# Patient Record
Sex: MALE | Race: WHITE | NOT HISPANIC OR LATINO | Employment: OTHER | URBAN - METROPOLITAN AREA
[De-identification: names, ages, dates, MRNs, and addresses within clinical notes are randomized per-mention and may not be internally consistent; named-entity substitution may affect disease eponyms.]

---

## 2024-04-26 ENCOUNTER — HOSPITAL ENCOUNTER (EMERGENCY)
Facility: HOSPITAL | Age: 39
Discharge: HOME | End: 2024-04-26
Attending: EMERGENCY MEDICINE
Payer: MEDICAID

## 2024-04-26 VITALS
DIASTOLIC BLOOD PRESSURE: 74 MMHG | HEIGHT: 70 IN | TEMPERATURE: 98 F | OXYGEN SATURATION: 97 % | RESPIRATION RATE: 16 BRPM | BODY MASS INDEX: 23.62 KG/M2 | WEIGHT: 165 LBS | SYSTOLIC BLOOD PRESSURE: 117 MMHG | HEART RATE: 86 BPM
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
## 2024-04-26 PROCEDURE — 96365 THER/PROPH/DIAG IV INF INIT: CPT

## 2024-04-26 PROCEDURE — 99284 EMERGENCY DEPT VISIT MOD MDM: CPT | Mod: 25

## 2024-04-26 PROCEDURE — 63600175 PHARM REV CODE 636 W HCPCS: Mod: JZ,TB | Performed by: EMERGENCY MEDICINE

## 2024-04-26 RX ORDER — CLINDAMYCIN HYDROCHLORIDE 300 MG/1
300 CAPSULE ORAL EVERY 6 HOURS
Qty: 28 CAPSULE | Refills: 0 | Status: SHIPPED | OUTPATIENT
Start: 2024-04-26 | End: 2024-05-03

## 2024-04-26 RX ORDER — CLINDAMYCIN PHOSPHATE 600 MG/50ML
600 INJECTION, SOLUTION INTRAVENOUS
Status: COMPLETED | OUTPATIENT
Start: 2024-04-26 | End: 2024-04-26

## 2024-04-26 RX ORDER — MUPIROCIN 20 MG/G
OINTMENT TOPICAL 3 TIMES DAILY
Qty: 22 G | Refills: 0 | Status: SHIPPED | OUTPATIENT
Start: 2024-04-26

## 2024-04-26 RX ADMIN — CLINDAMYCIN PHOSPHATE 600 MG: 600 INJECTION, SOLUTION INTRAVENOUS at 09:04

## 2024-04-26 NOTE — ED PROVIDER NOTES
Encounter Date: 4/26/2024       History     Chief Complaint   Patient presents with    Insect Bite     Pt presents with area of redness and swelling above right eyebrow; onset 3 days ago, unsure of what bit him; pt is a type 1 DM,  currently; also reports hx of MRSA; requesting IV antibiotic therapy as he has had this in the past; denies any fevers      This 38-year-old man presents with an insect bite and early abscess on his right forehead near the temple.  Has a history of MRSA and states that clindamycin is what he tolerates and what works best for him.  He has also a type 1 diabetic.  He is here on a job and does not have Bactroban ointment with him.  He requests that I give him a 1st dose of clindamycin IV.       Review of patient's allergies indicates:   Allergen Reactions    Meropenem Anaphylaxis     Cardiac arrest     Pcn [penicillins] Hives    Topamax [topiramate] Hives     No past medical history on file.  No past surgical history on file.  No family history on file.     Review of Systems   Constitutional:  Negative for fever.   HENT:  Negative for sore throat.    Respiratory:  Negative for shortness of breath.    Cardiovascular:  Negative for chest pain.   Gastrointestinal:  Negative for nausea.   Genitourinary:  Negative for dysuria.   Musculoskeletal:  Negative for back pain.   Skin:  Negative for rash.   Neurological:  Negative for weakness.   Hematological:  Does not bruise/bleed easily.       Physical Exam     Initial Vitals [04/26/24 0950]   BP Pulse Resp Temp SpO2   117/74 86 16 98.1 °F (36.7 °C) 97 %      MAP       --         Physical Exam    Nursing note and vitals reviewed.  Constitutional: He appears well-developed and well-nourished.   HENT:   Head: Normocephalic and atraumatic.   Mouth/Throat: Mucous membranes are normal.   Eyes: EOM are normal. Pupils are equal, round, and reactive to light.   Neck: Neck supple.   Normal range of motion.  Cardiovascular:  Normal rate, regular rhythm,  normal heart sounds and intact distal pulses.           Pulmonary/Chest: Breath sounds normal.   Abdominal: Abdomen is soft. Bowel sounds are normal.   Musculoskeletal:         General: Normal range of motion.      Cervical back: Normal range of motion and neck supple.     Neurological: He is alert and oriented to person, place, and time. He has normal strength.   Skin: Skin is warm and dry. Capillary refill takes less than 2 seconds. Abscess (right temple) noted.   Psychiatric: He has a normal mood and affect. His behavior is normal. Judgment and thought content normal.         ED Course   Procedures  Labs Reviewed - No data to display       Imaging Results    None          Medications   clindamycin in D5W 600 mg/50 mL IVPB 600 mg (0 mg Intravenous Stopped 4/26/24 1026)     Medical Decision Making  Blood sugar per his dexcom is 164.     Risk  Prescription drug management.                                      Clinical Impression:  Final diagnoses:  [W57.XXXA] Bug bite with infection, initial encounter (Primary)          ED Disposition Condition    Discharge Stable          ED Prescriptions       Medication Sig Dispense Start Date End Date Auth. Provider    clindamycin (CLEOCIN) 300 MG capsule Take 1 capsule (300 mg total) by mouth every 6 (six) hours. for 7 days 28 capsule 4/26/2024 5/3/2024 Gerry Boles MD    mupirocin (BACTROBAN) 2 % ointment Apply topically 3 (three) times daily. 22 g 4/26/2024 -- Gerry Boles MD          Follow-up Information       Follow up With Specialties Details Why Contact Info    Follow up with your MD as needed.                 Gerry Boles MD  04/26/24 1022

## 2024-04-28 ENCOUNTER — HOSPITAL ENCOUNTER (EMERGENCY)
Facility: HOSPITAL | Age: 39
Discharge: HOME | End: 2024-04-28
Attending: FAMILY MEDICINE
Payer: MEDICAID

## 2024-04-28 VITALS
RESPIRATION RATE: 18 BRPM | DIASTOLIC BLOOD PRESSURE: 85 MMHG | SYSTOLIC BLOOD PRESSURE: 137 MMHG | BODY MASS INDEX: 23.62 KG/M2 | HEIGHT: 70 IN | TEMPERATURE: 98 F | WEIGHT: 165 LBS | HEART RATE: 69 BPM | OXYGEN SATURATION: 98 %

## 2024-04-28 DIAGNOSIS — L03.213 PRESEPTAL CELLULITIS OF RIGHT UPPER EYELID: Primary | ICD-10-CM

## 2024-04-28 PROCEDURE — 96374 THER/PROPH/DIAG INJ IV PUSH: CPT

## 2024-04-28 PROCEDURE — 63600175 PHARM REV CODE 636 W HCPCS: Mod: JZ,TB | Performed by: FAMILY MEDICINE

## 2024-04-28 PROCEDURE — 99284 EMERGENCY DEPT VISIT MOD MDM: CPT | Mod: 25

## 2024-04-28 RX ORDER — IBUPROFEN 800 MG/1
800 TABLET ORAL
Status: DISCONTINUED | OUTPATIENT
Start: 2024-04-28 | End: 2024-04-28

## 2024-04-28 RX ORDER — ACETAMINOPHEN 500 MG
1000 TABLET ORAL
Status: DISCONTINUED | OUTPATIENT
Start: 2024-04-28 | End: 2024-04-28

## 2024-04-28 RX ORDER — CLINDAMYCIN PHOSPHATE 900 MG/50ML
900 INJECTION, SOLUTION INTRAVENOUS
Status: COMPLETED | OUTPATIENT
Start: 2024-04-28 | End: 2024-04-28

## 2024-04-28 RX ORDER — KETOROLAC TROMETHAMINE 30 MG/ML
30 INJECTION, SOLUTION INTRAMUSCULAR; INTRAVENOUS
Status: DISCONTINUED | OUTPATIENT
Start: 2024-04-28 | End: 2024-04-28 | Stop reason: HOSPADM

## 2024-04-28 RX ADMIN — CLINDAMYCIN IN 5 PERCENT DEXTROSE 900 MG: 18 INJECTION, SOLUTION INTRAVENOUS at 10:04

## 2024-04-28 NOTE — ED NOTES
Pt presents with worsening swelling to right eye; was seen here 2 days ago with an abscess above right eye, given IV Clindamycin and d/c home with Clinda 300mg TID; pt has been taking antibiotics as prescribed but swelling now to periorbital area; denies any visual changes; pt reports fever last night; has hx of multiple abscess requiring IV antibiotics; also DM1; pt has dex-com and blood sugars have stayed above 200 over the last 24hrs, pt has been dosing more; pt reports he has taken tylenol and ibuprofen this morning

## 2024-04-28 NOTE — ED NOTES
MD notified that pt took tylenol and ibuprofen pta, requesting something more for pain ; MD notified

## 2024-04-28 NOTE — ED PROVIDER NOTES
Encounter Date: 4/28/2024       History     Chief Complaint   Patient presents with    Abscess     Pt seen in ER 4/26 for a possible insect bite to R side of forehead -worse today with swelling to R eye      38-year-old presents has a no preseptal cellulitis in the right side was seen here 2 days ago started on clindamycin has only had 2 days with the medicine returns today feels like it has not getting any better he is still open open his eye he has no fevers no chills still some preseptal cellulitis appears where the insect bite he has no palpable abscess I explained to the patient I feel he just needs to give him oral meds more time warm compresses give it a couple of more days at this point with no fevers no chills mild case of some preseptal cellulitis I feel continuing to p.o. antibiotics would be appropriate for a couple of more days patient states he would like to be admitted explained to him at this point I do not feel he meets criteria says he will just go back to his home in [place] see someone over there I will go and give him 1 dose of IV clindamycin per his request prior to discharge recommend Tylenol Motrin for the pain and hot compresses        Review of patient's allergies indicates:   Allergen Reactions    Meropenem Anaphylaxis     Cardiac arrest     Pcn [penicillins] Hives    Topamax [topiramate] Hives     No past medical history on file.  No past surgical history on file.  No family history on file.     Review of Systems   Constitutional:  Negative for fever.   HENT:  Positive for facial swelling. Negative for sore throat.    Respiratory:  Negative for shortness of breath.    Cardiovascular:  Negative for chest pain.   Gastrointestinal:  Negative for nausea.   Genitourinary:  Negative for dysuria.   Musculoskeletal:  Negative for back pain.   Skin:  Negative for rash.   Neurological:  Negative for weakness.   Hematological:  Does not bruise/bleed easily.   All other systems reviewed and are  negative.      Physical Exam     Initial Vitals [04/28/24 0958]   BP Pulse Resp Temp SpO2   122/73 84 18 97.8 °F (36.6 °C) 98 %      MAP       --         Physical Exam    Nursing note and vitals reviewed.  Constitutional: He appears well-developed and well-nourished. He is active.   HENT:   Head: Normocephalic and atraumatic.   Eyes: Conjunctivae, EOM and lids are normal. Pupils are equal, round, and reactive to light.   Preseptal cellulitis right upper eyelid   Neck: Trachea normal and phonation normal. Neck supple. No thyroid mass present.   Normal range of motion.  Cardiovascular:  Normal rate, regular rhythm, normal heart sounds and normal pulses.           Pulmonary/Chest: Breath sounds normal.   Abdominal: Abdomen is soft.   Musculoskeletal:         General: Normal range of motion.      Cervical back: Normal range of motion and neck supple.     Neurological: He is alert and oriented to person, place, and time. He has normal strength and normal reflexes.   Skin: Skin is warm and intact. There is erythema.   Psychiatric: He has a normal mood and affect. His speech is normal and behavior is normal. Judgment and thought content normal. Cognition and memory are normal.         ED Course   Procedures  Labs Reviewed - No data to display       Imaging Results    None          Medications   clindamycin in D5W 900 mg/50 mL IVPB 900 mg (900 mg Intravenous New Bag 4/28/24 1021)   ketorolac injection 30 mg (30 mg Intravenous Not Given 4/28/24 1015)     Medical Decision Making  38-year-old presents has a no preseptal cellulitis in the right side was seen here 2 days ago started on clindamycin has only had 2 days with the medicine returns today feels like it has not getting any better he is still open open his eye he has no fevers no chills still some preseptal cellulitis appears where the insect bite he has no palpable abscess I explained to the patient I feel he just needs to give him oral meds more time warm compresses  give it a couple of more days at this point with no fevers no chills mild case of some preseptal cellulitis I feel continuing to p.o. antibiotics would be appropriate for a couple of more days patient states he would like to be admitted explained to him at this point I do not feel he meets criteria says he will just go back to his home in [place] see someone over there I will go and give him 1 dose of IV clindamycin per his request prior to discharge recommend Tylenol Motrin for the pain and hot compresses          Risk  Prescription drug management.                                      Clinical Impression:  Final diagnoses:  [L03.213] Preseptal cellulitis of right upper eyelid (Primary)          ED Disposition Condition    Discharge Stable          ED Prescriptions    None       Follow-up Information       Follow up With Specialties Details Why Contact Info    [facility] - Emergency Dept Emergency Medicine  As needed [address] [facility] [address]  [phone]             Kilo Tran MD  04/28/24 5440

## 2024-04-30 ENCOUNTER — HOSPITAL ENCOUNTER (EMERGENCY)
Facility: HOSPITAL | Age: 39
Discharge: LEFT BEFORE DISCHARGE | End: 2024-04-30
Attending: FAMILY MEDICINE
Payer: MEDICAID

## 2024-04-30 VITALS
SYSTOLIC BLOOD PRESSURE: 131 MMHG | BODY MASS INDEX: 25.11 KG/M2 | RESPIRATION RATE: 20 BRPM | DIASTOLIC BLOOD PRESSURE: 76 MMHG | OXYGEN SATURATION: 98 % | TEMPERATURE: 98 F | HEART RATE: 91 BPM | WEIGHT: 175 LBS

## 2024-04-30 DIAGNOSIS — L03.211 FACIAL CELLULITIS: Primary | ICD-10-CM

## 2024-04-30 DIAGNOSIS — L02.01 FACIAL ABSCESS: ICD-10-CM

## 2024-04-30 PROCEDURE — 99281 EMR DPT VST MAYX REQ PHY/QHP: CPT

## 2024-04-30 RX ORDER — LIDOCAINE 40 MG/G
CREAM TOPICAL
Status: DISCONTINUED | OUTPATIENT
Start: 2024-04-30 | End: 2024-04-30 | Stop reason: HOSPADM

## 2024-04-30 RX ORDER — CLINDAMYCIN PHOSPHATE 900 MG/50ML
900 INJECTION, SOLUTION INTRAVENOUS
Status: DISCONTINUED | OUTPATIENT
Start: 2024-04-30 | End: 2024-04-30 | Stop reason: HOSPADM

## 2024-04-30 NOTE — ED PROVIDER NOTES
Encounter Date: 4/30/2024       History     Chief Complaint   Patient presents with    Facial Swelling     Reports was bit by insect on rt side of forehead a few days ago - seen here/treated - on antibiotics- not improving- swelling is worse- alisson orbital edema to rt eye- no drainage per pt     38-year-old presents with some swelling around the right eye area was seen a couple of days ago with her preseptal cellulitis on oral antibiotics has gotten worse instead of better not responding to the oral antibiotics I explained to the patient most likely will have to be admitted for fell of outpatient oral antibiotics we will get some lab work blood cultures put some numbing medicines trying to do drainage get some cultures when everything is back we will consult internal medicine for admission for IV antibiotics patient appears to be very angry and upset        Review of patient's allergies indicates:   Allergen Reactions    Meropenem Anaphylaxis     Cardiac arrest     Pcn [penicillins] Hives    Topamax [topiramate] Hives     No past medical history on file.  No past surgical history on file.  No family history on file.     Review of Systems   HENT:  Positive for facial swelling.    All other systems reviewed and are negative.      Physical Exam     Initial Vitals [04/30/24 1201]   BP Pulse Resp Temp SpO2   131/76 91 20 98.3 °F (36.8 °C) 98 %      MAP       --         Physical Exam    Nursing note and vitals reviewed.  Constitutional: He appears well-developed and well-nourished. He is active.   HENT:   Head: Normocephalic and atraumatic.   Eyes: Conjunctivae, EOM and lids are normal. Pupils are equal, round, and reactive to light.   Neck: Trachea normal and phonation normal. Neck supple. No thyroid mass present.   Normal range of motion.  Cardiovascular:  Normal rate, regular rhythm, normal heart sounds, intact distal pulses and normal pulses.           Pulmonary/Chest: Breath sounds normal.   Abdominal: Abdomen is  soft. Bowel sounds are normal.   Musculoskeletal:         General: Normal range of motion.      Cervical back: Normal range of motion and neck supple.     Neurological: He is alert and oriented to person, place, and time. He has normal strength and normal reflexes.   Skin: Skin is warm and intact. There is erythema.   Swelling of the right upper and lower lid appears to be a small abscess above the right eye versus cellulitis   Psychiatric: He has a normal mood and affect. His speech is normal and behavior is normal. Judgment and thought content normal. Cognition and memory are normal.         ED Course   Procedures  Labs Reviewed   BLOOD CULTURE [facility]   BLOOD CULTURE [facility]   BASIC METABOLIC PANEL   CBC W/ AUTO DIFFERENTIAL    Narrative:     The following orders were created for panel order CBC Auto Differential.  Procedure                               Abnormality         Status                     ---------                               -----------         ------                     CBC with Differential[[id number]]                                                        Please view results for these tests on the individual orders.   HIGH SENSITIVITY CRP   CBC WITH DIFFERENTIAL          Imaging Results    None          Medications   clindamycin in D5W 900 mg/50 mL IVPB 900 mg (has no administration in time range)   vancomycin (VANCOCIN) 1,000 mg in dextrose 5 % (D5W) 250 mL IVPB (Vial-Mate) (has no administration in time range)   LIDOcaine 4 % cream (has no administration in time range)     Medical Decision Making  38-year-old presents with some swelling around the right eye area was seen a couple of days ago with her preseptal cellulitis on oral antibiotics has gotten worse instead of better not responding to the oral antibiotics I explained to the patient most likely will have to be admitted for fell of outpatient oral antibiotics we will get some lab work blood cultures put some numbing medicines trying to do  drainage get some cultures when everything is back we will consult internal medicine for admission for IV antibiotics patient appears to be very angry and upset      Patient asked for some for pain told him we would start with some Tylenol and if that did not work give him some meds patient became upset because he wanted to give him Tylenol use some curse words and then eloped said he will find another hospital    Amount and/or Complexity of Data Reviewed  Labs: ordered.    Risk  OTC drugs.  Prescription drug management.  Risk Details: Differential diagnosis preseptal cellulitis orbital cellulitis facial cellulitis facial abscess                                      Clinical Impression:  Final diagnoses:  [L03.211] Facial cellulitis (Primary)  [L02.01] Facial abscess          ED Disposition Condition    Eloped Stable                Kilo Tran MD  04/30/24 3871

## 2024-04-30 NOTE — ED NOTES
Entered pt room, phlebotomist reported pt told her he was going to another hospital and walked out of the room.

## 2024-05-06 DIAGNOSIS — L02.01 FACIAL ABSCESS: Primary | ICD-10-CM

## 2024-07-01 ENCOUNTER — CLINICAL SUPPORT (OUTPATIENT)
Dept: FAMILY MEDICINE | Facility: CLINIC | Age: 39
End: 2024-07-01
Attending: NURSE PRACTITIONER
Payer: MEDICAID

## 2024-07-01 ENCOUNTER — OFFICE VISIT (OUTPATIENT)
Dept: FAMILY MEDICINE | Facility: CLINIC | Age: 39
End: 2024-07-01
Payer: MEDICAID

## 2024-07-01 ENCOUNTER — TELEPHONE (OUTPATIENT)
Dept: FAMILY MEDICINE | Facility: CLINIC | Age: 39
End: 2024-07-01

## 2024-07-01 VITALS
TEMPERATURE: 98 F | HEART RATE: 63 BPM | RESPIRATION RATE: 18 BRPM | DIASTOLIC BLOOD PRESSURE: 71 MMHG | OXYGEN SATURATION: 99 % | HEIGHT: 70 IN | WEIGHT: 163 LBS | SYSTOLIC BLOOD PRESSURE: 105 MMHG | BODY MASS INDEX: 23.34 KG/M2

## 2024-07-01 DIAGNOSIS — L98.9 LESION OF SKIN OF SCALP: ICD-10-CM

## 2024-07-01 DIAGNOSIS — Z00.00 ENCOUNTER FOR WELLNESS EXAMINATION: ICD-10-CM

## 2024-07-01 DIAGNOSIS — F90.9 ATTENTION DEFICIT HYPERACTIVITY DISORDER (ADHD), UNSPECIFIED ADHD TYPE: ICD-10-CM

## 2024-07-01 DIAGNOSIS — E10.65 TYPE 1 DIABETES MELLITUS WITH HYPERGLYCEMIA: Primary | ICD-10-CM

## 2024-07-01 DIAGNOSIS — I51.9 HEART DISEASE: ICD-10-CM

## 2024-07-01 DIAGNOSIS — E10.65 TYPE 1 DIABETES MELLITUS WITH HYPERGLYCEMIA: ICD-10-CM

## 2024-07-01 DIAGNOSIS — F32.A DEPRESSION, UNSPECIFIED DEPRESSION TYPE: ICD-10-CM

## 2024-07-01 DIAGNOSIS — F41.9 ANXIETY: ICD-10-CM

## 2024-07-01 PROBLEM — E87.5 HYPERKALEMIA: Status: ACTIVE | Noted: 2022-12-02

## 2024-07-01 PROBLEM — M10.9 GOUT: Status: ACTIVE | Noted: 2022-06-02

## 2024-07-01 PROBLEM — K31.84 DIABETIC GASTROPARESIS: Status: ACTIVE | Noted: 2023-01-16

## 2024-07-01 PROBLEM — G62.9 NEUROPATHY: Status: ACTIVE | Noted: 2022-06-13

## 2024-07-01 PROBLEM — E10.42 TYPE 1 DIABETES MELLITUS WITH DIABETIC POLYNEUROPATHY: Status: ACTIVE | Noted: 2022-05-23

## 2024-07-01 PROBLEM — E11.43 DIABETIC GASTROPARESIS: Status: ACTIVE | Noted: 2023-01-16

## 2024-07-01 LAB
25(OH)D3+25(OH)D2 SERPL-MCNC: 26 NG/ML (ref 30–80)
ALBUMIN SERPL-MCNC: 3.1 G/DL (ref 3.5–5)
ALBUMIN/GLOB SERPL: 0.7 RATIO (ref 1.1–2)
ALP SERPL-CCNC: 73 UNIT/L (ref 40–150)
ALT SERPL-CCNC: 13 UNIT/L (ref 0–55)
ANION GAP SERPL CALC-SCNC: 5 MEQ/L
AST SERPL-CCNC: 21 UNIT/L (ref 5–34)
BACTERIA #/AREA URNS AUTO: ABNORMAL /HPF
BASOPHILS # BLD AUTO: 0.09 X10(3)/MCL
BASOPHILS NFR BLD AUTO: 1 %
BILIRUB SERPL-MCNC: 0.3 MG/DL
BILIRUB UR QL STRIP.AUTO: NEGATIVE
BUN SERPL-MCNC: 31.1 MG/DL (ref 8.9–20.6)
CALCIUM SERPL-MCNC: 9.3 MG/DL (ref 8.4–10.2)
CASTS URNS MICRO: ABNORMAL /LPF
CHLORIDE SERPL-SCNC: 104 MMOL/L (ref 98–107)
CHOLEST SERPL-MCNC: 183 MG/DL
CHOLEST/HDLC SERPL: 4 {RATIO} (ref 0–5)
CLARITY UR: CLEAR
CO2 SERPL-SCNC: 31 MMOL/L (ref 22–29)
COLOR UR AUTO: YELLOW
CREAT SERPL-MCNC: 1.13 MG/DL (ref 0.73–1.18)
CREAT UR-MCNC: 230 MG/DL (ref 63–166)
CREAT/UREA NIT SERPL: 28
EOSINOPHIL # BLD AUTO: 0.35 X10(3)/MCL (ref 0–0.9)
EOSINOPHIL NFR BLD AUTO: 3.7 %
ERYTHROCYTE [DISTWIDTH] IN BLOOD BY AUTOMATED COUNT: 12.9 % (ref 11.5–17)
EST. AVERAGE GLUCOSE BLD GHB EST-MCNC: 217.3 MG/DL
GFR SERPLBLD CREATININE-BSD FMLA CKD-EPI: >60 ML/MIN/1.73/M2
GLOBULIN SER-MCNC: 4.2 GM/DL (ref 2.4–3.5)
GLUCOSE SERPL-MCNC: 154 MG/DL (ref 74–100)
GLUCOSE UR QL STRIP: ABNORMAL
HAV IGM SERPL QL IA: NONREACTIVE
HBA1C MFR BLD: 9.2 %
HBV CORE IGM SERPL QL IA: NONREACTIVE
HBV SURFACE AG SERPL QL IA: NONREACTIVE
HCT VFR BLD AUTO: 34.3 % (ref 42–52)
HCV AB SERPL QL IA: NONREACTIVE
HDLC SERPL-MCNC: 49 MG/DL (ref 35–60)
HGB BLD-MCNC: 11.5 G/DL (ref 14–18)
HGB UR QL STRIP: ABNORMAL
HIV 1+2 AB+HIV1 P24 AG SERPL QL IA: NONREACTIVE
HYALINE CASTS #/AREA URNS LPF: ABNORMAL /LPF
IMM GRANULOCYTES # BLD AUTO: 0.03 X10(3)/MCL (ref 0–0.04)
IMM GRANULOCYTES NFR BLD AUTO: 0.3 %
KETONES UR QL STRIP: NEGATIVE
LDLC SERPL CALC-MCNC: 111 MG/DL (ref 50–140)
LEUKOCYTE ESTERASE UR QL STRIP: NEGATIVE
LYMPHOCYTES # BLD AUTO: 2.17 X10(3)/MCL (ref 0.6–4.6)
LYMPHOCYTES NFR BLD AUTO: 23 %
MCH RBC QN AUTO: 28.8 PG (ref 27–31)
MCHC RBC AUTO-ENTMCNC: 33.5 G/DL (ref 33–36)
MCV RBC AUTO: 85.8 FL (ref 80–94)
MICROALBUMIN UR-MCNC: 987.4 UG/ML
MICROALBUMIN/CREAT RATIO PNL UR: 429.3 MG/GM CR (ref 0–30)
MONOCYTES # BLD AUTO: 0.59 X10(3)/MCL (ref 0.1–1.3)
MONOCYTES NFR BLD AUTO: 6.3 %
MUCOUS THREADS URNS QL MICRO: ABNORMAL /LPF
NEUTROPHILS # BLD AUTO: 6.21 X10(3)/MCL (ref 2.1–9.2)
NEUTROPHILS NFR BLD AUTO: 65.7 %
NITRITE UR QL STRIP: NEGATIVE
NRBC BLD AUTO-RTO: 0 %
PH UR STRIP: 6 [PH]
PLATELET # BLD AUTO: 329 X10(3)/MCL (ref 130–400)
PMV BLD AUTO: 11 FL (ref 7.4–10.4)
POTASSIUM SERPL-SCNC: 4.9 MMOL/L (ref 3.5–5.1)
PROT SERPL-MCNC: 7.3 GM/DL (ref 6.4–8.3)
PROT UR QL STRIP: ABNORMAL
RBC # BLD AUTO: 4 X10(6)/MCL (ref 4.7–6.1)
RBC #/AREA URNS AUTO: ABNORMAL /HPF
SODIUM SERPL-SCNC: 140 MMOL/L (ref 136–145)
SP GR UR STRIP.AUTO: 1.02 (ref 1–1.03)
SPERM URNS QL MICRO: ABNORMAL /HPF
SQUAMOUS #/AREA URNS LPF: ABNORMAL /HPF
T PALLIDUM AB SER QL: NONREACTIVE
T4 FREE SERPL-MCNC: 1.13 NG/DL (ref 0.7–1.48)
TRIGL SERPL-MCNC: 115 MG/DL (ref 34–140)
TSH SERPL-ACNC: 1.67 UIU/ML (ref 0.35–4.94)
UROBILINOGEN UR STRIP-ACNC: NORMAL
VIT B12 SERPL-MCNC: 499 PG/ML (ref 213–816)
VLDLC SERPL CALC-MCNC: 23 MG/DL
WBC # BLD AUTO: 9.44 X10(3)/MCL (ref 4.5–11.5)
WBC #/AREA URNS AUTO: ABNORMAL /HPF

## 2024-07-01 PROCEDURE — 82306 VITAMIN D 25 HYDROXY: CPT | Performed by: NURSE PRACTITIONER

## 2024-07-01 PROCEDURE — 3078F DIAST BP <80 MM HG: CPT | Mod: CPTII,,, | Performed by: NURSE PRACTITIONER

## 2024-07-01 PROCEDURE — 92228 IMG RTA DETC/MNTR DS PHY/QHP: CPT | Mod: 26,S$PBB,, | Performed by: OPTOMETRIST

## 2024-07-01 PROCEDURE — 1159F MED LIST DOCD IN RCRD: CPT | Mod: CPTII,,, | Performed by: NURSE PRACTITIONER

## 2024-07-01 PROCEDURE — 80061 LIPID PANEL: CPT | Performed by: NURSE PRACTITIONER

## 2024-07-01 PROCEDURE — 84443 ASSAY THYROID STIM HORMONE: CPT | Performed by: NURSE PRACTITIONER

## 2024-07-01 PROCEDURE — 3052F HG A1C>EQUAL 8.0%<EQUAL 9.0%: CPT | Mod: CPTII,,, | Performed by: NURSE PRACTITIONER

## 2024-07-01 PROCEDURE — 82043 UR ALBUMIN QUANTITATIVE: CPT | Performed by: NURSE PRACTITIONER

## 2024-07-01 PROCEDURE — 1160F RVW MEDS BY RX/DR IN RCRD: CPT | Mod: CPTII,,, | Performed by: NURSE PRACTITIONER

## 2024-07-01 PROCEDURE — 99215 OFFICE O/P EST HI 40 MIN: CPT | Mod: PBBFAC,PN | Performed by: NURSE PRACTITIONER

## 2024-07-01 PROCEDURE — 83036 HEMOGLOBIN GLYCOSYLATED A1C: CPT | Performed by: NURSE PRACTITIONER

## 2024-07-01 PROCEDURE — 82570 ASSAY OF URINE CREATININE: CPT | Performed by: NURSE PRACTITIONER

## 2024-07-01 PROCEDURE — 86780 TREPONEMA PALLIDUM: CPT | Performed by: NURSE PRACTITIONER

## 2024-07-01 PROCEDURE — 82607 VITAMIN B-12: CPT | Performed by: NURSE PRACTITIONER

## 2024-07-01 PROCEDURE — 3008F BODY MASS INDEX DOCD: CPT | Mod: CPTII,,, | Performed by: NURSE PRACTITIONER

## 2024-07-01 PROCEDURE — 87389 HIV-1 AG W/HIV-1&-2 AB AG IA: CPT | Performed by: NURSE PRACTITIONER

## 2024-07-01 PROCEDURE — 99214 OFFICE O/P EST MOD 30 MIN: CPT | Mod: 25,S$PBB,, | Performed by: NURSE PRACTITIONER

## 2024-07-01 PROCEDURE — 84439 ASSAY OF FREE THYROXINE: CPT | Performed by: NURSE PRACTITIONER

## 2024-07-01 PROCEDURE — 36415 COLL VENOUS BLD VENIPUNCTURE: CPT | Performed by: NURSE PRACTITIONER

## 2024-07-01 PROCEDURE — 80053 COMPREHEN METABOLIC PANEL: CPT | Performed by: NURSE PRACTITIONER

## 2024-07-01 PROCEDURE — 92228 IMG RTA DETC/MNTR DS PHY/QHP: CPT | Mod: TC,PBBFAC,PN | Performed by: STUDENT IN AN ORGANIZED HEALTH CARE EDUCATION/TRAINING PROGRAM

## 2024-07-01 PROCEDURE — 80074 ACUTE HEPATITIS PANEL: CPT | Performed by: NURSE PRACTITIONER

## 2024-07-01 PROCEDURE — 2024F 7 FLD RTA PHOTO EVC RTNOPTHY: CPT | Mod: CPTII,,, | Performed by: OPTOMETRIST

## 2024-07-01 PROCEDURE — 92228 IMG RTA DETC/MNTR DS PHY/QHP: CPT | Mod: PBBFAC,PN

## 2024-07-01 PROCEDURE — 85025 COMPLETE CBC W/AUTO DIFF WBC: CPT | Performed by: NURSE PRACTITIONER

## 2024-07-01 PROCEDURE — 3074F SYST BP LT 130 MM HG: CPT | Mod: CPTII,,, | Performed by: NURSE PRACTITIONER

## 2024-07-01 PROCEDURE — 99385 PREV VISIT NEW AGE 18-39: CPT | Mod: S$PBB,,, | Performed by: NURSE PRACTITIONER

## 2024-07-01 PROCEDURE — 81001 URINALYSIS AUTO W/SCOPE: CPT | Performed by: NURSE PRACTITIONER

## 2024-07-01 RX ORDER — METOCLOPRAMIDE 5 MG/1
5 TABLET ORAL 4 TIMES DAILY
Qty: 120 TABLET | Refills: 3 | Status: SHIPPED | OUTPATIENT
Start: 2024-07-01

## 2024-07-01 RX ORDER — DEXTROAMPHETAMINE SACCHARATE, AMPHETAMINE ASPARTATE MONOHYDRATE, DEXTROAMPHETAMINE SULFATE AND AMPHETAMINE SULFATE 5; 5; 5; 5 MG/1; MG/1; MG/1; MG/1
20 CAPSULE, EXTENDED RELEASE ORAL 2 TIMES DAILY
COMMUNITY
End: 2024-07-01 | Stop reason: SDUPTHER

## 2024-07-01 RX ORDER — PAROXETINE HYDROCHLORIDE 20 MG/1
20 TABLET, FILM COATED ORAL EVERY MORNING
Qty: 30 TABLET | Refills: 3 | Status: SHIPPED | OUTPATIENT
Start: 2024-07-01

## 2024-07-01 RX ORDER — PAROXETINE HYDROCHLORIDE 20 MG/1
20 TABLET, FILM COATED ORAL EVERY MORNING
COMMUNITY
End: 2024-07-01 | Stop reason: SDUPTHER

## 2024-07-01 RX ORDER — METOCLOPRAMIDE 5 MG/1
5 TABLET ORAL 4 TIMES DAILY
COMMUNITY
End: 2024-07-01 | Stop reason: SDUPTHER

## 2024-07-01 RX ORDER — BLOOD-GLUCOSE SENSOR
1 EACH MISCELLANEOUS DAILY
Qty: 5 EACH | Refills: 11 | Status: SHIPPED | OUTPATIENT
Start: 2024-07-01 | End: 2025-07-01

## 2024-07-01 RX ORDER — DEXTROAMPHETAMINE SACCHARATE, AMPHETAMINE ASPARTATE MONOHYDRATE, DEXTROAMPHETAMINE SULFATE AND AMPHETAMINE SULFATE 5; 5; 5; 5 MG/1; MG/1; MG/1; MG/1
20 CAPSULE, EXTENDED RELEASE ORAL 2 TIMES DAILY
Qty: 60 CAPSULE | Refills: 0 | Status: SHIPPED | OUTPATIENT
Start: 2024-07-01

## 2024-07-01 NOTE — TELEPHONE ENCOUNTER
----- Message from Mackenzie Lu sent at 7/1/2024  9:25 AM CDT -----  Regarding: Pharmacy call  EXTERNAL MESSAGE REGARDING PATIENT    Call urgency: urgent    Incoming call from: pharmacy- Jose     Return phone number: [phone]     Patient name: Anthony    Message: Pharmacy needs clarification on pt rx of   nsulin lispro protamin-lispro (HUMALOG 50/50) 100 unit/mL (50-50) Susp    Mackenzie Lu  7/1/2024, 9:25 AM

## 2024-07-01 NOTE — PROGRESS NOTES
Patient Name: Anthony Hadley     : 1985    MRN: [id number]     Subjective:     Patient ID: Anthony Hadley is a 38 y.o. male.    Chief Complaint:   Chief Complaint   Patient presents with    Establish Care     Pt here to establish care with PCP. Pt recently moved from [facility]. Pt reports h/o type 1 DM, anxiety, depression, ADHD, and heart dz. Pt request  referral        HPI: 24: Establish care with PCP. Hx Type 1 DM on insulin pump, heart disease, ADHD, Anxiety/Depression.     Pt is moving here from [place].  He states his MD PCP there was managing all of his conditions.     Type 1 DM-  He has been Type 1 diabetic since . He is on an insulin pump.  He was told that a referral would be placed to Endocrinology here at [facility] for management of his disease process. He refused to see Endocrine citing that they all treat him like crap.  He is amenable to seeing wound care for diabetic foot care and also wants dermatology referral for scalp lesions that he has tried several OTC preparations for and nothing is working.  Pictures in Epic.      Heart disease-  He has hx heart disease but states was never told what type of heart disease.  Records from  indicate a ALLAN was done and showed the following:    ALLAN 22  Technically difficult study   Normal thickness left ventricle with an ejection fraction estimated normal   around 55%   Mild mitral regurgitation   Mild tricuspid regurgitation   Pulmonary pressures mildly elevated RVSP estimated 34 mmHg   No pericardial effusion       ADHD/Anxiety/Depression-  Pt requesting referral to .  He is dyslexic as well.          ROS:      Review of Systems   Skin:         Hyperpigmented skin lesions bilateral lower legs.    Erythematous nodule to scalp, pictures taken.     Toenails discolored, yellowing bilaterally with callus noted to both feet.   All other systems reviewed and are negative.           History:     Past Medical History:   Diagnosis Date    Anxiety      "Depression     Diabetes mellitus type I         Past Surgical History:   Procedure Laterality Date    APPENDECTOMY      CHOLECYSTECTOMY      FOOT SURGERY      bone spure and fracture       Family History   Problem Relation Name Age of Onset    Heart attack Mother      Heart disease Father      Diabetes Father      Crohn's disease Father          Social History     Tobacco Use    Smoking status: Never    Smokeless tobacco: Never   Substance and Sexual Activity    Alcohol use: Yes     Comment: socially    Drug use: Yes     Types: Marijuana    Sexual activity: Yes       Current Outpatient Medications   Medication Instructions    blood-glucose sensor (DEXCOM G7 SENSOR) Haley 1 Device, Misc.(Non-Drug; Combo Route), Daily    dextroamphetamine-amphetamine (ADDERALL XR) 20 MG 24 hr capsule 20 mg, Oral, 2 times daily, Once in the morning and once in the afternoon    insulin lispro protamin-lispro (HUMALOG 50/50) 100 unit/mL (50-50) Susp 100 Units, Subcutaneous, Daily    metoclopramide HCl (REGLAN) 5 mg, Oral, 4 times daily    mupirocin (BACTROBAN) 2 % ointment Topical (Top), 3 times daily    paroxetine (PAXIL) 20 mg, Oral, Every morning        Review of patient's allergies indicates:   Allergen Reactions    Meropenem Anaphylaxis     Cardiac arrest     Pcn [penicillins] Hives    Topamax [topiramate] Hives       Objective:     Visit Vitals  /71 (BP Location: Left arm, Patient Position: Sitting, BP Method: Large (Automatic))   Pulse 63   Temp 97.9 °F (36.6 °C) (Oral)   Resp 18   Ht 5' 10" (1.778 m)   Wt 73.9 kg (163 lb)   SpO2 99%   BMI 23.39 kg/m²       Physical Examination:     Physical Exam  Vitals and nursing note reviewed.   HENT:      Head: Normocephalic.      Right Ear: Tympanic membrane normal.      Left Ear: Tympanic membrane normal.      Nose: Nose normal.      Mouth/Throat:      Mouth: Mucous membranes are moist.      Pharynx: Oropharynx is clear.   Eyes:      Extraocular Movements: Extraocular movements intact. " "     Conjunctiva/sclera: Conjunctivae normal.      Pupils: Pupils are equal, round, and reactive to light.   Cardiovascular:      Rate and Rhythm: Normal rate and regular rhythm.      Pulses: Normal pulses.      Heart sounds: Normal heart sounds.   Pulmonary:      Effort: Pulmonary effort is normal.      Breath sounds: Normal breath sounds.   Abdominal:      General: Abdomen is flat. Bowel sounds are normal.      Palpations: Abdomen is soft.   Musculoskeletal:         General: Normal range of motion.      Cervical back: Normal range of motion and neck supple.   Neurological:      General: No focal deficit present.      Mental Status: He is alert and oriented to person, place, and time. Mental status is at baseline.   Psychiatric:         Mood and Affect: Mood normal.         Behavior: Behavior normal.         Thought Content: Thought content normal.         Judgment: Judgment normal.         Lab Results:     Chemistry:  Lab Results   Component Value Date     06/09/2024    K 4.3 06/09/2024    BUN 28 (H) 06/09/2024    CREATININE 0.97 06/09/2024    CALCIUM 8.7 (L) 06/09/2024    LABPROT 12.6 01/17/2023    ALBUMIN 3.0 (L) 02/05/2024        Lab Results   Component Value Date    HGBA1C 8.5 (H) 04/01/2024        Hematology:  No results found for: "WBC", "HGB", "HCT", "PLT"    Lipid Panel:  Lab Results   Component Value Date    CHOL 157 01/17/2023    HDL 46 01/17/2023    TRIG 120 01/17/2023        Urine:  Lab Results   Component Value Date    PROTEINURINE 61.5 02/04/2024        Assessment:          ICD-10-CM ICD-9-CM   1. Type 1 diabetes mellitus with hyperglycemia  E10.65 250.01   2. Anxiety  F41.9 300.00   3. Depression, unspecified depression type  F32.A 311   4. Attention deficit hyperactivity disorder (ADHD), unspecified ADHD type  F90.9 314.01   5. Heart disease  I51.9 429.9   6. Encounter for wellness examination  Z00.00 V70.0   7. Lesion of skin of scalp  L98.9 709.9        Plan:     1. Type 1 diabetes mellitus " with hyperglycemia  -     Ambulatory referral/consult to Endocrinology; Future; Expected date: 07/08/2024  -     Microalbumin/creatinine urine ratio  -     Diabetic Eye Screening Photo; Future  -     Ambulatory referral/consult to Wound Clinic; Future; Expected date: 07/08/2024  -     insulin lispro protamin-lispro (HUMALOG 50/50) 100 unit/mL (50-50) Susp; Inject 100 Units into the skin once daily.  Dispense: 10 mL; Refill: 3  -     blood-glucose sensor (DEXCOM G7 SENSOR) Haley; 1 Device by Misc.(Non-Drug; Combo Route) route once daily.  Dispense: 5 each; Refill: 11    2. Anxiety  -     Ambulatory referral/consult to Behavioral Health; Future; Expected date: 07/08/2024  -     paroxetine (PAXIL) 20 MG tablet; Take 1 tablet (20 mg total) by mouth every morning.  Dispense: 30 tablet; Refill: 3    3. Depression, unspecified depression type  -     Ambulatory referral/consult to Behavioral Health; Future; Expected date: 07/08/2024  -     paroxetine (PAXIL) 20 MG tablet; Take 1 tablet (20 mg total) by mouth every morning.  Dispense: 30 tablet; Refill: 3    4. Attention deficit hyperactivity disorder (ADHD), unspecified ADHD type  -     Ambulatory referral/consult to Behavioral Health; Future; Expected date: 07/08/2024  -     dextroamphetamine-amphetamine (ADDERALL XR) 20 MG 24 hr capsule; Take 1 capsule (20 mg total) by mouth 2 (two) times a day. Once in the morning and once in the afternoon  Dispense: 60 capsule; Refill: 0    5. Heart disease  -     Ambulatory referral/consult to Cardiology; Future; Expected date: 07/08/2024    6. Encounter for wellness examination  -     CBC Auto Differential  -     Comprehensive Metabolic Panel  -     Urinalysis  -     Hemoglobin A1C  -     TSH  -     T4, Free  -     Vitamin D  -     Vitamin B12  -     Hepatitis Panel, Acute  -     HIV 1/2 Ag/Ab (4th Gen)  -     SYPHILIS ANTIBODY (WITH REFLEX RPR)  -     Lipid Panel    7. Lesion of skin of scalp  -     Ambulatory referral/consult to  Dermatology; Future; Expected date: 07/08/2024    Other orders  -     metoclopramide HCl (REGLAN) 5 MG tablet; Take 1 tablet (5 mg total) by mouth 4 (four) times daily.  Dispense: 120 tablet; Refill: 3         Follow up in about 2 weeks (around 7/15/2024).    Future Appointments   Date Time Provider Department Center   7/16/2024  9:45 AM Nasrin Arambula FNP [facility]        [place]

## 2024-07-02 ENCOUNTER — TELEPHONE (OUTPATIENT)
Dept: FAMILY MEDICINE | Facility: CLINIC | Age: 39
End: 2024-07-02
Payer: MEDICAID

## 2024-07-02 NOTE — TELEPHONE ENCOUNTER
----- Message from Mackenzie Lu sent at 7/2/2024 11:28 AM CDT -----  Regarding: Pharmacy call  PERSON/FACILITY WITH CONCERN:   [id number] - [place] - [address] [facility] [address]    REGARDING PATIENT: Anthony Hadley     MESSAGE: t pharmacy requesting a return call regarding rx of dexom g7 sensor     RETURN PHONE NUMBER :[phone]

## 2024-07-08 ENCOUNTER — TELEPHONE (OUTPATIENT)
Dept: FAMILY MEDICINE | Facility: CLINIC | Age: 39
End: 2024-07-08
Payer: MEDICAID

## 2024-07-08 DIAGNOSIS — F90.9 ATTENTION DEFICIT HYPERACTIVITY DISORDER (ADHD), UNSPECIFIED ADHD TYPE: Primary | ICD-10-CM

## 2024-07-08 RX ORDER — DEXTROAMPHETAMINE SACCHARATE, AMPHETAMINE ASPARTATE, DEXTROAMPHETAMINE SULFATE AND AMPHETAMINE SULFATE 5; 5; 5; 5 MG/1; MG/1; MG/1; MG/1
1 TABLET ORAL 2 TIMES DAILY
Qty: 60 TABLET | Refills: 0 | Status: SHIPPED | OUTPATIENT
Start: 2024-07-08

## 2024-07-08 NOTE — TELEPHONE ENCOUNTER
----- Message from Sammi Sim sent at 7/5/2024 12:13 PM CDT -----  Regarding: med inquiry  Pt medicine is for extended release and his ins doesn't cover that so he wants the regular kind not the ext release     dextroamphetamine-amphetamine (ADDERALL XR) 20 MG 24 hr capsule

## 2024-07-08 NOTE — PROGRESS NOTES
I have sent medications and/or lab orders in for this patient.  Please notify the patient.     No orders of the defined types were placed in this encounter.      Medications Ordered This Encounter   Medications    dextroamphetamine-amphetamine (ADDERALL) 20 mg tablet     Sig: Take 1 tablet by mouth 2 (two) times a day.     Dispense:  60 tablet     Refill:  0

## 2024-07-09 NOTE — PROGRESS NOTES
Anthony Hadley is a 38 y.o. male here for a diabetic eye screening with non-dilated fundus photos per PUNEET Lofton.    Patient cooperative?: Yes  Small pupils?: No  Last eye exam:       For exam results, see Encounter Report.

## 2024-07-10 DIAGNOSIS — E10.65 TYPE 1 DIABETES MELLITUS WITH HYPERGLYCEMIA: Primary | ICD-10-CM

## 2024-07-10 NOTE — PROGRESS NOTES
I have sent medications and/or lab orders in for this patient.  Please notify the patient.     Orders Placed This Encounter   Procedures    Ambulatory referral/consult to Ophthalmology     Standing Status:   Future     Standing Expiration Date:   8/10/2025     Referral Priority:   Urgent     Referral Type:   Consultation     Referral Reason:   Specialty Services Required     Requested Specialty:   Ophthalmology     Number of Visits Requested:   1

## 2024-07-12 ENCOUNTER — TELEPHONE (OUTPATIENT)
Dept: FAMILY MEDICINE | Facility: CLINIC | Age: 39
End: 2024-07-12
Payer: MEDICAID

## 2024-07-12 DIAGNOSIS — E10.65 TYPE 1 DIABETES MELLITUS WITH HYPERGLYCEMIA: ICD-10-CM

## 2024-07-12 NOTE — TELEPHONE ENCOUNTER
----- Message from Mackenzie Lu sent at 7/12/2024 10:44 AM CDT -----  Regarding: Insulin  MESSAGE FROM PATIENT:    Call urgency: urgent    Patient name: Anthony Kate phone number for patient: [phone]    Thorough description of pt Concern: Pt called stating the insulin that was called in for him is discontinued, he would like a return call from the nurse    Is this a new concern within 10 days of last visit? no  (If yes, pt needs to schedule a visit with provider)    Mackenzie Lu  7/12/2024, 10:45 AM

## 2024-07-16 ENCOUNTER — OFFICE VISIT (OUTPATIENT)
Dept: FAMILY MEDICINE | Facility: CLINIC | Age: 39
End: 2024-07-16
Payer: MEDICAID

## 2024-07-16 VITALS
TEMPERATURE: 98 F | DIASTOLIC BLOOD PRESSURE: 74 MMHG | OXYGEN SATURATION: 99 % | RESPIRATION RATE: 18 BRPM | WEIGHT: 163 LBS | HEART RATE: 90 BPM | BODY MASS INDEX: 23.34 KG/M2 | SYSTOLIC BLOOD PRESSURE: 109 MMHG | HEIGHT: 70 IN

## 2024-07-16 DIAGNOSIS — R09.81 SINUS CONGESTION: ICD-10-CM

## 2024-07-16 DIAGNOSIS — E55.9 VITAMIN D DEFICIENCY: ICD-10-CM

## 2024-07-16 DIAGNOSIS — F90.9 ATTENTION DEFICIT HYPERACTIVITY DISORDER (ADHD), UNSPECIFIED ADHD TYPE: ICD-10-CM

## 2024-07-16 DIAGNOSIS — E10.65 TYPE 1 DIABETES MELLITUS WITH HYPERGLYCEMIA: Primary | ICD-10-CM

## 2024-07-16 DIAGNOSIS — E10.42 TYPE 1 DIABETES MELLITUS WITH DIABETIC POLYNEUROPATHY: ICD-10-CM

## 2024-07-16 DIAGNOSIS — F32.A DEPRESSION, UNSPECIFIED DEPRESSION TYPE: ICD-10-CM

## 2024-07-16 DIAGNOSIS — F41.9 ANXIETY: ICD-10-CM

## 2024-07-16 DIAGNOSIS — H93.11 TINNITUS, RIGHT EAR: ICD-10-CM

## 2024-07-16 DIAGNOSIS — R80.9 MICROALBUMINURIA: ICD-10-CM

## 2024-07-16 PROBLEM — E87.5 HYPERKALEMIA: Status: RESOLVED | Noted: 2022-12-02 | Resolved: 2024-07-16

## 2024-07-16 PROCEDURE — 3074F SYST BP LT 130 MM HG: CPT | Mod: CPTII,,, | Performed by: NURSE PRACTITIONER

## 2024-07-16 PROCEDURE — 3066F NEPHROPATHY DOC TX: CPT | Mod: CPTII,,, | Performed by: NURSE PRACTITIONER

## 2024-07-16 PROCEDURE — 3078F DIAST BP <80 MM HG: CPT | Mod: CPTII,,, | Performed by: NURSE PRACTITIONER

## 2024-07-16 PROCEDURE — 1160F RVW MEDS BY RX/DR IN RCRD: CPT | Mod: CPTII,,, | Performed by: NURSE PRACTITIONER

## 2024-07-16 PROCEDURE — 1159F MED LIST DOCD IN RCRD: CPT | Mod: CPTII,,, | Performed by: NURSE PRACTITIONER

## 2024-07-16 PROCEDURE — 99214 OFFICE O/P EST MOD 30 MIN: CPT | Mod: PBBFAC,PN | Performed by: NURSE PRACTITIONER

## 2024-07-16 PROCEDURE — 99214 OFFICE O/P EST MOD 30 MIN: CPT | Mod: S$PBB,,, | Performed by: NURSE PRACTITIONER

## 2024-07-16 PROCEDURE — 3046F HEMOGLOBIN A1C LEVEL >9.0%: CPT | Mod: CPTII,,, | Performed by: NURSE PRACTITIONER

## 2024-07-16 PROCEDURE — 3062F POS MACROALBUMINURIA REV: CPT | Mod: CPTII,,, | Performed by: NURSE PRACTITIONER

## 2024-07-16 PROCEDURE — 3008F BODY MASS INDEX DOCD: CPT | Mod: CPTII,,, | Performed by: NURSE PRACTITIONER

## 2024-07-16 RX ORDER — ROSUVASTATIN CALCIUM 5 MG/1
5 TABLET, COATED ORAL DAILY
Qty: 90 TABLET | Refills: 3 | Status: SHIPPED | OUTPATIENT
Start: 2024-07-16 | End: 2024-07-16 | Stop reason: SDUPTHER

## 2024-07-16 RX ORDER — DEXTROAMPHETAMINE SACCHARATE, AMPHETAMINE ASPARTATE, DEXTROAMPHETAMINE SULFATE AND AMPHETAMINE SULFATE 5; 5; 5; 5 MG/1; MG/1; MG/1; MG/1
1 TABLET ORAL 2 TIMES DAILY
Qty: 60 TABLET | Refills: 0 | Status: SHIPPED | OUTPATIENT
Start: 2024-08-08

## 2024-07-16 RX ORDER — BLOOD-GLUCOSE SENSOR
1 EACH MISCELLANEOUS DAILY
Qty: 5 EACH | Refills: 11 | Status: SHIPPED | OUTPATIENT
Start: 2024-07-16 | End: 2025-07-16

## 2024-07-16 RX ORDER — METOCLOPRAMIDE 5 MG/1
5 TABLET ORAL 4 TIMES DAILY
Qty: 120 TABLET | Refills: 3 | Status: SHIPPED | OUTPATIENT
Start: 2024-07-16

## 2024-07-16 RX ORDER — DEXTROAMPHETAMINE SACCHARATE, AMPHETAMINE ASPARTATE, DEXTROAMPHETAMINE SULFATE AND AMPHETAMINE SULFATE 5; 5; 5; 5 MG/1; MG/1; MG/1; MG/1
1 TABLET ORAL DAILY
Qty: 60 TABLET | Refills: 0 | Status: SHIPPED | OUTPATIENT
Start: 2024-10-08

## 2024-07-16 RX ORDER — ROSUVASTATIN CALCIUM 5 MG/1
5 TABLET, COATED ORAL DAILY
Qty: 90 TABLET | Refills: 3 | Status: SHIPPED | OUTPATIENT
Start: 2024-07-16 | End: 2025-07-16

## 2024-07-16 RX ORDER — PAROXETINE HYDROCHLORIDE 20 MG/1
20 TABLET, FILM COATED ORAL EVERY MORNING
Qty: 30 TABLET | Refills: 3 | Status: SHIPPED | OUTPATIENT
Start: 2024-07-16

## 2024-07-16 RX ORDER — DEXTROAMPHETAMINE SACCHARATE, AMPHETAMINE ASPARTATE, DEXTROAMPHETAMINE SULFATE AND AMPHETAMINE SULFATE 5; 5; 5; 5 MG/1; MG/1; MG/1; MG/1
1 TABLET ORAL DAILY
Qty: 60 TABLET | Refills: 0 | Status: SHIPPED | OUTPATIENT
Start: 2024-09-08

## 2024-07-16 NOTE — ASSESSMENT & PLAN NOTE
Will continue to monitor moving forward. Pt states he was not taking care of himself as he should and is now in a better place.    Consider ACE inhibitor very low dose when we speak again in 3 months. BP today is 109/74

## 2024-07-16 NOTE — PROGRESS NOTES
Patient Name: Anthony Hadley     : 1985    MRN: [id number]     Subjective:     Patient ID: Anthony Hadley is a 38 y.o. male.    Chief Complaint:   Chief Complaint   Patient presents with    Follow-up     Pt is here for follow up.         HPI: 24: Review of labs 2 weeks.     Today, pt requesting referral to ENT for an issue with a nodule behind his right ear previously told was cancer and would like evaluated along with his sinuses that he has been told would need surgical intervention.     Microalbuminuria-  Review of labs revealed Microalbumin over 900.  He is not on any medication to protect his kidneys at this time. His BP is 109/74 today.  Long standing hx Type 1 DM and recently has not been managing well.  His A1c was 9.2 on labs.      Type 1 DM-  He has been Type 1 diabetic since . He is on an insulin pump.  He was told that a referral would be placed to Endocrinology here at [facility] for management of his disease process. He refused to see Endocrine citing that they all treat him like crap.  He is amenable to seeing wound care for diabetic foot care and also wants dermatology referral for scalp lesions that he has tried several OTC preparations for and nothing is working.  Pictures in Epic.      Heart disease-  He has hx heart disease but states was never told what type of heart disease.  Records from  indicate a ALLAN was done and showed the following:    ALLAN 22  Technically difficult study   Normal thickness left ventricle with an ejection fraction estimated normal   around 55%   Mild mitral regurgitation   Mild tricuspid regurgitation   Pulmonary pressures mildly elevated RVSP estimated 34 mmHg   No pericardial effusion     ADHD/Anxiety/Depression-  Pt requesting referral to .  He is dyslexic as well.  He is on Adderall 20mg po BID.             ROS:      Review of Systems   HENT:  Positive for congestion and tinnitus. Negative for sore throat.    Respiratory: Negative.     Cardiovascular:  Negative.    Musculoskeletal:  Positive for joint pain and myalgias.   Neurological:  Positive for dizziness.   Psychiatric/Behavioral:  Positive for depression. The patient is nervous/anxious.    All other systems reviewed and are negative.         History:     Past Medical History:   Diagnosis Date    Anxiety     Depression     Diabetes mellitus type I     Hyperkalemia 12/02/2022        Past Surgical History:   Procedure Laterality Date    APPENDECTOMY      CHOLECYSTECTOMY      FOOT SURGERY      bone spure and fracture       Family History   Problem Relation Name Age of Onset    Heart attack Mother      Heart disease Father      Diabetes Father      Crohn's disease Father          Social History     Tobacco Use    Smoking status: Never    Smokeless tobacco: Never   Substance and Sexual Activity    Alcohol use: Yes     Comment: socially    Drug use: Yes     Types: Marijuana    Sexual activity: Yes       Current Outpatient Medications   Medication Instructions    blood-glucose sensor (DEXCOM G7 SENSOR) Haley 1 Device, Misc.(Non-Drug; Combo Route), Daily    [START ON 8/8/2024] dextroamphetamine-amphetamine (ADDERALL) 20 mg tablet 1 tablet, Oral, 2 times daily    [START ON 9/8/2024] dextroamphetamine-amphetamine (ADDERALL) 20 mg tablet 1 tablet, Oral, Daily    [START ON 10/8/2024] dextroamphetamine-amphetamine (ADDERALL) 20 mg tablet 1 tablet, Oral, Daily    insulin lispro protamin-lispro (HUMALOG 50/50) 100 unit/mL (50-50) Susp 100 Units, Subcutaneous, Daily    metoclopramide HCl (REGLAN) 5 mg, Oral, 4 times daily    mupirocin (BACTROBAN) 2 % ointment Topical (Top), 3 times daily    paroxetine (PAXIL) 20 mg, Oral, Every morning    rosuvastatin (CRESTOR) 5 mg, Oral, Daily        Review of patient's allergies indicates:   Allergen Reactions    Meropenem Anaphylaxis     Cardiac arrest     Pcn [penicillins] Hives    Topamax [topiramate] Hives       Objective:     Visit Vitals  /74 (BP Location: Left arm, Patient  "Position: Sitting, BP Method: Large (Automatic))   Pulse 90   Temp 98.1 °F (36.7 °C) (Oral)   Resp 18   Ht 5' 10" (1.778 m)   Wt 73.9 kg (163 lb)   SpO2 99%   BMI 23.39 kg/m²       Physical Examination:     Physical Exam  Vitals and nursing note reviewed.   Constitutional:       Appearance: Normal appearance. He is well-developed and well-groomed.   HENT:      Head: Normocephalic and atraumatic.      Nose: Nose normal.      Mouth/Throat:      Mouth: Mucous membranes are moist.   Eyes:      Extraocular Movements: Extraocular movements intact.      Pupils: Pupils are equal, round, and reactive to light.   Cardiovascular:      Rate and Rhythm: Normal rate and regular rhythm.      Pulses: Normal pulses.           Carotid pulses are 2+ on the right side and 2+ on the left side.       Radial pulses are 2+ on the right side and 2+ on the left side.        Femoral pulses are 2+ on the right side and 2+ on the left side.       Popliteal pulses are 2+ on the right side and 2+ on the left side.        Dorsalis pedis pulses are 2+ on the right side and 2+ on the left side.        Posterior tibial pulses are 2+ on the right side and 2+ on the left side.      Heart sounds: Normal heart sounds.   Pulmonary:      Effort: Pulmonary effort is normal.      Breath sounds: Normal breath sounds. No decreased breath sounds, wheezing, rhonchi or rales.   Abdominal:      General: Abdomen is flat. Bowel sounds are normal.      Palpations: Abdomen is soft.   Musculoskeletal:         General: Normal range of motion.      Cervical back: Full passive range of motion without pain, normal range of motion and neck supple.      Right lower leg: No edema.      Left lower leg: No edema.   Lymphadenopathy:      Cervical: No cervical adenopathy.   Skin:     General: Skin is warm and dry.      Capillary Refill: Capillary refill takes less than 2 seconds.   Neurological:      General: No focal deficit present.      Mental Status: He is alert and oriented " to person, place, and time.      GCS: GCS eye subscore is 4. GCS verbal subscore is 5. GCS motor subscore is 6.      Cranial Nerves: Cranial nerves 2-12 are intact.      Sensory: Sensation is intact.      Motor: Motor function is intact.      Coordination: Coordination is intact.      Gait: Gait is intact.   Psychiatric:         Attention and Perception: Attention and perception normal.         Mood and Affect: Mood and affect normal.         Speech: Speech normal.         Behavior: Behavior normal. Behavior is cooperative.         Thought Content: Thought content normal.         Cognition and Memory: Cognition and memory normal.         Judgment: Judgment normal.         Lab Results:     Chemistry:  Lab Results   Component Value Date     07/01/2024    K 4.9 07/01/2024    BUN 31.1 (H) 07/01/2024    CREATININE 1.13 07/01/2024    EGFRNORACEVR >60 07/01/2024    GLUCOSE 154 (H) 07/01/2024    CALCIUM 9.3 07/01/2024    ALKPHOS 73 07/01/2024    LABPROT 7.3 07/01/2024    ALBUMIN 3.1 (L) 07/01/2024    AST 21 07/01/2024    ALT 13 07/01/2024    HHOTUJZV23VF 26 (L) 07/01/2024    TSH 1.671 07/01/2024    PQQTIT3HLQN 1.13 07/01/2024        Lab Results   Component Value Date    HGBA1C 9.2 (H) 07/01/2024        Hematology:  Lab Results   Component Value Date    WBC 9.44 07/01/2024    HGB 11.5 (L) 07/01/2024    HCT 34.3 (L) 07/01/2024     07/01/2024       Lipid Panel:  Lab Results   Component Value Date    CHOL 183 07/01/2024    HDL 49 07/01/2024    .00 07/01/2024    TRIG 115 07/01/2024    TOTALCHOLEST 4 07/01/2024        Urine:  Lab Results   Component Value Date    APPEARANCEUA Clear 07/01/2024    SGUA 1.023 07/01/2024    PROTEINUA 2+ (A) 07/01/2024    KETONESUA Negative 07/01/2024    LEUKOCYTESUR Negative 07/01/2024    RBCUA 0-5 07/01/2024    WBCUA 0-5 07/01/2024    BACTERIA Moderate (A) 07/01/2024    SQEPUA Trace (A) 07/01/2024    HYALINECASTS None Seen 07/01/2024    CREATRANDUR 230.0 (H) 07/01/2024     PROTEINURINE 61.5 02/04/2024        Assessment:          ICD-10-CM ICD-9-CM   1. Type 1 diabetes mellitus with hyperglycemia  E10.65 250.01   2. Tinnitus, right ear  H93.11 388.30   3. Sinus congestion  R09.81 478.19   4. Anxiety  F41.9 300.00   5. Depression, unspecified depression type  F32.A 311   6. Attention deficit hyperactivity disorder (ADHD), unspecified ADHD type  F90.9 314.01   7. Microalbuminuria  R80.9 791.0   8. Vitamin D deficiency  E55.9 268.9   9. Type 1 diabetes mellitus with diabetic polyneuropathy  E10.42 250.61     357.2        Plan:     1. Type 1 diabetes mellitus with hyperglycemia  -     Discontinue: rosuvastatin (CRESTOR) 5 MG tablet; Take 1 tablet (5 mg total) by mouth once daily.  Dispense: 90 tablet; Refill: 3  -     rosuvastatin (CRESTOR) 5 MG tablet; Take 1 tablet (5 mg total) by mouth once daily.  Dispense: 90 tablet; Refill: 3  -     blood-glucose sensor (DEXCOM G7 SENSOR) Haley; 1 Device by Misc.(Non-Drug; Combo Route) route once daily.  Dispense: 5 each; Refill: 11  -     insulin lispro protamin-lispro (HUMALOG 50/50) 100 unit/mL (50-50) Susp; Inject 100 Units into the skin once daily.  Dispense: 10 mL; Refill: 3    2. Tinnitus, right ear  Assessment & Plan:  Pt requesting referral to ENT clinic for evaluation      3. Sinus congestion  -     Ambulatory referral/consult to ENT; Future; Expected date: 07/23/2024    4. Anxiety  -     paroxetine (PAXIL) 20 MG tablet; Take 1 tablet (20 mg total) by mouth every morning.  Dispense: 30 tablet; Refill: 3    5. Depression, unspecified depression type  -     paroxetine (PAXIL) 20 MG tablet; Take 1 tablet (20 mg total) by mouth every morning.  Dispense: 30 tablet; Refill: 3    6. Attention deficit hyperactivity disorder (ADHD), unspecified ADHD type  Assessment & Plan:  Will schedule virtual appointment in 3 months for ADHD refills  Rx sent today for August and September refills.     Orders:  -     dextroamphetamine-amphetamine (ADDERALL) 20 mg  tablet; Take 1 tablet by mouth 2 (two) times a day.  Dispense: 60 tablet; Refill: 0  -     dextroamphetamine-amphetamine (ADDERALL) 20 mg tablet; Take 1 tablet by mouth once daily.  Dispense: 60 tablet; Refill: 0  -     dextroamphetamine-amphetamine (ADDERALL) 20 mg tablet; Take 1 tablet by mouth once daily.  Dispense: 60 tablet; Refill: 0    7. Microalbuminuria  Overview:   Latest Reference Range & Units 07/01/24 08:33   Urine Creatinine 63.0 - 166.0 mg/dL 230.0 (H)   Urine Microalbumin <=30.0 ug/mL 987.4 (H)   (H): Data is abnormally high    Assessment & Plan:  Will continue to monitor moving forward. Pt states he was not taking care of himself as he should and is now in a better place.    Consider ACE inhibitor very low dose when we speak again in 3 months. BP today is 109/74        8. Vitamin D deficiency  Assessment & Plan:  Last vitamin D was 26 on labs.    Recommend vitamin supplementation at this point to increase.       9. Type 1 diabetes mellitus with diabetic polyneuropathy  Assessment & Plan:  Pending Endocrine appointment  Meds sent to [facility], pt recently filled medication at another pharmacy      Other orders  -     metoclopramide HCl (REGLAN) 5 MG tablet; Take 1 tablet (5 mg total) by mouth 4 (four) times daily.  Dispense: 120 tablet; Refill: 3         Follow up in about 3 months (around 10/16/2024) for adhd-med refills, Virtual Visit.    Future Appointments   Date Time Provider Department Center   8/13/2024 10:00 AM Priscilla Woo FNP [facility]   8/27/2024 12:00 PM [facility]   9/19/2024 11:45 AM Ruben Cormier MD [facility] [place]   10/17/2024 12:45 PM Nasrin Arambula FNP [facility]   11/6/2024  8:00 AM Kelli Amezquita MD [facility] [place]        PUNEET Lo

## 2024-07-16 NOTE — ASSESSMENT & PLAN NOTE
Will schedule virtual appointment in 3 months for ADHD refills  Rx sent today for August and September refills.

## 2024-07-16 NOTE — ASSESSMENT & PLAN NOTE
Pending Endocrine appointment  Meds sent to [facility], pt recently filled medication at another pharmacy

## 2024-07-18 ENCOUNTER — SOCIAL WORK (OUTPATIENT)
Dept: ADMINISTRATIVE | Facility: OTHER | Age: 39
End: 2024-07-18
Payer: MEDICAID

## 2024-07-18 NOTE — PROGRESS NOTES
Sw received a referral to assist with [facility] resources. The Psych Clinic had received a consult to see Patient. However, the Clinic is unable to. Sw mailed Patient a letter explaining this. He was provided the contact information for [facility]. Sw encouraged Patient to contact them to schedule an assessment if he was still in need of services.    Lily Meeks LCSW    [phone]

## 2024-08-02 ENCOUNTER — SOCIAL WORK (OUTPATIENT)
Dept: ADMINISTRATIVE | Facility: OTHER | Age: 39
End: 2024-08-02
Payer: MEDICAID

## 2024-08-02 NOTE — PROGRESS NOTES
Sw received a referral to assist with [facility] resources. The Psych Clinic had received a consult to see Patient. However, the Clinic is unable to. Sw mailed Patient a letter explaining this. He was provided the contact information for [facility]. He was encouraged to contact them to schedule an assessment if he was still in need of services.    Lily Meeks LCSW    [phone]

## 2024-08-09 ENCOUNTER — OFFICE VISIT (OUTPATIENT)
Dept: OTOLARYNGOLOGY | Facility: CLINIC | Age: 39
End: 2024-08-09
Payer: MEDICAID

## 2024-08-09 DIAGNOSIS — H93.11 TINNITUS OF RIGHT EAR: ICD-10-CM

## 2024-08-09 DIAGNOSIS — R44.8 FACIAL PRESSURE: ICD-10-CM

## 2024-08-09 DIAGNOSIS — R09.81 NASAL CONGESTION: ICD-10-CM

## 2024-08-09 DIAGNOSIS — H91.90 HEARING LOSS, UNSPECIFIED HEARING LOSS TYPE, UNSPECIFIED LATERALITY: Primary | ICD-10-CM

## 2024-08-09 DIAGNOSIS — H69.90 ETD (EUSTACHIAN TUBE DYSFUNCTION), UNSPECIFIED LATERALITY: ICD-10-CM

## 2024-08-09 DIAGNOSIS — J30.89 NON-SEASONAL ALLERGIC RHINITIS, UNSPECIFIED TRIGGER: ICD-10-CM

## 2024-08-13 ENCOUNTER — HOSPITAL ENCOUNTER (OUTPATIENT)
Dept: WOUND CARE | Facility: HOSPITAL | Age: 39
Discharge: HOME | End: 2024-08-13
Attending: NURSE PRACTITIONER
Payer: MEDICAID

## 2024-08-13 VITALS
TEMPERATURE: 98 F | HEART RATE: 89 BPM | DIASTOLIC BLOOD PRESSURE: 75 MMHG | RESPIRATION RATE: 20 BRPM | OXYGEN SATURATION: 100 % | SYSTOLIC BLOOD PRESSURE: 112 MMHG

## 2024-08-13 DIAGNOSIS — L84 CALLUS OF FOOT: ICD-10-CM

## 2024-08-13 DIAGNOSIS — E10.65 TYPE 1 DIABETES MELLITUS WITH HYPERGLYCEMIA: ICD-10-CM

## 2024-08-13 DIAGNOSIS — L60.2 OVERGROWN TOENAILS: ICD-10-CM

## 2024-08-13 DIAGNOSIS — Z79.899 MEDICAL MARIJUANA USE: ICD-10-CM

## 2024-08-13 DIAGNOSIS — E11.9 ENCOUNTER FOR DIABETIC FOOT EXAM: Primary | ICD-10-CM

## 2024-08-13 DIAGNOSIS — Z72.0 TOBACCO USE: ICD-10-CM

## 2024-08-13 PROCEDURE — 11719 TRIM NAIL(S) ANY NUMBER: CPT

## 2024-08-13 PROCEDURE — 99213 OFFICE O/P EST LOW 20 MIN: CPT | Mod: 25,,, | Performed by: NURSE PRACTITIONER

## 2024-08-13 PROCEDURE — 99211 OFF/OP EST MAY X REQ PHY/QHP: CPT | Mod: 25

## 2024-08-13 PROCEDURE — 11055 PARING/CUTG B9 HYPRKER LES 1: CPT

## 2024-08-13 PROCEDURE — 27000999 HC MEDICAL RECORD PHOTO DOCUMENTATION

## 2024-08-13 PROCEDURE — 11055 PARING/CUTG B9 HYPRKER LES 1: CPT | Mod: ,,, | Performed by: NURSE PRACTITIONER

## 2024-08-13 PROCEDURE — 11719 TRIM NAIL(S) ANY NUMBER: CPT | Mod: 59,,, | Performed by: NURSE PRACTITIONER

## 2024-08-13 NOTE — LETTER
August 13, 2024      [facility]  [address] [address]  Phone: [phone]  Fax: [phone]       Patient: Anthony Hadley   YOB: 1985  Date of Visit: 08/13/2024    To Whom It May Concern:    Heather Hadley  was at [facility] on 08/13/2024. The patient may return to work on 8/13/24 with no restrictions. If you have any questions or concerns, or if I can be of further assistance, please do not hesitate to contact me.    Sincerely,        PUNEET Ayala

## 2024-08-14 PROBLEM — L60.2 OVERGROWN TOENAILS: Status: ACTIVE | Noted: 2024-08-14

## 2024-08-14 PROBLEM — E11.9 ENCOUNTER FOR DIABETIC FOOT EXAM: Status: ACTIVE | Noted: 2024-08-14

## 2024-08-14 PROBLEM — Z79.899 MEDICAL MARIJUANA USE: Status: ACTIVE | Noted: 2024-08-14

## 2024-08-14 PROBLEM — E10.65 TYPE 1 DIABETES MELLITUS WITH HYPERGLYCEMIA: Status: ACTIVE | Noted: 2024-08-14

## 2024-08-14 PROBLEM — Z72.0 TOBACCO USE: Status: ACTIVE | Noted: 2024-08-14

## 2024-08-14 PROBLEM — L84 CALLUS OF FOOT: Status: ACTIVE | Noted: 2024-08-14

## 2024-08-14 NOTE — PROGRESS NOTES
[facility]   Outpatient Wound Care     Subjective:   Patient ID: Anthony Hadley is a 38 y.o. male.    Chief Complaint: DFC      History of Present Illness:   38 y.o. White male presents to wound care clinic today for diabetic foot care to become established with wound care clinic.  Presents to clinic alone.   Followed by Nasrin Arambula FNP for PCP last appointment 07/16/2024.     Today's visit 08/13/2024:  Diabetic foot exam performed at bedside.  Monofilament test done sensation noted in all areas.  Trimmed all 10 toenails using podiatry clippers, and filed with Drain board.  Pared right great toe callus using # dermal curette.  Rationale for paring is to decrease pressure and alleviate pain to area.  Discussion with the patient today regarding proper footwear.  Instructed on nail care, the importance of checking feet daily due to high risk for diabetic foot ulcers, and medication compliance.  Reinforced diabetic education during exam patient has insulin pump was alarming patient blood glucose on machine 250's.  Informed the patient the importance of following diet, exercise, and taking all diabetic medications as directed.  Last A1c was 9.2 patient voices he was out of his medications but has them now.  Will have him return to the clinic in 3 months for diabetic foot care.  Instructed to call the office with any questions, concerns, or new skin issues.  Verbalized understanding of all instructions.      History includes:      Past Medical History:   Diagnosis Date    Anxiety     Depression     Diabetes mellitus type I     Hyperkalemia 12/02/2022      Past Surgical History:   Procedure Laterality Date    APPENDECTOMY      CHOLECYSTECTOMY      FOOT SURGERY      bone spure and fracture      Social History     Socioeconomic History    Marital status:     Tobacco Use    Smoking status: Every Day     Types: Cigarettes    Smokeless tobacco: Never    Tobacco comments:     Medical weed   Substance and Sexual Activity    Alcohol use: Yes     Comment: socially    Drug use: Yes     Types: Marijuana    Sexual activity: Yes   .      Current Outpatient Medications   Medication Sig Dispense Refill    dextroamphetamine-amphetamine (ADDERALL) 20 mg tablet Take 1 tablet by mouth 2 (two) times a day. 60 tablet 0    [START ON 9/8/2024] dextroamphetamine-amphetamine (ADDERALL) 20 mg tablet Take 1 tablet by mouth once daily. 60 tablet 0    [START ON 10/8/2024] dextroamphetamine-amphetamine (ADDERALL) 20 mg tablet Take 1 tablet by mouth once daily. 60 tablet 0    insulin lispro protamin-lispro (HUMALOG 50/50) 100 unit/mL (50-50) Susp Inject 100 Units into the skin once daily. 10 mL 3    metoclopramide HCl (REGLAN) 5 MG tablet Take 1 tablet (5 mg total) by mouth 4 (four) times daily. 120 tablet 3    mupirocin (BACTROBAN) 2 % ointment Apply topically 3 (three) times daily. 22 g 0    NON FORMULARY MEDICATION Pt states uses Medical Marijuana as needed      rosuvastatin (CRESTOR) 5 MG tablet Take 1 tablet (5 mg total) by mouth once daily. 90 tablet 3    paroxetine (PAXIL) 20 MG tablet Take 1 tablet (20 mg total) by mouth every morning. (Patient not taking: Reported on 8/9/2024) 30 tablet 3     No current facility-administered medications for this encounter.       Review of Systems   All other systems reviewed and are negative.         Labs Reviewed:   Chemistry:  Lab Results   Component Value Date    BUN 31.1 (H) 07/01/2024    BUN 28 (H) 06/09/2024    CREATININE 1.13 07/01/2024    CREATININE 0.97 06/09/2024    EGFRNORACEVR >60 07/01/2024    GLUCOSE 154 (H) 07/01/2024    AST 21 07/01/2024    ALT 13 07/01/2024    HGBA1C 9.2 (H) 07/01/2024        Hematology:  Lab Results   Component Value Date    WBC 9.44 07/01/2024    HGB 11.5 (L) 07/01/2024    HCT 34.3 (L) 07/01/2024     07/01/2024  "      Inflammatory Markers:  No results found for: "HSCRP", "SEDRATE"     Objective:        Physical Exam  Vitals reviewed.   Cardiovascular:      Pulses:           Dorsalis pedis pulses are 2+ on the right side and 2+ on the left side.        Posterior tibial pulses are 2+ on the right side and 2+ on the left side.   Feet:      Right foot:      Skin integrity: Callus present.      Toenail Condition: Right toenails are long.      Left foot:      Toenail Condition: Left toenails are long.   Skin:     General: Skin is warm and dry.      Capillary Refill: Capillary refill takes less than 2 seconds.   Neurological:      Mental Status: He is alert.                        Assessment:         ICD-10-CM ICD-9-CM   1. Encounter for diabetic foot exam  E11.9 250.00   2. Overgrown toenails  L60.2 703.8   3. Callus of foot  L84 700   4. Type 1 diabetes mellitus with hyperglycemia  E10.65 250.01   5. Tobacco use  Z72.0 305.1   6. Medical marijuana use  Z79.899 V58.69         Plan:   Tissue pathology and/or culture taken:  [] Yes [x] No   Sharp debridement performed:   [] Yes [x] No   Labs ordered this visit:   [] Yes [x] No   Imaging ordered this visit:   [] Yes [x] No         1. Encounter for diabetic foot exam     DFC done.  Instructed on proper foot care.   2. Overgrown toenails     Trimmed.  Instructed on proper nail care.   3. Callus of foot     Pared.  Instructed on proper footwear.   4. Type 1 diabetes mellitus with hyperglycemia     Last A1c 9.2    Must have a strict diabetic diet, take glucose lowering medications as prescribed and encourage lower HA1C.   5. Tobacco use     Must stop smoking. Explained the negative impact this has on not only the wounds (delayed healing), but overall health as well.     6. Medical marijuana use     Uses for anxiety.       The time spent including preparing to see the patient, obtaining patient history and assessment, evaluation of the plan of care, patient/caregiver counseling and " education, orders, documentation, coordination of care, and other professional medical management activities for today's encounter was 25 minute.    Time spent performing procedures during today's encounter was 20 minute.    Follow up in about 3 months (around 11/13/2024). Teaching provided on s/s to call wound clinic for promptly.  ER precautions taught for after hours and weekends.         Priscilla Woo, PUNEET

## 2024-09-10 ENCOUNTER — PATIENT MESSAGE (OUTPATIENT)
Dept: FAMILY MEDICINE | Facility: CLINIC | Age: 39
End: 2024-09-10
Payer: MEDICAID

## 2024-09-12 ENCOUNTER — PATIENT MESSAGE (OUTPATIENT)
Dept: FAMILY MEDICINE | Facility: CLINIC | Age: 39
End: 2024-09-12
Payer: MEDICAID

## 2024-09-13 ENCOUNTER — TELEPHONE (OUTPATIENT)
Dept: FAMILY MEDICINE | Facility: CLINIC | Age: 39
End: 2024-09-13
Payer: MEDICAID

## 2024-09-13 NOTE — TELEPHONE ENCOUNTER
----- Message from Prema Pérez sent at 9/13/2024  9:29 AM CDT -----  Regarding: pharmacy call  Who Called: Anthony Hadley    Pharmacy is calling to request assistance with Rx    [facility] phone [id number] - [address] [place]   Phone: [phone]    Fax: [phone]        Pharmacy contact: Yomaira    Patient Name: Pt    Prescription Name: dextroamphetamine-amphetamine (ADDERALL) 20 mg tablet 60 tablet 0 9/8/2024 - No  Sig - Route: Take 1 tablet by mouth once daily. - Oral       What do they need to clarify?:needs a diagnosis code        Preferred Method of Contact: Phone Call  Patient's Preferred Phone Number on File: [phone]   Best Call Back Number, if different:  Additional Information: please contact pharmacy.; ABL, was instructed to take message.

## 2024-09-16 ENCOUNTER — TELEPHONE (OUTPATIENT)
Dept: BEHAVIORAL HEALTH | Facility: CLINIC | Age: 39
End: 2024-09-16
Payer: MEDICAID

## 2024-09-16 DIAGNOSIS — E10.65 TYPE 1 DIABETES MELLITUS WITH HYPERGLYCEMIA: ICD-10-CM

## 2024-09-16 NOTE — TELEPHONE ENCOUNTER
Pharmacy called   The Humalog 50/50 vial has been discontinued  The Quick pens are available  Please send new rx for the Quick pens  Thank you  Anushka Oswald, BECKY  9/16/2024, 9:02 AM

## 2024-09-16 NOTE — TELEPHONE ENCOUNTER
Spoke to patient he stated that he does have enough insulin to last him until his Endocrinologist appt. I instructed pt to call the office if he needed anything .  Pt verbalized understanding

## 2024-09-17 ENCOUNTER — CLINICAL SUPPORT (OUTPATIENT)
Dept: AUDIOLOGY | Facility: HOSPITAL | Age: 39
End: 2024-09-17
Payer: MEDICAID

## 2024-09-17 ENCOUNTER — OFFICE VISIT (OUTPATIENT)
Dept: OTOLARYNGOLOGY | Facility: CLINIC | Age: 39
End: 2024-09-17
Payer: MEDICAID

## 2024-09-17 VITALS
OXYGEN SATURATION: 99 % | BODY MASS INDEX: 23.33 KG/M2 | HEIGHT: 70 IN | RESPIRATION RATE: 18 BRPM | WEIGHT: 162.94 LBS | HEART RATE: 72 BPM | TEMPERATURE: 99 F | DIASTOLIC BLOOD PRESSURE: 77 MMHG | SYSTOLIC BLOOD PRESSURE: 129 MMHG

## 2024-09-17 DIAGNOSIS — H93.11 TINNITUS OF RIGHT EAR: ICD-10-CM

## 2024-09-17 DIAGNOSIS — H93.11 TINNITUS OF RIGHT EAR: Primary | ICD-10-CM

## 2024-09-17 DIAGNOSIS — H90.71 MIXED CONDUCTIVE AND SENSORINEURAL HEARING LOSS OF RIGHT EAR WITH UNRESTRICTED HEARING OF LEFT EAR: Primary | ICD-10-CM

## 2024-09-17 DIAGNOSIS — H91.90 HEARING LOSS, UNSPECIFIED HEARING LOSS TYPE, UNSPECIFIED LATERALITY: ICD-10-CM

## 2024-09-17 DIAGNOSIS — J32.4 CHRONIC PANSINUSITIS: ICD-10-CM

## 2024-09-17 PROCEDURE — 92557 COMPREHENSIVE HEARING TEST: CPT | Performed by: AUDIOLOGIST-HEARING AID FITTER

## 2024-09-17 PROCEDURE — 92567 TYMPANOMETRY: CPT | Performed by: AUDIOLOGIST-HEARING AID FITTER

## 2024-09-17 PROCEDURE — 99214 OFFICE O/P EST MOD 30 MIN: CPT | Mod: PBBFAC,25 | Performed by: STUDENT IN AN ORGANIZED HEALTH CARE EDUCATION/TRAINING PROGRAM

## 2024-09-17 NOTE — PROGRESS NOTES
Audiological Evaluation    Patient History:    Patient evaluated today to assess hearing.  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  A history of middle ear involvement/otologic procedures have been denied at this time.  Patient's medical history has reportedly not changed since most recent medical evaluation.       Pure Tone Testing:    Right ear:       Mild, mixed HL    Left ear:          Essentially normal hearing sensitivity w/ the exception of a mild decrease at 3000 Hz        Tympanometry:      Right ear:   Type 'A' tympanogram    Left ear: Type 'A' tympanogram           Acoustic Reflex Testing    Right ear:   Did not test     Left ear: Did not test       DPOAE Testing    Right ear:  Present emissions for the frequencies 1500, 3000 and 4000 Hz    Left ear: Present emissions for the frequencies 7123-6420 Hz       Interpretations:    Pure tone testing revealed a mild, mixed hearing loss for the right ear.  Testing for the left ear revealed essentially normal hearing sensitivity with the exception of a mild decrease predominantly at 3000 Hz. Speech reception thresholds were obtained at 30 dB HL (right ear) and 20 dB HL (left ear) consistent with pure tone results, bilaterally.  Word recognition scores were excellent, bilaterally.  Immittance testing revealed Type A tympanograms, bilaterally, indicative of normal middle ear function. DPOAEs were present for the test frequencies noted above, bilaterally..  Otoscopy revealed clear EACs, bilaterally.      Recommendations:     Audiological testing annually and/or per ENT  ENT evaluation (9/17/2024)  Hearing protection when exposed to hazardous noise    Marika Edwards  Clinical Audiologist

## 2024-09-17 NOTE — PROGRESS NOTES
[facility]  Otolaryngology Clinic Note    Anthony Hadley  Encounter Date: 9/17/2024  YOB: 1985  Physician: MD Riley    Chief Complaint: hearing, sinus    HPI: 38 y.o. male referred for tinnitus, sinus concerns, and lesion behind right ear? States about a year ago, he began having otalgia and was told by an ED provider that he had cancer based off an MRI finding. States he has bumps behind his right ear which fluctuate in size. He was seen by an ENT in [place] who he states through his MRI disk in the garbage and told him it was just a swollen lymph node. States that doctor also told him he needed sinus surgery. Endorses nasal congestion R>L, rhinitis, facial pressure periorbitally/frontally, & epiphora. He does not take abx frequently for sinus infections but states he occasionally has to take IV abx for recurrent MRSA skin infections. Endorses fluctuant HL and tinnitus to his right ear which resolves briefly with auto-insufflation. Denies otorrhea. States he had recurrent ear infections when he was young but never had PE tubes or otologic surgery, that they got better as he grew up. He uses astelin prn when his nasal symptoms are severe.       9/17/24:  Here for follow-up.  Has been on nasal sprays for the past month without improvement in his symptoms.  Patient reports over the past couple of years he has had at least 20 sinus infections requiring antibiotics.  Patient also has trouble breathing when he is laying down.  Patient has never had sinus surgery.  Never been allergy tested.  Has tried nasal irrigations however he gets nasal vestibulitis very easily and avoids it  Patient also presents for evaluation of a right postauricular lymphadenopathy.  He reports it swells up when he gets sick.  Currently it is not swollen.  He also reports right-sided tinnitus.  Reports occasional hearing loss.  Had an audiogram today which does not show hearing loss.     ROS:   General:  Negative except per HPI  Skin: Denies rash, ulcer, or lesion.  Eyes: Denies vision changes or diplopia.  Ears: Negative except per HPI  Nose: Negative except per HPI  Throat/mouth: Negative except per HPI  Cardiovascular: Negative except per HPI  Respiratory: Negative except per HPI  Neck: Negative except per HPI  Endocrine: Negative except per HPI  Neurologic: Negative except per HPI    Other 10-point review of systems negative except per HPI      Review of patient's allergies indicates:   Allergen Reactions    Meropenem Anaphylaxis     Cardiac arrest     Pcn [penicillins] Hives    Topamax [topiramate] Hives       Past Medical History:   Diagnosis Date    Anxiety     Depression     Diabetes mellitus type I     Hyperkalemia 12/02/2022       Past Surgical History:   Procedure Laterality Date    APPENDECTOMY      CHOLECYSTECTOMY      FOOT SURGERY      bone spure and fracture       Social History     Socioeconomic History    Marital status:    Tobacco Use    Smoking status: Every Day    Smokeless tobacco: Never    Tobacco comments:     Medical weed     Pt states stopped smoking cigs in years (15 years)    Substance and Sexual Activity    Alcohol use: Yes     Comment: socially    Drug use: Yes     Types: Marijuana    Sexual activity: Yes       Family History   Problem Relation Name Age of Onset    Heart attack Mother      Heart disease Father      Diabetes Father      Crohn's disease Father         Outpatient Encounter Medications as of 9/17/2024   Medication Sig Dispense Refill    dextroamphetamine-amphetamine (ADDERALL) 20 mg tablet Take 1 tablet by mouth 2 (two) times a day. 60 tablet 0    [START ON 10/8/2024] dextroamphetamine-amphetamine (ADDERALL) 20 mg tablet Take 1 tablet by mouth once daily. 60 tablet 0    insulin lispro protamin-lispro (HUMALOG 50/50) 100 unit/mL (50-50) Susp Inject 100 Units into the skin once daily. 10 mL 3    metoclopramide HCl (REGLAN) 5 MG tablet Take 1 tablet (5 mg total) by mouth  "4 (four) times daily. 120 tablet 3    mupirocin (BACTROBAN) 2 % ointment Apply topically 3 (three) times daily. 22 g 0    rosuvastatin (CRESTOR) 5 MG tablet Take 1 tablet (5 mg total) by mouth once daily. 90 tablet 3    NON FORMULARY MEDICATION Pt states uses Medical Marijuana as needed      paroxetine (PAXIL) 20 MG tablet Take 1 tablet (20 mg total) by mouth every morning. (Patient not taking: Reported on 8/9/2024) 30 tablet 3    [DISCONTINUED] dextroamphetamine-amphetamine (ADDERALL) 20 mg tablet Take 1 tablet by mouth once daily. 60 tablet 0     No facility-administered encounter medications on file as of 9/17/2024.       Physical Exam:  Vitals:    09/17/24 0936 09/17/24 0938   BP: (!) 146/88 129/77   BP Location: Left arm Left arm   Patient Position: Sitting Sitting   BP Method: Medium (Automatic)    Pulse: 80 72   Resp: 18    Temp: 98.7 °F (37.1 °C)    TempSrc: Oral    SpO2: 99%    Weight: 73.9 kg (162 lb 14.7 oz)    Height: 5' 10" (1.778 m)        Constitutional  General Appearance: well nourished, well-developed, AAO x3, NAD  HEENT:  Tenderness along maxillary sinus  Eyes: PEERLA, EOMI, normal conjunctivae  Ears: Hearing well at conversation level   AD: auricle normal, EAC normal, TM intact, no FRANCHESCA   AS: auricle normal, EAC normal, TM intact, no FRANCHESCA   Vestibular: ambulates without gait disturbance  Nose: septum midline, moderate inferior turbinate hypertrophy, no polyps, moist mucosa without erythema or blue hue  OC/OP: dentition moderate, no oral lesions, tongue/FOM/BOT- soft, no leukoplakia/ulcerations/ tenderness  Nasopharynx, Hypopharynx, and Larynx:    Indirect: attempted, limited view due to patient intolerance  Neck: soft, non-tender, no palpable lymph nodes   Thyroid region- no nodules or goiter  Neuro: CN II - XII intact bilaterally  Cardiovascular: peripheral pulses palpable  Respiratory: non-labored respirations  Psychiatric: oriented to time, place and person, no depression, anxiety or " agitation        Pertinent Data:  ? LABS:  ? AUDIO:     Muna Joshi AU.D   Audiologist  Specialty: Audiology     Progress Notes     Signed     Encounter Date: 9/17/2024                                                                                                                   Audiological Evaluation     Patient History:     Patient evaluated today to assess hearing.  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  A history of middle ear involvement/otologic procedures have been denied at this time.  Patient's medical history has reportedly not changed since most recent medical evaluation.         Pure Tone Testing:     Right ear:       Mild, mixed HL     Left ear:          Essentially normal hearing sensitivity w/ the exception of a mild decrease at 3000 Hz           Tympanometry:       Right ear:       Type 'A' tympanogram     Left ear:          Type 'A' tympanogram                     Acoustic Reflex Testing     Right ear:       Did not test         Left ear:          Did not test            DPOAE Testing     Right ear:       Present emissions for the frequencies 1500, 3000 and 4000 Hz     Left ear:Present emissions for the frequencies 6370-5212 Hz     Interpretations:     Pure tone testing revealed a mild, mixed hearing loss for the right ear.  Testing for the left ear revealed essentially normal hearing sensitivity with the exception of a mild decrease predominantly at 3000 Hz. Speech reception thresholds were obtained at 30 dB HL (right ear) and 20 dB HL (left ear) consistent with pure tone results, bilaterally.  Word recognition scores were excellent, bilaterally.  Immittance testing revealed Type A tympanograms, bilaterally, indicative of normal middle ear function. DPOAEs were present for the test frequencies noted above, bilaterally..  Otoscopy revealed clear EACs, bilaterally.        Recommendations:      Audiological testing annually and/or per ENT  ENT evaluation (9/17/2024)  Hearing protection when exposed to hazardous noise     Bryon Edwards.  Clinical Audiologist              Electronically signed by Muna Joshi AU.D at 9/17/2024  9:29 AM       ? CT Scan:    Imaging:   I personally reviewed the following images:    Summary of Outside Records:      Assessment/Plan: 38 y.o. male with flunctuant HL and tinnitus, AR, likely ETD, ?CRS, subjective right posterior auricular lesions.  Postauricular lesion seems consistent with lymphadenopathy that is reactive.  Patient with recurrent sinus infections on multiple rounds of antibiotics, symptoms not controlled Flonase and Astelin.  Patient also with unilateral tinnitus, unable to locate MRI    - MRI IAC for unilateral tinnitus  - CT max face for sinus surgery evaluation  - RAST testing  - continue Flonase and Astelin  - return to clinic in a month      IAnya Lin MD  [facility] Department of Otolaryngology  \[facility]\""

## 2024-09-19 ENCOUNTER — OFFICE VISIT (OUTPATIENT)
Dept: DERMATOLOGY | Facility: CLINIC | Age: 39
End: 2024-09-19
Payer: MEDICAID

## 2024-09-19 DIAGNOSIS — L73.9 FOLLICULITIS: Primary | ICD-10-CM

## 2024-09-19 DIAGNOSIS — L02.91 ABSCESS: ICD-10-CM

## 2024-09-19 DIAGNOSIS — L98.9 LESION OF SKIN OF SCALP: ICD-10-CM

## 2024-09-19 RX ORDER — CLINDAMYCIN PHOSPHATE 10 MG/ML
SOLUTION TOPICAL DAILY
Qty: 60 EACH | Refills: 2 | Status: SHIPPED | OUTPATIENT
Start: 2024-09-19 | End: 2025-09-19

## 2024-09-19 RX ORDER — MINOCYCLINE HYDROCHLORIDE 100 MG/1
100 CAPSULE ORAL DAILY
Qty: 90 CAPSULE | Refills: 0 | Status: SHIPPED | OUTPATIENT
Start: 2024-09-19

## 2024-09-19 NOTE — PROGRESS NOTES
The patient location is: home/work  The chief complaint leading to consultation is: recurring abscess/staph    Visit type: audiovisual    Face to Face time with patient: 10mins  10 minutes of total time spent on the encounter, which includes face to face time and non-face to face time preparing to see the patient (eg, review of tests), Obtaining and/or reviewing separately obtained history, Documenting clinical information in the electronic or other health record, Independently interpreting results (not separately reported) and communicating results to the patient/family/caregiver, or Care coordination (not separately reported).         Each patient to whom he or she provides medical services by telemedicine is:  (1) informed of the relationship between the physician and patient and the respective role of any other health care provider with respect to management of the patient; and (2) notified that he or she may decline to receive medical services by telemedicine and may withdraw from such care at any time.    History of Present Illness: The patient presents with chief complaint of recurring abscess/staph/folliculitis .  Location: develops mostly on the head and neck, and now starting on the legs  Duration: ongoing for years  Signs/Symptoms: recurring cellulitis, abscesses and slowly healing sores on the skin  Prior treatments: has been on multiple rounds of antibiotics, hospitalizations, topicals all with recurrence of symptoms.       ROS: Otherwise negative    PE: const/neuro - AAOx3, NAD          Skin -       A/P: Folliculitis/abscess - recurring symptoms with multiple rounds of antibiotics, I&D with minimal improvement. Recommend in office visit for possible skin biopsy/culture and further evaluation. For now start minocycline and topicals until appointment.   -     minocycline (MINOCIN,DYNACIN) 100 MG capsule; Take 1 capsule (100 mg total) by mouth once daily.  Dispense: 90 capsule; Refill: 0  -      clindamycin (CLEOCIN T) 1 % Swab; Apply topically once daily.  Dispense: 60 each; Refill: 2

## 2024-09-19 NOTE — PROGRESS NOTES
I have reviewed and agree with the resident's findings, including all diagnostic interpretations and plans as written.     Yohan Cartwright M.D.

## 2024-09-25 ENCOUNTER — HOSPITAL ENCOUNTER (OUTPATIENT)
Dept: RADIOLOGY | Facility: HOSPITAL | Age: 39
Discharge: HOME | End: 2024-09-25
Attending: STUDENT IN AN ORGANIZED HEALTH CARE EDUCATION/TRAINING PROGRAM
Payer: MEDICAID

## 2024-09-25 DIAGNOSIS — J32.4 CHRONIC PANSINUSITIS: ICD-10-CM

## 2024-09-25 DIAGNOSIS — H93.11 TINNITUS OF RIGHT EAR: ICD-10-CM

## 2024-09-25 LAB
CREAT SERPL-MCNC: 1.47 MG/DL (ref 0.73–1.18)
GFR SERPLBLD CREATININE-BSD FMLA CKD-EPI: >60 ML/MIN/1.73/M2

## 2024-09-25 PROCEDURE — 82565 ASSAY OF CREATININE: CPT | Performed by: STUDENT IN AN ORGANIZED HEALTH CARE EDUCATION/TRAINING PROGRAM

## 2024-09-25 PROCEDURE — A9577 INJ MULTIHANCE: HCPCS

## 2024-09-25 PROCEDURE — 25500020 PHARM REV CODE 255

## 2024-09-25 PROCEDURE — 70553 MRI BRAIN STEM W/O & W/DYE: CPT | Mod: TC

## 2024-09-25 PROCEDURE — 70486 CT MAXILLOFACIAL W/O DYE: CPT | Mod: TC

## 2024-09-25 RX ADMIN — GADOBENATE DIMEGLUMINE 16 ML: 529 INJECTION, SOLUTION INTRAVENOUS at 12:09

## 2024-09-30 ENCOUNTER — OFFICE VISIT (OUTPATIENT)
Dept: ENDOCRINOLOGY | Facility: CLINIC | Age: 39
End: 2024-09-30
Payer: MEDICAID

## 2024-09-30 VITALS
WEIGHT: 160.19 LBS | HEART RATE: 79 BPM | RESPIRATION RATE: 18 BRPM | HEIGHT: 70 IN | DIASTOLIC BLOOD PRESSURE: 86 MMHG | BODY MASS INDEX: 22.93 KG/M2 | SYSTOLIC BLOOD PRESSURE: 142 MMHG | OXYGEN SATURATION: 100 %

## 2024-09-30 DIAGNOSIS — R80.9 MICROALBUMINURIA: Primary | ICD-10-CM

## 2024-09-30 DIAGNOSIS — E10.65 TYPE 1 DIABETES MELLITUS WITH HYPERGLYCEMIA: ICD-10-CM

## 2024-09-30 DIAGNOSIS — E55.9 VITAMIN D DEFICIENCY: ICD-10-CM

## 2024-09-30 PROCEDURE — 99214 OFFICE O/P EST MOD 30 MIN: CPT | Mod: PBBFAC | Performed by: PHYSICIAN ASSISTANT

## 2024-09-30 PROCEDURE — 99204 OFFICE O/P NEW MOD 45 MIN: CPT | Mod: S$PBB,,, | Performed by: PHYSICIAN ASSISTANT

## 2024-09-30 PROCEDURE — 3046F HEMOGLOBIN A1C LEVEL >9.0%: CPT | Mod: CPTII,,, | Performed by: PHYSICIAN ASSISTANT

## 2024-09-30 PROCEDURE — 3066F NEPHROPATHY DOC TX: CPT | Mod: CPTII,,, | Performed by: PHYSICIAN ASSISTANT

## 2024-09-30 PROCEDURE — 1159F MED LIST DOCD IN RCRD: CPT | Mod: CPTII,,, | Performed by: PHYSICIAN ASSISTANT

## 2024-09-30 PROCEDURE — 99999 PR PBB SHADOW E&M-EST. PATIENT-LVL IV: CPT | Mod: PBBFAC,,, | Performed by: PHYSICIAN ASSISTANT

## 2024-09-30 PROCEDURE — 3062F POS MACROALBUMINURIA REV: CPT | Mod: CPTII,,, | Performed by: PHYSICIAN ASSISTANT

## 2024-09-30 PROCEDURE — 3008F BODY MASS INDEX DOCD: CPT | Mod: CPTII,,, | Performed by: PHYSICIAN ASSISTANT

## 2024-09-30 PROCEDURE — 3079F DIAST BP 80-89 MM HG: CPT | Mod: CPTII,,, | Performed by: PHYSICIAN ASSISTANT

## 2024-09-30 PROCEDURE — 3077F SYST BP >= 140 MM HG: CPT | Mod: CPTII,,, | Performed by: PHYSICIAN ASSISTANT

## 2024-09-30 NOTE — ASSESSMENT & PLAN NOTE
Patient with hx of uncontrolled T1DM with last A1c 9.2%. CGM?  On pump?    Plan   - Continue Humalog with these settings  Basal   ICR  ISF  Correction  Target   Duration of action

## 2024-09-30 NOTE — ASSESSMENT & PLAN NOTE
Glucose uncontrolled with last A1c of 9.2%, however patient was unable to use Tandem Pump at the time due to not having supplies. Since he has restarted, he reports improvement in glucose. He does have hypoglycemia at times and reports it is normally when working as he is very physically active as a . I was not able to download pump data in clinic due to him not knowing his Tandem log in. It would not let me plug the pump it for download either. Will contact Tandem rep. I was able to view some data on the pump itself. Overall glucose appears to be well controlled. I did see a few episodes of hypoglycemia, but now many. Will be able to adjust further once I can download data.     Plan:   - Continue Humalog with current settings   Basal: 1 u/hr   ICR: 15   ISF: 75   Target: 110   Correction: 110   Duration of action: 5 hours

## 2024-09-30 NOTE — ASSESSMENT & PLAN NOTE
He is not on ACE/ARB. Glucose uncontrolled when UACR completed. He is having repeat BW with his PCP in 2 weeks. Will determine need for ACEi/ARB at that point.

## 2024-09-30 NOTE — PROGRESS NOTES
Subjective:      Patient ID: Anthony Hadley is a 38 y.o. male.    Chief Complaint:  T1DM    History of Present Illness  This is a 38 y.o. male. with a past medical history of Type 1 DM on insulin pump, heart disease, ADHD, Anxiety/Depression here for evaluation of T1DM.    Type 1 diabetes mellitus  Diagnosed around age 24    Current diabetes medications:  - Humalog via Tandem T-slim with Control IQ   Basal: 1 u/hr   ICR: 15   ISF: 75   Target: 110   Correction: 110   Duration of action: 5 hours     - Dexcom G7     Past diabetes medications:  - Metformin     Lab Results   Component Value Date    CREATININE 1.47 (H) 09/25/2024    EGFRNORACEVR >60 09/25/2024       Known diabetic complications: retinopathy, peripheral neuropathy, cardiovascular disease, and gastroparesis    Weight trend:  Wt Readings from Last 8 Encounters:   09/30/24 72.7 kg (160 lb 3.2 oz)   09/17/24 73.9 kg (162 lb 14.7 oz)   07/16/24 73.9 kg (163 lb)   07/01/24 73.9 kg (163 lb)   04/30/24 79.4 kg (175 lb)   04/28/24 74.8 kg (165 lb)   04/26/24 74.8 kg (165 lb)       Family history of diabetes:  Father Type 1 and Type 2; Paternal side     Prior visit with diabetes education: Yes    Current diet: 3 meals per day  Current exercise: Active at work;  work    Blood glucose monitoring at home: Tandem and Dexcom   Home blood sugar records: More controlled recently since restarting pump   Any episodes of hypoglycemia? When working or when he changes sites     Glucagon: Yes    Diabetes Management Status  Statin: Taking  ACE/ARB: Not taking    Screening or Prevention Patient's value   HgA1C Testing and Control   Lab Results   Component Value Date    HGBA1C 9.2 (H) 07/01/2024        LDL control Lab Results   Component Value Date    LDLCALC 87 01/17/2023      Nephropathy screening Lab Results   Component Value Date    MICALBCREAT 429.3 (H) 07/01/2024        Lab Results   Component Value Date    TSH 1.671 07/01/2024       Last eye exam: :  "07/10/2024      Review of Systems  As above    Social and family history reviewed  Current medications and allergies reviewed    Objective:   BP (!) 142/86   Pulse 79   Resp 18   Ht 5' 10" (1.778 m)   Wt 72.7 kg (160 lb 3.2 oz)   SpO2 100%   BMI 22.99 kg/m²   Physical Exam  Alert, oriented    BP Readings from Last 1 Encounters:   09/30/24 (!) 142/86      Wt Readings from Last 1 Encounters:   09/30/24 1048 72.7 kg (160 lb 3.2 oz)     Body mass index is 22.99 kg/m².    Lab Review:   Lab Results   Component Value Date    HGBA1C 9.2 (H) 07/01/2024     Lab Results   Component Value Date    CHOL 183 07/01/2024    HDL 49 07/01/2024    LDLCALC 87 01/17/2023    TRIG 115 07/01/2024     Lab Results   Component Value Date     07/01/2024    K 4.9 07/01/2024     07/01/2024    CO2 31 (H) 07/01/2024    BUN 31.1 (H) 07/01/2024    CREATININE 1.47 (H) 09/25/2024    CALCIUM 9.3 07/01/2024    ALBUMIN 3.1 (L) 07/01/2024    BILITOT 0.3 07/01/2024    ALKPHOS 73 07/01/2024    AST 21 07/01/2024    ALT 13 07/01/2024    ANIONGAP 8 06/09/2024    ESTGFRAFRICA 102 06/09/2024    TSH 1.671 07/01/2024       All pertinent labs reviewed    Assessment and Plan     Microalbuminuria  He is not on ACE/ARB. Glucose uncontrolled when UACR completed. He is having repeat BW with his PCP in 2 weeks. Will determine need for ACEi/ARB at that point.       Type 1 diabetes mellitus with hyperglycemia  Glucose uncontrolled with last A1c of 9.2%, however patient was unable to use Tandem Pump at the time due to not having supplies. Since he has restarted, he reports improvement in glucose. He does have hypoglycemia at times and reports it is normally when working as he is very physically active as a . I was not able to download pump data in clinic due to him not knowing his Tandem log in. It would not let me plug the pump it for download either. Will contact Tandem rep. I was able to view some data on the pump itself. Overall glucose appears " to be well controlled. I did see a few episodes of hypoglycemia, but now many. Will be able to adjust further once I can download data.     Plan:   - Continue Humalog with current settings   Basal: 1 u/hr   ICR: 15   ISF: 75   Target: 110   Correction: 110   Duration of action: 5 hours     Vitamin D deficiency  Last vitamin D was 26 on labs.    Recommend vitamin supplementation      Follow-up in 2 months     Darya Narayanan PA-C  Endocrinology

## 2024-10-01 DIAGNOSIS — E10.65 TYPE 1 DIABETES MELLITUS WITH HYPERGLYCEMIA: Primary | ICD-10-CM

## 2024-10-17 ENCOUNTER — OFFICE VISIT (OUTPATIENT)
Dept: FAMILY MEDICINE | Facility: CLINIC | Age: 39
End: 2024-10-17
Payer: MEDICAID

## 2024-10-17 DIAGNOSIS — E55.9 VITAMIN D DEFICIENCY: ICD-10-CM

## 2024-10-17 DIAGNOSIS — F90.9 ATTENTION DEFICIT HYPERACTIVITY DISORDER (ADHD), UNSPECIFIED ADHD TYPE: Primary | ICD-10-CM

## 2024-10-17 DIAGNOSIS — E10.65 TYPE 1 DIABETES MELLITUS WITH HYPERGLYCEMIA: ICD-10-CM

## 2024-10-17 PROBLEM — E10.42 TYPE 1 DIABETES MELLITUS WITH DIABETIC POLYNEUROPATHY: Status: RESOLVED | Noted: 2022-05-23 | Resolved: 2024-10-17

## 2024-10-17 PROCEDURE — 3046F HEMOGLOBIN A1C LEVEL >9.0%: CPT | Mod: CPTII,95,, | Performed by: NURSE PRACTITIONER

## 2024-10-17 PROCEDURE — 3062F POS MACROALBUMINURIA REV: CPT | Mod: CPTII,95,, | Performed by: NURSE PRACTITIONER

## 2024-10-17 PROCEDURE — 3066F NEPHROPATHY DOC TX: CPT | Mod: CPTII,95,, | Performed by: NURSE PRACTITIONER

## 2024-10-17 PROCEDURE — 1159F MED LIST DOCD IN RCRD: CPT | Mod: CPTII,95,, | Performed by: NURSE PRACTITIONER

## 2024-10-17 PROCEDURE — 1160F RVW MEDS BY RX/DR IN RCRD: CPT | Mod: CPTII,95,, | Performed by: NURSE PRACTITIONER

## 2024-10-17 PROCEDURE — 99214 OFFICE O/P EST MOD 30 MIN: CPT | Mod: 95,,, | Performed by: NURSE PRACTITIONER

## 2024-10-17 RX ORDER — DEXTROAMPHETAMINE SACCHARATE, AMPHETAMINE ASPARTATE, DEXTROAMPHETAMINE SULFATE AND AMPHETAMINE SULFATE 5; 5; 5; 5 MG/1; MG/1; MG/1; MG/1
1 TABLET ORAL 2 TIMES DAILY
Qty: 60 TABLET | Refills: 0 | Status: SHIPPED | OUTPATIENT
Start: 2024-10-17

## 2024-10-17 NOTE — PROGRESS NOTES
Patient Name: Anthony Hadley     : 1985    MRN: [id number]     Subjective:     Patient ID: Anthony Hadley is a 38 y.o. male.    Chief Complaint: No chief complaint on file.       HPI: 10/17/24:   ADHD FU.      ADHD-  Needs refill of Adderall.  He is taking 20 mg po BID. Adderall is working well.  He is compliant with .  He is compliant with drug screens.     Type 1 DM-  Pt also needs A1c ordered per his Endocrinologist.  He is doing well.     Audio/Video Telehealth Visit  The patient location is: [place]  Visit type: Virtual visit with audio and video   Provider Location:  [facility], [place], [place]  Total time spent with patient:   15 min including time spent talking to the patient about her lab results, reviewing old record, preparing chart for call, med reconciliation, and follow up care.   Each patient to whom I provide medical services by telemedicine is:  (1) informed of the relationship between the physician and patient and the respective role of any other health care provider with respect to management of the patient; and (2) notified that they may decline to receive medical services by telemedicine and may withdraw from such care at any time.   This service was not originating from a related E/M service provided within the previous 7 days nor will  to an E/M service or procedure within the next 24 hours or my soonest available appointment.  Prevailing standard of care was able to be met in this audio-only visit.           24: Review of labs 2 weeks.     Today, pt requesting referral to ENT for an issue with a nodule behind his right ear previously told was cancer and would like evaluated along with his sinuses that he has been told would need surgical intervention.     Microalbuminuria-  Review of labs revealed Microalbumin over 900.  He is not on any medication to protect his kidneys at this time. His BP is 109/74 today.  Long standing hx Type 1 DM and recently  has not been managing well.  His A1c was 9.2 on labs.      Type 1 DM-  He has been Type 1 diabetic since 2010. He is on an insulin pump.  He was told that a referral would be placed to Endocrinology here at [facility] for management of his disease process. He refused to see Endocrine citing that they all treat him like crap.  He is amenable to seeing wound care for diabetic foot care and also wants dermatology referral for scalp lesions that he has tried several OTC preparations for and nothing is working.  Pictures in Epic.      Heart disease-  He has hx heart disease but states was never told what type of heart disease.  Records from 2022 indicate a ALLAN was done and showed the following:    ALLAN 7/18/22  Technically difficult study   Normal thickness left ventricle with an ejection fraction estimated normal   around 55%   Mild mitral regurgitation   Mild tricuspid regurgitation   Pulmonary pressures mildly elevated RVSP estimated 34 mmHg   No pericardial effusion     ADHD/Anxiety/Depression-  Pt requesting referral to .  He is dyslexic as well.  He is on Adderall 20mg po BID.                     ROS:      Review of Systems   All other systems reviewed and are negative.           History:     Past Medical History:   Diagnosis Date    Anxiety     Depression     Diabetes mellitus type I     Hyperkalemia 12/02/2022    Type 1 diabetes mellitus with diabetic polyneuropathy 05/23/2022        Past Surgical History:   Procedure Laterality Date    APPENDECTOMY      CHOLECYSTECTOMY      FOOT SURGERY      bone spure and fracture       Family History   Problem Relation Name Age of Onset    Heart attack Mother      Heart disease Father      Diabetes Father      Crohn's disease Father          Social History     Tobacco Use    Smoking status: Every Day    Smokeless tobacco: Never    Tobacco comments:     Medical weed     Pt states stopped smoking cigs in years (15 years)    Substance and Sexual Activity    Alcohol use: Yes      Comment: socially    Drug use: Yes     Types: Marijuana    Sexual activity: Yes       Current Outpatient Medications   Medication Instructions    clindamycin (CLEOCIN T) 1 % Swab Topical (Top), Daily    dextroamphetamine-amphetamine (ADDERALL) 20 mg tablet 1 tablet, Oral, 2 times daily    insulin lispro protamin-lispro (HUMALOG 50/50) 100 unit/mL (50-50) Susp 100 Units, Subcutaneous, Daily    metoclopramide HCl (REGLAN) 5 mg, Oral, 4 times daily    minocycline (MINOCIN,DYNACIN) 100 mg, Oral, Daily    mupirocin (BACTROBAN) 2 % ointment Topical (Top), 3 times daily    NON FORMULARY MEDICATION Pt states uses Medical Marijuana as needed    paroxetine (PAXIL) 20 mg, Oral, Every morning    rosuvastatin (CRESTOR) 5 mg, Oral, Daily        Review of patient's allergies indicates:   Allergen Reactions    Meropenem Anaphylaxis     Cardiac arrest     Pcn [penicillins] Hives    Topamax [topiramate] Hives       Objective:     There were no vitals taken for this visit.    Physical Examination:     Physical Exam  HENT:      Right Ear: Hearing normal.      Left Ear: Hearing normal.   Neurological:      Mental Status: He is alert and oriented to person, place, and time.   Psychiatric:         Mood and Affect: Mood normal.         Lab Results:     Chemistry:  Lab Results   Component Value Date     07/01/2024    K 4.9 07/01/2024    BUN 31.1 (H) 07/01/2024    CREATININE 1.47 (H) 09/25/2024    EGFRNORACEVR >60 09/25/2024    GLUCOSE 154 (H) 07/01/2024    CALCIUM 9.3 07/01/2024    ALKPHOS 73 07/01/2024    LABPROT 7.3 07/01/2024    ALBUMIN 3.1 (L) 07/01/2024    AST 21 07/01/2024    ALT 13 07/01/2024    [id number] 26 (L) 07/01/2024    TSH 1.671 07/01/2024    JFAKBF0FABY 1.13 07/01/2024        Lab Results   Component Value Date    HGBA1C 9.2 (H) 07/01/2024        Hematology:  Lab Results   Component Value Date    WBC 9.44 07/01/2024    HGB 11.5 (L) 07/01/2024    HCT 34.3 (L) 07/01/2024     07/01/2024       Lipid Panel:  Lab  Results   Component Value Date    CHOL 183 07/01/2024    HDL 49 07/01/2024    .00 07/01/2024    TRIG 115 07/01/2024    TOTALCHOLEST 4 07/01/2024        Urine:  Lab Results   Component Value Date    APPEARANCEUA Clear 07/01/2024    SGUA 1.023 07/01/2024    PROTEINUA 2+ (A) 07/01/2024    KETONESUA Negative 07/01/2024    LEUKOCYTESUR Negative 07/01/2024    RBCUA 0-5 07/01/2024    WBCUA 0-5 07/01/2024    BACTERIA Moderate (A) 07/01/2024    SQEPUA Trace (A) 07/01/2024    HYALINECASTS None Seen 07/01/2024    CREATRANDUR 230.0 (H) 07/01/2024    PROTEINURINE 61.5 02/04/2024        Assessment:          ICD-10-CM ICD-9-CM   1. Attention deficit hyperactivity disorder (ADHD), unspecified ADHD type  F90.9 314.01   2. Vitamin D deficiency  E55.9 268.9   3. Type 1 diabetes mellitus with hyperglycemia  E10.65 250.01        Plan:     1. Attention deficit hyperactivity disorder (ADHD), unspecified ADHD type  Assessment & Plan:  Will schedule virtual appointment in 3 months for ADHD refills  Rx sent today for October refills.     Orders:  -     dextroamphetamine-amphetamine (ADDERALL) 20 mg tablet; Take 1 tablet by mouth 2 (two) times a day.  Dispense: 60 tablet; Refill: 0    2. Vitamin D deficiency  Assessment & Plan:  Repeat Vitamin D.    Orders:  -     Vitamin D; Future; Expected date: 10/17/2024    3. Type 1 diabetes mellitus with hyperglycemia  Assessment & Plan:  Established with Endocrine. Needs A1c done. Orders placed today.     Orders:  -     Hemoglobin A1C; Future; Expected date: 10/17/2024         Follow up in about 2 months (around 12/17/2024) for BP check; Repeat Microalbumin; Foot Exam-face to face only for BP recheck.    Future Appointments   Date Time Provider Department Center   10/30/2024  2:00 PM RESIDENT 1, [facility] OTORHINOLARYNGOLOGY [facility] ENT Daryl Quintana   11/6/2024  8:00 AM Kelli Amezquita MD [facility]   11/15/2024 12:00 PM EYE RETINA, [facility] OPTH FRANCH[facility]KA Mejia   11/20/2024  9:30 AM  Priscilla Woo FNP [facility] [place]   12/2/2024 10:00 AM Darya Narayanan PA-C [facility]        PUNEET Lo

## 2024-10-17 NOTE — ASSESSMENT & PLAN NOTE
Will schedule virtual appointment in 3 months for ADHD refills  Rx sent today for October refills.

## 2024-10-22 ENCOUNTER — PATIENT MESSAGE (OUTPATIENT)
Dept: ENDOCRINOLOGY | Facility: CLINIC | Age: 39
End: 2024-10-22
Payer: MEDICAID

## 2024-10-28 ENCOUNTER — CLINICAL SUPPORT (OUTPATIENT)
Dept: FAMILY MEDICINE | Facility: CLINIC | Age: 39
End: 2024-10-28
Payer: MEDICAID

## 2024-10-28 DIAGNOSIS — E10.65 TYPE 1 DIABETES MELLITUS WITH HYPERGLYCEMIA: ICD-10-CM

## 2024-10-28 DIAGNOSIS — E55.9 VITAMIN D DEFICIENCY: ICD-10-CM

## 2024-10-28 DIAGNOSIS — E55.9 VITAMIN D DEFICIENCY: Primary | ICD-10-CM

## 2024-10-28 LAB
25(OH)D3+25(OH)D2 SERPL-MCNC: 17 NG/ML (ref 30–80)
EST. AVERAGE GLUCOSE BLD GHB EST-MCNC: 180 MG/DL
HBA1C MFR BLD: 7.9 %

## 2024-10-28 PROCEDURE — 82306 VITAMIN D 25 HYDROXY: CPT

## 2024-10-28 PROCEDURE — 83036 HEMOGLOBIN GLYCOSYLATED A1C: CPT

## 2024-10-28 PROCEDURE — 36415 COLL VENOUS BLD VENIPUNCTURE: CPT

## 2024-10-28 RX ORDER — ASPIRIN 325 MG
50000 TABLET, DELAYED RELEASE (ENTERIC COATED) ORAL
Qty: 12 CAPSULE | Refills: 0 | Status: SHIPPED | OUTPATIENT
Start: 2024-10-28

## 2024-10-30 ENCOUNTER — OFFICE VISIT (OUTPATIENT)
Dept: OTOLARYNGOLOGY | Facility: CLINIC | Age: 39
End: 2024-10-30
Payer: MEDICAID

## 2024-10-30 VITALS
RESPIRATION RATE: 20 BRPM | DIASTOLIC BLOOD PRESSURE: 78 MMHG | OXYGEN SATURATION: 99 % | SYSTOLIC BLOOD PRESSURE: 123 MMHG | BODY MASS INDEX: 22.9 KG/M2 | HEART RATE: 102 BPM | TEMPERATURE: 98 F | WEIGHT: 160 LBS | HEIGHT: 70 IN

## 2024-10-30 DIAGNOSIS — H93.11 TINNITUS OF RIGHT EAR: ICD-10-CM

## 2024-10-30 DIAGNOSIS — J32.4 CHRONIC PANSINUSITIS: Primary | ICD-10-CM

## 2024-10-30 DIAGNOSIS — H69.90 ETD (EUSTACHIAN TUBE DYSFUNCTION), UNSPECIFIED LATERALITY: ICD-10-CM

## 2024-10-30 PROCEDURE — 99215 OFFICE O/P EST HI 40 MIN: CPT | Mod: PBBFAC | Performed by: STUDENT IN AN ORGANIZED HEALTH CARE EDUCATION/TRAINING PROGRAM

## 2024-10-30 RX ORDER — CLINDAMYCIN PHOSPHATE 900 MG/50ML
900 INJECTION, SOLUTION INTRAVENOUS
OUTPATIENT
Start: 2024-10-30

## 2024-10-30 RX ORDER — SODIUM CHLORIDE 9 MG/ML
INJECTION, SOLUTION INTRAVENOUS CONTINUOUS
OUTPATIENT
Start: 2024-10-30

## 2024-10-30 RX ORDER — IPRATROPIUM BROMIDE 21 UG/1
2 SPRAY, METERED NASAL 3 TIMES DAILY
Qty: 30 ML | Refills: 0 | Status: SHIPPED | OUTPATIENT
Start: 2024-10-30

## 2024-11-08 DIAGNOSIS — Z00.00 ROUTINE ADULT HEALTH MAINTENANCE: Primary | ICD-10-CM

## 2024-11-11 ENCOUNTER — TELEPHONE (OUTPATIENT)
Dept: FAMILY MEDICINE | Facility: CLINIC | Age: 39
End: 2024-11-11
Payer: MEDICAID

## 2024-11-11 NOTE — TELEPHONE ENCOUNTER
----- Message from Sultana sent at 11/11/2024  9:16 AM CST -----  .Who Called: Alex pharmacist     Pharmacy is calling to request assistance with Rx    Pharmacy name and phone number: [facility] [facility] [place]   Pharmacy contact: Alex   Patient Name: susana coyne   Prescription Name:  dextroamphetamine-amphetamine (ADDERALL) 20 mg tablet  What do they need to clarify?:needs clarification on the directions and filling early         Preferred Method of Contact: Phone Call  Patient's Preferred Phone Number on File: [phone]  Best Call Back Number, if different:  Additional Information:

## 2024-11-15 ENCOUNTER — OFFICE VISIT (OUTPATIENT)
Dept: OPHTHALMOLOGY | Facility: CLINIC | Age: 39
End: 2024-11-15
Payer: MEDICAID

## 2024-11-15 VITALS — HEIGHT: 70 IN | BODY MASS INDEX: 22.91 KG/M2 | WEIGHT: 160.06 LBS

## 2024-11-15 DIAGNOSIS — E10.65 TYPE 1 DIABETES MELLITUS WITH HYPERGLYCEMIA: ICD-10-CM

## 2024-11-15 DIAGNOSIS — E08.3313 MODERATE NONPROLIFERATIVE DIABETIC RETINOPATHY OF BOTH EYES WITH MACULAR EDEMA ASSOCIATED WITH DIABETES MELLITUS DUE TO UNDERLYING CONDITION: Primary | ICD-10-CM

## 2024-11-15 PROCEDURE — 99213 OFFICE O/P EST LOW 20 MIN: CPT | Mod: PBBFAC,PN | Performed by: STUDENT IN AN ORGANIZED HEALTH CARE EDUCATION/TRAINING PROGRAM

## 2024-11-15 NOTE — PROGRESS NOTES
HPI    Referred for dilated diabetic exam  Was seen in [place] for diabetic retinopathy within the last year   Last edited by Lexis Velez MA on 11/15/2024 12:40 PM.        OCT Macula 11/15/24  OD: 275, preserved foveal contour, HE throughout, trace IRF worse superiorly   OS: 256, preserved foveal contour, HE, trace IRF cysts       Assessment /Plan     For exam results, see Encounter Report.    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 edema associated with diabetes mellitus due to underlying condition    Type 1 diabetes mellitus with hyperglycemia  -     Ambulatory referral/consult to Ophthalmology      1) Moderate NPDR with Trace Macular Edema, OU  - new patient to us, previously followed by ophtho in [place]   - states that his last ophthalmologist recommended injections; patient refuses injections   - last A1c 7.9%  - VA good today with minimal macular edema   - Will observe patient at this time    RTC 3 months for OCT Macula and DFE

## 2024-11-18 PROBLEM — E08.3313 MODERATE NONPROLIFERATIVE DIABETIC RETINOPATHY OF BOTH EYES WITH MACULAR EDEMA ASSOCIATED WITH DIABETES MELLITUS DUE TO UNDERLYING CONDITION: Status: ACTIVE | Noted: 2024-11-18

## 2024-11-19 ENCOUNTER — HOSPITAL ENCOUNTER (OUTPATIENT)
Dept: WOUND CARE | Facility: HOSPITAL | Age: 39
Discharge: HOME | End: 2024-11-19
Attending: NURSE PRACTITIONER
Payer: MEDICAID

## 2024-11-19 VITALS
DIASTOLIC BLOOD PRESSURE: 72 MMHG | SYSTOLIC BLOOD PRESSURE: 156 MMHG | OXYGEN SATURATION: 99 % | TEMPERATURE: 98 F | RESPIRATION RATE: 18 BRPM | HEART RATE: 96 BPM

## 2024-11-19 DIAGNOSIS — L60.2 OVERGROWN TOENAILS: ICD-10-CM

## 2024-11-19 DIAGNOSIS — Z79.899 MEDICAL MARIJUANA USE: ICD-10-CM

## 2024-11-19 DIAGNOSIS — E11.9 ENCOUNTER FOR DIABETIC FOOT EXAM: Primary | ICD-10-CM

## 2024-11-19 DIAGNOSIS — E10.65 TYPE 1 DIABETES MELLITUS WITH HYPERGLYCEMIA: ICD-10-CM

## 2024-11-19 DIAGNOSIS — Z72.0 TOBACCO USE: ICD-10-CM

## 2024-11-19 PROCEDURE — 27000999 HC MEDICAL RECORD PHOTO DOCUMENTATION

## 2024-11-19 PROCEDURE — 99213 OFFICE O/P EST LOW 20 MIN: CPT | Mod: 25,,, | Performed by: NURSE PRACTITIONER

## 2024-11-19 PROCEDURE — 99211 OFF/OP EST MAY X REQ PHY/QHP: CPT

## 2024-11-19 PROCEDURE — 11719 TRIM NAIL(S) ANY NUMBER: CPT

## 2024-11-19 PROCEDURE — 11056 PARNG/CUTG B9 HYPRKR LES 2-4: CPT

## 2024-11-20 NOTE — PROGRESS NOTES
[facility]   Outpatient Wound Care     Subjective:   Patient ID: Anthony Hadley is a 38 y.o. male.    Chief Complaint: Diabetes (DFC)      History of Present Illness:   38 y.o. White male presents to wound care clinic today for diabetic foot care.  Presents to clinic alone.  Ambulates without difficulty.  Followed by Nasrin Arambula FNP for PCP last appointment 10/17/2024.     Today's visit 11/19/24:  Diabetic foot exam performed.  Monofilament test done decreased sensation noted in all areas.  Bilateral lower extremities cool to touch with absent hair growth.  Trimmed all 10 toenails using podiatry clippers, and filed with [email or web address] board.  No calluses today's visit.  Instructed the importance of checking feet daily due to decrease sensation high risk for diabetic foot ulcers.  Instructed on proper footwear, nail care, and daily skin assessment.  Will have him return to the clinic in 3 months.  Instructed to call the office with any questions, concerns, or new skin issues.  Verbalized understanding of all instructions.       08/13/2024:  Diabetic foot exam performed at bedside.  Monofilament test done sensation noted in all areas.  Trimmed all 10 toenails using podiatry clippers, and filed with [email or web address] board.  Pared right great toe callus using # dermal curette.  Rationale for paring is to decrease pressure and alleviate pain to area.  Discussion with the patient today regarding proper footwear.  Instructed on nail care, the importance of checking feet daily due to high risk for diabetic foot ulcers, and medication compliance.  Reinforced diabetic education during exam patient has insulin pump was alarming patient blood glucose on machine 250's.  Informed the patient the importance of following diet, exercise, and taking all diabetic medications as directed.  Last A1c  was 9.2 patient voices he was out of his medications but has them now.  Will have him return to the clinic in 3 months for diabetic foot care.  Instructed to call the office with any questions, concerns, or new skin issues.  Verbalized understanding of all instructions.      History includes:      Past Medical History:   Diagnosis Date    Anxiety     Depression     Diabetes mellitus type I     Hyperkalemia 12/02/2022    Type 1 diabetes mellitus with diabetic polyneuropathy 05/23/2022      Past Surgical History:   Procedure Laterality Date    APPENDECTOMY      CHOLECYSTECTOMY      FOOT SURGERY      bone spure and fracture      Social History     Socioeconomic History    Marital status:    Tobacco Use    Smoking status: Former     Current packs/day: 0.00     Types: Cigarettes     Quit date: 02/2009     Years since quitting: 15.8    Smokeless tobacco: Never    Tobacco comments:     Medical weed     Pt states stopped smoking cigs in years (15 years)    Substance and Sexual Activity    Alcohol use: Yes     Comment: socially    Drug use: Yes     Types: Marijuana    Sexual activity: Yes     Social Drivers of Health     Financial Resource Strain: Patient Declined (9/19/2024)    Overall Financial Resource Strain (CARDIA)     Difficulty of Paying Living Expenses: Patient declined   Food Insecurity: Patient Declined (9/19/2024)    Hunger Vital Sign     Worried About Running Out of Food in the Last Year: Patient declined     Ran Out of Food in the Last Year: Patient declined   Physical Activity: Sufficiently Active (9/19/2024)    Exercise Vital Sign     Days of Exercise per Week: 6 days     Minutes of Exercise per Session: 60 min   Stress: Stress Concern Present (9/19/2024)    [employer] - Occupational Stress Questionnaire     Feeling of Stress : To some extent   Housing Stability: Unknown (9/19/2024)    Housing Stability Vital Sign     Unable to Pay for Housing in the Last Year: Patient  declined   .      Current Outpatient Medications   Medication Sig Dispense Refill    cholecalciferol, vitamin D3, 1,250 mcg (50,000 unit) capsule Take 1 capsule (50,000 Units total) by mouth every 7 days. 12 capsule 0    clindamycin (CLEOCIN T) 1 % Swab Apply topically once daily. 60 each 2    dextroamphetamine-amphetamine (ADDERALL) 20 mg tablet Take 1 tablet by mouth 2 (two) times a day. 60 tablet 0    insulin lispro protamin-lispro (HUMALOG 50/50) 100 unit/mL (50-50) Susp Inject 100 Units into the skin once daily. 10 mL 3    ipratropium (ATROVENT) 21 mcg (0.03 %) nasal spray 2 sprays by Each Nostril route 3 (three) times daily. 30 mL 0    metoclopramide HCl (REGLAN) 5 MG tablet Take 1 tablet (5 mg total) by mouth 4 (four) times daily. 120 tablet 3    minocycline (MINOCIN,DYNACIN) 100 MG capsule Take 1 capsule (100 mg total) by mouth once daily. 90 capsule 0    NON FORMULARY MEDICATION Pt states uses Medical Marijuana as needed      paroxetine (PAXIL) 20 MG tablet Take 1 tablet (20 mg total) by mouth every morning. 30 tablet 3    rosuvastatin (CRESTOR) 5 MG tablet Take 1 tablet (5 mg total) by mouth once daily. 90 tablet 3    mupirocin (BACTROBAN) 2 % ointment Apply topically 3 (three) times daily. (Patient not taking: Reported on 11/19/2024) 22 g 0     No current facility-administered medications for this encounter.       Review of Systems   All other systems reviewed and are negative.         Labs Reviewed:   Chemistry:  Lab Results   Component Value Date    BUN 31.1 (H) 07/01/2024    BUN 28 (H) 06/09/2024    CREATININE 1.47 (H) 09/25/2024    CREATININE 1.13 07/01/2024    EGFRNORACEVR >60 09/25/2024    EGFRNORACEVR >60 07/01/2024    GLUCOSE 154 (H) 07/01/2024    AST 21 07/01/2024    ALT 13 07/01/2024    HGBA1C 7.9 (H) 10/28/2024        Hematology:  Lab Results   Component Value Date    WBC 9.44 07/01/2024    HGB 11.5 (L) 07/01/2024    HGB 11.2 (L) 02/04/2024    HCT 34.3 (L) 07/01/2024    HCT 34.1 (L) 02/04/2024  "    07/01/2024       Inflammatory Markers:  No results found for: "HSCRP", "SEDRATE"     Objective:        Physical Exam  Vitals reviewed.   Cardiovascular:      Pulses:           Dorsalis pedis pulses are 2+ on the right side and 2+ on the left side.        Posterior tibial pulses are 2+ on the right side and 2+ on the left side.   Feet:      Right foot:      Toenail Condition: Right toenails are long.      Left foot:      Toenail Condition: Left toenails are long.   Skin:     General: Skin is cool and dry.      Capillary Refill: Capillary refill takes less than 2 seconds.   Neurological:      Mental Status: He is alert.                    Assessment:         ICD-10-CM ICD-9-CM   1. Encounter for diabetic foot exam  E11.9 250.00   2. Overgrown toenails  L60.2 703.8   3. Type 1 diabetes mellitus with hyperglycemia  E10.65 250.01   4. Tobacco use  Z72.0 305.1   5. Medical marijuana use  Z79.899 V58.69         Plan:   Tissue pathology and/or culture taken:  [] Yes [x] No   Sharp debridement performed:   [] Yes [x] No   Labs ordered this visit:   [] Yes [x] No   Imaging ordered this visit:   [] Yes [x] No         1. Encounter for diabetic foot exam     Diabetic foot exam performed.  Instructed on proper foot care.   2. Overgrown toenails     Trimmed.  Instructed on proper nail care.   3. Type 1 diabetes mellitus with hyperglycemia     Last A1c 7.9.    Must have a strict diabetic diet, take glucose lowering medications as prescribed and encourage lower HA1C.   5. Tobacco use     Reinforced cessation.    Must stop smoking. Explained the negative impact this has on not only the wounds (delayed healing), but overall health as well.     6. Medical marijuana use     Uses for anxiety.       The time spent including preparing to see the patient, obtaining patient history and assessment, evaluation of the plan of care, patient/caregiver counseling and education, orders, documentation, coordination of care, and other " professional medical management activities for today's encounter was 20 minute.    Time spent performing procedures during today's encounter was 20 minute.    Follow up in about 3 months (around 2/19/2025). Teaching provided on s/s to call wound clinic for promptly.  ER precautions taught for after hours and weekends.         PUNEET Ayala

## 2024-12-02 ENCOUNTER — OFFICE VISIT (OUTPATIENT)
Dept: ENDOCRINOLOGY | Facility: CLINIC | Age: 39
End: 2024-12-02
Payer: MEDICAID

## 2024-12-02 ENCOUNTER — PATIENT MESSAGE (OUTPATIENT)
Dept: ENDOCRINOLOGY | Facility: CLINIC | Age: 39
End: 2024-12-02

## 2024-12-02 VITALS
WEIGHT: 166 LBS | SYSTOLIC BLOOD PRESSURE: 166 MMHG | HEART RATE: 92 BPM | OXYGEN SATURATION: 99 % | DIASTOLIC BLOOD PRESSURE: 89 MMHG | RESPIRATION RATE: 18 BRPM | BODY MASS INDEX: 23.24 KG/M2 | HEIGHT: 71 IN

## 2024-12-02 DIAGNOSIS — E10.65 TYPE 1 DIABETES MELLITUS WITH HYPERGLYCEMIA: Primary | ICD-10-CM

## 2024-12-02 DIAGNOSIS — E55.9 VITAMIN D DEFICIENCY: ICD-10-CM

## 2024-12-02 DIAGNOSIS — R80.9 MICROALBUMINURIA: ICD-10-CM

## 2024-12-02 PROCEDURE — 3079F DIAST BP 80-89 MM HG: CPT | Mod: CPTII,,, | Performed by: PHYSICIAN ASSISTANT

## 2024-12-02 PROCEDURE — 3008F BODY MASS INDEX DOCD: CPT | Mod: CPTII,,, | Performed by: PHYSICIAN ASSISTANT

## 2024-12-02 PROCEDURE — 99213 OFFICE O/P EST LOW 20 MIN: CPT | Mod: PBBFAC | Performed by: PHYSICIAN ASSISTANT

## 2024-12-02 PROCEDURE — 3066F NEPHROPATHY DOC TX: CPT | Mod: CPTII,,, | Performed by: PHYSICIAN ASSISTANT

## 2024-12-02 PROCEDURE — 99214 OFFICE O/P EST MOD 30 MIN: CPT | Mod: S$PBB,,, | Performed by: PHYSICIAN ASSISTANT

## 2024-12-02 PROCEDURE — 3062F POS MACROALBUMINURIA REV: CPT | Mod: CPTII,,, | Performed by: PHYSICIAN ASSISTANT

## 2024-12-02 PROCEDURE — 99999 PR PBB SHADOW E&M-EST. PATIENT-LVL III: CPT | Mod: PBBFAC,,, | Performed by: PHYSICIAN ASSISTANT

## 2024-12-02 PROCEDURE — 3077F SYST BP >= 140 MM HG: CPT | Mod: CPTII,,, | Performed by: PHYSICIAN ASSISTANT

## 2024-12-02 PROCEDURE — 1159F MED LIST DOCD IN RCRD: CPT | Mod: CPTII,,, | Performed by: PHYSICIAN ASSISTANT

## 2024-12-02 PROCEDURE — 3051F HG A1C>EQUAL 7.0%<8.0%: CPT | Mod: CPTII,,, | Performed by: PHYSICIAN ASSISTANT

## 2024-12-02 RX ORDER — LISINOPRIL 5 MG/1
5 TABLET ORAL DAILY
Qty: 90 TABLET | Refills: 3 | Status: SHIPPED | OUTPATIENT
Start: 2024-12-02 | End: 2025-12-02

## 2024-12-02 NOTE — ASSESSMENT & PLAN NOTE
Glucose control has improved with last A1c of 7.9% since restarting Tandem pump.    This is the second visit in which I was not able to download pump data in clinic due to him not knowing his Tandem log in. It would not let me plug the pump it for download either. I contacted the tandem rep and they reached out to patient multiple times, but he did not answer. I have also messaged him on [employer] multiple times about contacting [employer] to get pump connected. He states he got in contact with someone with [employer] and has a new log in, but cannot get it connected. He will reach out to them again.      Plan:   - Continue Humalog with current settings   Basal: 1 u/hr   ICR: 15   ISF: 75   Target: 110   Correction: 110   Duration of action: 5 hours

## 2024-12-02 NOTE — PROGRESS NOTES
Subjective:      Patient ID: Anthony Hadley is a 38 y.o. male.    Chief Complaint:  T1DM    History of Present Illness  This is a 38 y.o. male. with a past medical history of Type 1 DM on insulin pump, heart disease, ADHD, Anxiety/Depression here for evaluation of T1DM.    Type 1 diabetes mellitus  Diagnosed around age 24    Current diabetes medications:  - Humalog via Tandem T-slim with Control IQ   Basal: 1 u/hr   ICR: 15   ISF: 75   Target: 110   Correction: 110   Duration of action: 5 hours     - Dexcom G7     Past diabetes medications:  - Metformin     Lab Results   Component Value Date    CREATININE 1.47 (H) 09/25/2024    EGFRNORACEVR >60 09/25/2024     Known diabetic complications: retinopathy, peripheral neuropathy, cardiovascular disease, and gastroparesis    Weight trend:  Wt Readings from Last 8 Encounters:   12/02/24 75.3 kg (166 lb)   11/15/24 72.6 kg (160 lb 0.9 oz)   10/30/24 72.6 kg (160 lb)   09/30/24 72.7 kg (160 lb 3.2 oz)   09/17/24 73.9 kg (162 lb 14.7 oz)   07/16/24 73.9 kg (163 lb)   07/01/24 73.9 kg (163 lb)   04/30/24 79.4 kg (175 lb)       Family history of diabetes:  Father Type 1 and Type 2; Paternal side     Prior visit with diabetes education: Yes    Current diet: 3 meals per day  Current exercise: Active at work;  work    Blood glucose monitoring at home: Tandem and Dexcom   Home blood sugar records: More controlled recently since restarting pump   Any episodes of hypoglycemia? When working or when he changes sites     Glucagon: Yes    Diabetes Management Status  Statin: Taking  ACE/ARB: Not taking    Screening or Prevention Patient's value   HgA1C Testing and Control   Lab Results   Component Value Date    HGBA1C 7.9 (H) 10/28/2024        LDL control Lab Results   Component Value Date    LDLCALC 87 01/17/2023      Nephropathy screening Lab Results   Component Value Date    MICALBCREAT 429.3 (H) 07/01/2024        Lab Results   Component Value Date    TSH 1.671 07/01/2024       Last  "eye exam: : 11/15/2024      Review of Systems  As above    Social and family history reviewed  Current medications and allergies reviewed    Objective:   BP (!) 166/89   Pulse 92   Resp 18   Ht 5' 11" (1.803 m)   Wt 75.3 kg (166 lb)   SpO2 99%   BMI 23.15 kg/m²   Physical Exam  Alert, oriented    BP Readings from Last 1 Encounters:   12/02/24 (!) 166/89      Wt Readings from Last 1 Encounters:   12/02/24 1021 75.3 kg (166 lb)     Body mass index is 23.15 kg/m².    Lab Review:   Lab Results   Component Value Date    HGBA1C 7.9 (H) 10/28/2024     Lab Results   Component Value Date    CHOL 183 07/01/2024    HDL 49 07/01/2024    LDLCALC 87 01/17/2023    TRIG 115 07/01/2024     Lab Results   Component Value Date     07/01/2024    K 4.9 07/01/2024     07/01/2024    CO2 31 (H) 07/01/2024    BUN 31.1 (H) 07/01/2024    CREATININE 1.47 (H) 09/25/2024    CALCIUM 9.3 07/01/2024    ALBUMIN 3.1 (L) 07/01/2024    BILITOT 0.3 07/01/2024    ALKPHOS 73 07/01/2024    AST 21 07/01/2024    ALT 13 07/01/2024    ANIONGAP 8 06/09/2024    ESTGFRAFRICA 102 06/09/2024    TSH 1.671 07/01/2024       All pertinent labs reviewed    Assessment and Plan     Type 1 diabetes mellitus with hyperglycemia  Glucose control has improved with last A1c of 7.9% since restarting Tandem pump.    This is the second visit in which I was not able to download pump data in clinic due to him not knowing his Tandem log in. It would not let me plug the pump it for download either. I contacted the tandem rep and they reached out to patient multiple times, but he did not answer. I have also messaged him on [employer] multiple times about contacting [employer] to get pump connected. He states he got in contact with someone with [employer] and has a new log in, but cannot get it connected. He will reach out to them again.      Plan:   - Continue Humalog with current settings   Basal: 1 u/hr   ICR: 15   ISF: 75   Target: 110   Correction: 110   Duration of action: 5 " hours     Microalbuminuria  He is not on ACE/ARB. Start Lisinopril     Vitamin D deficiency  Vitamin D low. He was started on D3 50,000 IU. Managed by PCP    Follow-up 2 months     Darya Narayanan PA-C  Endocrinology

## 2024-12-03 ENCOUNTER — ANESTHESIA EVENT (OUTPATIENT)
Dept: SURGERY | Facility: HOSPITAL | Age: 39
End: 2024-12-03
Payer: MEDICAID

## 2024-12-03 NOTE — ANESTHESIA PREPROCEDURE EVALUATION
Anthony Hadley is a 38 y.o. male PRESENTING FOR ANTERIOR FESS, TURBINATE REDUCTION (Bilateral: Face) with a history of   -CHRONIC PANSINUSITIS   -FORMER SMOKER  -MARIJUANA USER  -ANXIETY/DEPRESSION/ADHD  -T1DM (A1C 7.9% 10/28/24) ON INSULIN PUMP; AM CBG     -H/O DIABETIC GASTROPARESIS     -NEUROPATHY  -HLD    BETA-BLOCKER: NONE    New Orders for Anesthesia: DM PROTOCOL     Patient Active Problem List   Diagnosis    Attention deficit hyperactivity disorder    Diabetic gastroparesis    Gout    Neuropathy    Microalbuminuria    Vitamin D deficiency    Tinnitus, right ear    Encounter for diabetic foot exam    Overgrown toenails    Callus of foot    Type 1 diabetes mellitus with hyperglycemia    Tobacco use    Medical marijuana use    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 edema associated with diabetes mellitus due to underlying condition       Past Surgical History:   Procedure Laterality Date    APPENDECTOMY      CHOLECYSTECTOMY      FOOT SURGERY      bone spure and fracture       Lab Results   Component Value Date    WBC 9.44 07/01/2024    HGB 11.5 (L) 07/01/2024    HCT 34.3 (L) 07/01/2024     07/01/2024       CMP  Sodium   Date Value Ref Range Status   07/01/2024 140 136 - 145 mmol/L Final   06/09/2024 139 136 - 145 mmol/L Final     Potassium   Date Value Ref Range Status   07/01/2024 4.9 3.5 - 5.1 mmol/L Final   06/09/2024 4.3 3.5 - 5.1 mmol/L Final     Chloride   Date Value Ref Range Status   07/01/2024 104 98 - 107 mmol/L Final   06/09/2024 106 100 - 109 mmol/L Final     CO2   Date Value Ref Range Status   07/01/2024 31 (H) 22 - 29 mmol/L Final     Carbon Dioxide   Date Value Ref Range Status   06/09/2024 25 22 - 33 mmol/L Final     Blood Urea Nitrogen   Date Value Ref Range Status   07/01/2024 31.1 (H) 8.9 - 20.6 mg/dL Final   06/09/2024 28 (H) 5 - 25 mg/dL Final     Creatinine   Date Value Ref Range Status   09/25/2024 1.47 (H) 0.73 - 1.18 mg/dL Final    06/09/2024 0.97 0.73 - 1.18 mg/dL Final     Calcium   Date Value Ref Range Status   07/01/2024 9.3 8.4 - 10.2 mg/dL Final   06/09/2024 8.7 (L) 8.8 - 10.6 mg/dL Final     Albumin   Date Value Ref Range Status   07/01/2024 3.1 (L) 3.5 - 5.0 g/dL Final     Albumin Level   Date Value Ref Range Status   02/05/2024 3.0 (L) 3.5 - 5.0 g/dl Final     Bilirubin Total   Date Value Ref Range Status   07/01/2024 0.3 <=1.5 mg/dL Final     ALP   Date Value Ref Range Status   07/01/2024 73 40 - 150 unit/L Final     AST   Date Value Ref Range Status   07/01/2024 21 5 - 34 unit/L Final     ALT   Date Value Ref Range Status   07/01/2024 13 0 - 55 unit/L Final     Anion Gap   Date Value Ref Range Status   06/09/2024 8 8 - 16 mmol/L Final     eGFR   Date Value Ref Range Status   09/25/2024 >60 mL/min/1.73/m2 Final       Lab Results   Component Value Date    INR 1.0 01/17/2023             NUCLEAR STRESS 7/17/22        Current Outpatient Medications   Medication Instructions    cholecalciferol (vitamin D3) 50,000 Units, Oral, Every 7 days    clindamycin (CLEOCIN T) 1 % Swab Topical (Top), Daily    dextroamphetamine-amphetamine (ADDERALL) 20 mg tablet 1 tablet, Oral, 2 times daily    insulin lispro protamin-lispro (HUMALOG 50/50) 100 unit/mL (50-50) Susp 100 Units, Subcutaneous, Daily    ipratropium (ATROVENT) 21 mcg (0.03 %) nasal spray 2 sprays, Each Nostril, 3 times daily    lisinopriL (PRINIVIL,ZESTRIL) 5 mg, Oral, Daily    metoclopramide HCl (REGLAN) 5 mg, Oral, 4 times daily    minocycline (MINOCIN,DYNACIN) 100 mg, Oral, Daily    mupirocin (BACTROBAN) 2 % ointment Topical (Top), 3 times daily    NON FORMULARY MEDICATION Pt states uses Medical Marijuana as needed    paroxetine (PAXIL) 20 mg, Oral, Every morning    rosuvastatin (CRESTOR) 5 mg, Oral, Daily       Past anesthesia records:       Pre-op Assessment    I have reviewed the Patient Summary Reports.     I have reviewed the Nursing Notes. I have reviewed the NPO  Status.   I have reviewed the Medications.     Review of Systems  Anesthesia Hx:  No problems with previous Anesthesia   History of prior surgery of interest to airway management or planning:          Denies Family Hx of Anesthesia complications.    Denies Personal Hx of Anesthesia complications.                    Hematology/Oncology:  Hematology Normal   Oncology Normal                                   EENT/Dental:  EENT/Dental Normal           Cardiovascular:  Cardiovascular Normal                                              Pulmonary:  Pulmonary Normal                       Renal/:  Renal/ Normal                 Hepatic/GI:  Hepatic/GI Normal                    Musculoskeletal:  Musculoskeletal Normal                Neurological:  Neurology Normal                                      Endocrine:  Endocrine Normal            Dermatological:  Skin Normal    Psych:  Psychiatric Normal                  Physical Exam  General: Well nourished, Cooperative, Alert and Oriented    Airway:  Mallampati: I / I  Mouth Opening: Normal  TM Distance: Normal  Tongue: Normal  Neck ROM: Normal ROM    Dental:  Intact    Anesthesia Plan  Type of Anesthesia, risks & benefits discussed:    Anesthesia Type: Gen ETT  Intra-op Monitoring Plan: Standard ASA Monitors  Post Op Pain Control Plan: multimodal analgesia and IV/PO Opioids PRN  Induction:  rapid sequence  Airway Plan: Direct  Informed Consent: Informed consent signed with the Patient and all parties understand the risks and agree with anesthesia plan.  All questions answered. Patient consented to blood products? No  ASA Score: 3  Day of Surgery Review of History & Physical: H&P Update referred to the surgeon/provider.H&P completed by Anesthesiologist.    Ready For Surgery From Anesthesia Perspective.   .

## 2024-12-06 NOTE — PROGRESS NOTES
Patient was instructed not to take lisinopril the morning of surgery.  Pt is on an insulin pump and was instructed to have it on a basal rate.

## 2024-12-10 ENCOUNTER — TELEPHONE (OUTPATIENT)
Dept: FAMILY MEDICINE | Facility: CLINIC | Age: 39
End: 2024-12-10
Payer: MEDICAID

## 2024-12-10 NOTE — TELEPHONE ENCOUNTER
----- Message from PUNEET Lofton sent at 12/10/2024 11:35 AM CST -----    ----- Message -----  From: Haley Santos  Sent: 12/9/2024   2:45 PM CST  To: PUNEET Lo    Who Called: Anthony Hadley    Patient is returning phone call    Who Left Message for Patient:   Does the patient know what this is regarding?:       Preferred Method of Contact: Phone Call  Patient's Preferred Phone Number on File: [phone]   Best Call Back Number, if different:  Additional Information:  returning call

## 2024-12-11 ENCOUNTER — TELEPHONE (OUTPATIENT)
Dept: ENDOCRINOLOGY | Facility: CLINIC | Age: 39
End: 2024-12-11
Payer: MEDICAID

## 2024-12-11 ENCOUNTER — PATIENT MESSAGE (OUTPATIENT)
Dept: ENDOCRINOLOGY | Facility: CLINIC | Age: 39
End: 2024-12-11
Payer: MEDICAID

## 2024-12-11 NOTE — TELEPHONE ENCOUNTER
Paper work faxed to [facility].  Confirmation received.  Sent to scan.

## 2024-12-12 ENCOUNTER — PATIENT MESSAGE (OUTPATIENT)
Dept: SURGERY | Facility: HOSPITAL | Age: 39
End: 2024-12-12
Payer: MEDICAID

## 2024-12-13 ENCOUNTER — ANESTHESIA (OUTPATIENT)
Dept: SURGERY | Facility: HOSPITAL | Age: 39
End: 2024-12-13
Payer: MEDICAID

## 2024-12-13 ENCOUNTER — HOSPITAL ENCOUNTER (OUTPATIENT)
Facility: HOSPITAL | Age: 39
Discharge: HOME | End: 2024-12-13
Attending: OTOLARYNGOLOGY | Admitting: OTOLARYNGOLOGY
Payer: MEDICAID

## 2024-12-13 DIAGNOSIS — J32.4 CHRONIC PANSINUSITIS: ICD-10-CM

## 2024-12-13 DIAGNOSIS — J34.2 NASAL SEPTAL DEVIATION: Primary | ICD-10-CM

## 2024-12-13 DIAGNOSIS — J34.3 NASAL TURBINATE HYPERTROPHY: ICD-10-CM

## 2024-12-13 DIAGNOSIS — J34.89 NASAL OBSTRUCTION: ICD-10-CM

## 2024-12-13 LAB
POCT GLUCOSE: 119 MG/DL (ref 70–110)
POCT GLUCOSE: 133 MG/DL (ref 70–110)
POCT GLUCOSE: 135 MG/DL (ref 70–110)
POCT GLUCOSE: 176 MG/DL (ref 70–110)
POCT GLUCOSE: 182 MG/DL (ref 70–110)
POCT GLUCOSE: 200 MG/DL (ref 70–110)

## 2024-12-13 PROCEDURE — 63600175 PHARM REV CODE 636 W HCPCS: Performed by: NURSE PRACTITIONER

## 2024-12-13 PROCEDURE — 88304 TISSUE EXAM BY PATHOLOGIST: CPT | Mod: TC | Performed by: OTOLARYNGOLOGY

## 2024-12-13 PROCEDURE — 37000008 HC ANESTHESIA 1ST 15 MINUTES: Performed by: OTOLARYNGOLOGY

## 2024-12-13 PROCEDURE — 36000708 HC OR TIME LEV III 1ST 15 MIN: Performed by: OTOLARYNGOLOGY

## 2024-12-13 PROCEDURE — 82962 GLUCOSE BLOOD TEST: CPT | Performed by: OTOLARYNGOLOGY

## 2024-12-13 PROCEDURE — 71000016 HC POSTOP RECOV ADDL HR: Performed by: OTOLARYNGOLOGY

## 2024-12-13 PROCEDURE — 63600175 PHARM REV CODE 636 W HCPCS: Mod: JZ,JG | Performed by: STUDENT IN AN ORGANIZED HEALTH CARE EDUCATION/TRAINING PROGRAM

## 2024-12-13 PROCEDURE — 71000033 HC RECOVERY, INTIAL HOUR: Performed by: OTOLARYNGOLOGY

## 2024-12-13 PROCEDURE — 63600175 PHARM REV CODE 636 W HCPCS: Performed by: OTOLARYNGOLOGY

## 2024-12-13 PROCEDURE — 63600175 PHARM REV CODE 636 W HCPCS: Performed by: SPECIALIST

## 2024-12-13 PROCEDURE — 25000003 PHARM REV CODE 250

## 2024-12-13 PROCEDURE — 36000709 HC OR TIME LEV III EA ADD 15 MIN: Performed by: OTOLARYNGOLOGY

## 2024-12-13 PROCEDURE — 37000009 HC ANESTHESIA EA ADD 15 MINS: Performed by: OTOLARYNGOLOGY

## 2024-12-13 PROCEDURE — 63600175 PHARM REV CODE 636 W HCPCS: Mod: JZ,JG

## 2024-12-13 PROCEDURE — 27201423 OPTIME MED/SURG SUP & DEVICES STERILE SUPPLY: Performed by: OTOLARYNGOLOGY

## 2024-12-13 PROCEDURE — 25000003 PHARM REV CODE 250: Performed by: SPECIALIST

## 2024-12-13 PROCEDURE — 71000015 HC POSTOP RECOV 1ST HR: Performed by: OTOLARYNGOLOGY

## 2024-12-13 RX ORDER — KETAMINE HCL IN 0.9 % NACL 50 MG/5 ML
SYRINGE (ML) INTRAVENOUS
Status: DISCONTINUED | OUTPATIENT
Start: 2024-12-13 | End: 2024-12-13

## 2024-12-13 RX ORDER — ROPIVACAINE HYDROCHLORIDE 5 MG/ML
INJECTION, SOLUTION EPIDURAL; INFILTRATION; PERINEURAL
Status: DISCONTINUED | OUTPATIENT
Start: 2024-12-13 | End: 2024-12-13 | Stop reason: HOSPADM

## 2024-12-13 RX ORDER — PROCHLORPERAZINE EDISYLATE 5 MG/ML
5 INJECTION INTRAMUSCULAR; INTRAVENOUS ONCE AS NEEDED
Status: COMPLETED | OUTPATIENT
Start: 2024-12-13 | End: 2024-12-13

## 2024-12-13 RX ORDER — HYDROMORPHONE HYDROCHLORIDE 1 MG/ML
0.5 INJECTION, SOLUTION INTRAMUSCULAR; INTRAVENOUS; SUBCUTANEOUS EVERY 5 MIN PRN
Status: DISCONTINUED | OUTPATIENT
Start: 2024-12-13 | End: 2024-12-13 | Stop reason: HOSPADM

## 2024-12-13 RX ORDER — OXYCODONE AND ACETAMINOPHEN 5; 325 MG/1; MG/1
2 TABLET ORAL ONCE
Status: COMPLETED | OUTPATIENT
Start: 2024-12-13 | End: 2024-12-13

## 2024-12-13 RX ORDER — ROCURONIUM BROMIDE 10 MG/ML
INJECTION, SOLUTION INTRAVENOUS
Status: DISCONTINUED | OUTPATIENT
Start: 2024-12-13 | End: 2024-12-13

## 2024-12-13 RX ORDER — DEXMEDETOMIDINE HYDROCHLORIDE 100 UG/ML
INJECTION, SOLUTION INTRAVENOUS
Status: DISCONTINUED | OUTPATIENT
Start: 2024-12-13 | End: 2024-12-13

## 2024-12-13 RX ORDER — PROPOFOL 10 MG/ML
VIAL (ML) INTRAVENOUS
Status: DISCONTINUED | OUTPATIENT
Start: 2024-12-13 | End: 2024-12-13

## 2024-12-13 RX ORDER — DEXAMETHASONE SODIUM PHOSPHATE 4 MG/ML
INJECTION, SOLUTION INTRA-ARTICULAR; INTRALESIONAL; INTRAMUSCULAR; INTRAVENOUS; SOFT TISSUE
Status: DISCONTINUED | OUTPATIENT
Start: 2024-12-13 | End: 2024-12-13

## 2024-12-13 RX ORDER — GLUCAGON 1 MG
1 KIT INJECTION
Status: DISCONTINUED | OUTPATIENT
Start: 2024-12-13 | End: 2024-12-13 | Stop reason: HOSPADM

## 2024-12-13 RX ORDER — LIDOCAINE HYDROCHLORIDE 20 MG/ML
INJECTION, SOLUTION EPIDURAL; INFILTRATION; INTRACAUDAL; PERINEURAL
Status: DISCONTINUED | OUTPATIENT
Start: 2024-12-13 | End: 2024-12-13

## 2024-12-13 RX ORDER — LIDOCAINE HYDROCHLORIDE AND EPINEPHRINE 10; 10 UG/ML; MG/ML
INJECTION, SOLUTION INFILTRATION; PERINEURAL
Status: DISCONTINUED | OUTPATIENT
Start: 2024-12-13 | End: 2024-12-13 | Stop reason: HOSPADM

## 2024-12-13 RX ORDER — DOXYCYCLINE 100 MG/1
100 CAPSULE ORAL 2 TIMES DAILY
Qty: 14 CAPSULE | Refills: 0 | Status: SHIPPED | OUTPATIENT
Start: 2024-12-13 | End: 2024-12-20

## 2024-12-13 RX ORDER — EPINEPHRINE 1 MG/ML
INJECTION INTRAMUSCULAR; INTRAVENOUS; SUBCUTANEOUS
Status: DISCONTINUED | OUTPATIENT
Start: 2024-12-13 | End: 2024-12-13 | Stop reason: HOSPADM

## 2024-12-13 RX ORDER — ONDANSETRON HYDROCHLORIDE 2 MG/ML
INJECTION, SOLUTION INTRAMUSCULAR; INTRAVENOUS
Status: DISCONTINUED | OUTPATIENT
Start: 2024-12-13 | End: 2024-12-13

## 2024-12-13 RX ORDER — VITAMIN A 3000 MCG
1 CAPSULE ORAL
Qty: 30 ML | Refills: 12 | Status: SHIPPED | OUTPATIENT
Start: 2024-12-13 | End: 2024-12-27

## 2024-12-13 RX ORDER — PHENYLEPHRINE HYDROCHLORIDE 10 MG/ML
INJECTION INTRAVENOUS
Status: DISCONTINUED | OUTPATIENT
Start: 2024-12-13 | End: 2024-12-13

## 2024-12-13 RX ORDER — VASOPRESSIN 20 [USP'U]/ML
INJECTION, SOLUTION INTRAMUSCULAR; SUBCUTANEOUS
Status: DISCONTINUED | OUTPATIENT
Start: 2024-12-13 | End: 2024-12-13

## 2024-12-13 RX ORDER — DIPHENHYDRAMINE HYDROCHLORIDE 50 MG/ML
25 INJECTION INTRAMUSCULAR; INTRAVENOUS ONCE AS NEEDED
Status: DISCONTINUED | OUTPATIENT
Start: 2024-12-13 | End: 2024-12-13 | Stop reason: HOSPADM

## 2024-12-13 RX ORDER — SODIUM CHLORIDE, SODIUM LACTATE, POTASSIUM CHLORIDE, CALCIUM CHLORIDE 600; 310; 30; 20 MG/100ML; MG/100ML; MG/100ML; MG/100ML
INJECTION, SOLUTION INTRAVENOUS CONTINUOUS
Status: DISCONTINUED | OUTPATIENT
Start: 2024-12-13 | End: 2024-12-13 | Stop reason: HOSPADM

## 2024-12-13 RX ORDER — CLINDAMYCIN PHOSPHATE 900 MG/50ML
900 INJECTION, SOLUTION INTRAVENOUS
Status: COMPLETED | OUTPATIENT
Start: 2024-12-13 | End: 2024-12-13

## 2024-12-13 RX ORDER — HYDROMORPHONE HYDROCHLORIDE 1 MG/ML
0.2 INJECTION, SOLUTION INTRAMUSCULAR; INTRAVENOUS; SUBCUTANEOUS EVERY 5 MIN PRN
Status: DISCONTINUED | OUTPATIENT
Start: 2024-12-13 | End: 2024-12-13 | Stop reason: HOSPADM

## 2024-12-13 RX ORDER — HYDROCODONE BITARTRATE AND ACETAMINOPHEN 7.5; 325 MG/1; MG/1
1 TABLET ORAL EVERY 6 HOURS PRN
Qty: 15 TABLET | Refills: 0 | Status: SHIPPED | OUTPATIENT
Start: 2024-12-13 | End: 2024-12-17 | Stop reason: SDUPTHER

## 2024-12-13 RX ORDER — INSULIN ASPART 100 [IU]/ML
0-5 INJECTION, SOLUTION INTRAVENOUS; SUBCUTANEOUS ONCE AS NEEDED
Status: COMPLETED | OUTPATIENT
Start: 2024-12-13 | End: 2024-12-13

## 2024-12-13 RX ORDER — SODIUM CITRATE AND CITRIC ACID MONOHYDRATE 334; 500 MG/5ML; MG/5ML
30 SOLUTION ORAL ONCE
Status: COMPLETED | OUTPATIENT
Start: 2024-12-13 | End: 2024-12-13

## 2024-12-13 RX ORDER — SODIUM CHLORIDE 9 MG/ML
INJECTION, SOLUTION INTRAVENOUS CONTINUOUS
Status: DISCONTINUED | OUTPATIENT
Start: 2024-12-13 | End: 2024-12-13 | Stop reason: HOSPADM

## 2024-12-13 RX ORDER — MIDAZOLAM HYDROCHLORIDE 2 MG/2ML
2 INJECTION, SOLUTION INTRAMUSCULAR; INTRAVENOUS ONCE
Status: COMPLETED | OUTPATIENT
Start: 2024-12-13 | End: 2024-12-13

## 2024-12-13 RX ORDER — MEPERIDINE HYDROCHLORIDE 25 MG/ML
12.5 INJECTION INTRAMUSCULAR; INTRAVENOUS; SUBCUTANEOUS ONCE
Status: DISCONTINUED | OUTPATIENT
Start: 2024-12-13 | End: 2024-12-13 | Stop reason: HOSPADM

## 2024-12-13 RX ORDER — ONDANSETRON HYDROCHLORIDE 2 MG/ML
4 INJECTION, SOLUTION INTRAVENOUS ONCE
Status: DISCONTINUED | OUTPATIENT
Start: 2024-12-13 | End: 2024-12-13 | Stop reason: HOSPADM

## 2024-12-13 RX ORDER — FAMOTIDINE 10 MG/ML
20 INJECTION INTRAVENOUS 2 TIMES DAILY
Status: DISCONTINUED | OUTPATIENT
Start: 2024-12-13 | End: 2024-12-13 | Stop reason: HOSPADM

## 2024-12-13 RX ORDER — FENTANYL CITRATE 50 UG/ML
INJECTION, SOLUTION INTRAMUSCULAR; INTRAVENOUS
Status: DISCONTINUED | OUTPATIENT
Start: 2024-12-13 | End: 2024-12-13

## 2024-12-13 RX ORDER — IPRATROPIUM BROMIDE AND ALBUTEROL SULFATE 2.5; .5 MG/3ML; MG/3ML
3 SOLUTION RESPIRATORY (INHALATION) ONCE AS NEEDED
Status: DISCONTINUED | OUTPATIENT
Start: 2024-12-13 | End: 2024-12-13 | Stop reason: HOSPADM

## 2024-12-13 RX ADMIN — ROCURONIUM BROMIDE 50 MG: 10 INJECTION INTRAVENOUS at 07:12

## 2024-12-13 RX ADMIN — PHENYLEPHRINE HYDROCHLORIDE 100 MCG: 10 INJECTION INTRAVENOUS at 08:12

## 2024-12-13 RX ADMIN — Medication 10 MG: at 09:12

## 2024-12-13 RX ADMIN — FAMOTIDINE 20 MG: 10 INJECTION, SOLUTION INTRAVENOUS at 05:12

## 2024-12-13 RX ADMIN — CLINDAMYCIN PHOSPHATE 900 MG: 900 INJECTION, SOLUTION INTRAVENOUS at 07:12

## 2024-12-13 RX ADMIN — ONDANSETRON 4 MG: 2 INJECTION INTRAMUSCULAR; INTRAVENOUS at 12:12

## 2024-12-13 RX ADMIN — SUGAMMADEX 200 MG: 100 INJECTION, SOLUTION INTRAVENOUS at 12:12

## 2024-12-13 RX ADMIN — PHENYLEPHRINE HYDROCHLORIDE 100 MCG: 10 INJECTION INTRAVENOUS at 10:12

## 2024-12-13 RX ADMIN — VASOPRESSIN 1 UNITS: 20 INJECTION INTRAVENOUS at 10:12

## 2024-12-13 RX ADMIN — PHENYLEPHRINE HYDROCHLORIDE 100 MCG: 10 INJECTION INTRAVENOUS at 11:12

## 2024-12-13 RX ADMIN — PROCHLORPERAZINE EDISYLATE 5 MG: 5 INJECTION INTRAMUSCULAR; INTRAVENOUS at 12:12

## 2024-12-13 RX ADMIN — ONDANSETRON 4 MG: 2 INJECTION INTRAMUSCULAR; INTRAVENOUS at 11:12

## 2024-12-13 RX ADMIN — DEXMEDETOMIDINE 10 MCG: 200 INJECTION, SOLUTION INTRAVENOUS at 07:12

## 2024-12-13 RX ADMIN — FENTANYL CITRATE 50 MCG: 50 INJECTION, SOLUTION INTRAMUSCULAR; INTRAVENOUS at 07:12

## 2024-12-13 RX ADMIN — DEXAMETHASONE SODIUM PHOSPHATE 4 MG: 4 INJECTION, SOLUTION INTRA-ARTICULAR; INTRALESIONAL; INTRAMUSCULAR; INTRAVENOUS; SOFT TISSUE at 07:12

## 2024-12-13 RX ADMIN — Medication 20 MG: at 07:12

## 2024-12-13 RX ADMIN — OXYCODONE HYDROCHLORIDE AND ACETAMINOPHEN 2 TABLET: 5; 325 TABLET ORAL at 02:12

## 2024-12-13 RX ADMIN — SODIUM CITRATE AND CITRIC ACID MONOHYDRATE 30 ML: 500; 334 SOLUTION ORAL at 07:12

## 2024-12-13 RX ADMIN — Medication 10 MG: at 10:12

## 2024-12-13 RX ADMIN — DEXMEDETOMIDINE 10 MCG: 200 INJECTION, SOLUTION INTRAVENOUS at 08:12

## 2024-12-13 RX ADMIN — MIDAZOLAM HYDROCHLORIDE 2 MG: 1 INJECTION, SOLUTION INTRAMUSCULAR; INTRAVENOUS at 06:12

## 2024-12-13 RX ADMIN — VASOPRESSIN 1 UNITS: 20 INJECTION INTRAVENOUS at 08:12

## 2024-12-13 RX ADMIN — PHENYLEPHRINE HYDROCHLORIDE 100 MCG: 10 INJECTION INTRAVENOUS at 09:12

## 2024-12-13 RX ADMIN — LIDOCAINE HYDROCHLORIDE 50 MG: 20 INJECTION, SOLUTION EPIDURAL; INFILTRATION; INTRACAUDAL; PERINEURAL at 07:12

## 2024-12-13 RX ADMIN — HYDROMORPHONE HYDROCHLORIDE 0.5 MG: 1 INJECTION, SOLUTION INTRAMUSCULAR; INTRAVENOUS; SUBCUTANEOUS at 12:12

## 2024-12-13 RX ADMIN — PROPOFOL 20 MG: 10 INJECTION, EMULSION INTRAVENOUS at 07:12

## 2024-12-13 RX ADMIN — Medication 10 MG: at 08:12

## 2024-12-13 RX ADMIN — INSULIN ASPART 4 UNITS: 100 INJECTION, SOLUTION INTRAVENOUS; SUBCUTANEOUS at 10:12

## 2024-12-13 NOTE — H&P
[facility]  Otolaryngology Clinic Note    Anthony Hadley  Encounter Date: 10/30/2024  YOB: 1985  Physician: MD Riley    Chief Complaint: hearing, sinus    HPI: 38 y.o. male referred for tinnitus, sinus concerns, and lesion behind right ear? States about a year ago, he began having otalgia and was told by an ED provider that he had cancer based off an MRI finding. States he has bumps behind his right ear which fluctuate in size. He was seen by an ENT in [place] who he states through his MRI disk in the garbage and told him it was just a swollen lymph node. States that doctor also told him he needed sinus surgery. Endorses nasal congestion R>L, rhinitis, facial pressure periorbitally/frontally, & epiphora. He does not take abx frequently for sinus infections but states he occasionally has to take IV abx for recurrent MRSA skin infections. Endorses fluctuant HL and tinnitus to his right ear which resolves briefly with auto-insufflation. Denies otorrhea. States he had recurrent ear infections when he was young but never had PE tubes or otologic surgery, that they got better as he grew up. He uses astelin prn when his nasal symptoms are severe.       9/17/24:  Here for follow-up.  Has been on nasal sprays for the past month without improvement in his symptoms.  Patient reports over the past couple of years he has had at least 20 sinus infections requiring antibiotics.  Patient also has trouble breathing when he is laying down.  Patient has never had sinus surgery.  Never been allergy tested.  Has tried nasal irrigations however he gets nasal vestibulitis very easily and avoids it  Patient also presents for evaluation of a right postauricular lymphadenopathy.  He reports it swells up when he gets sick.  Currently it is not swollen.  He also reports right-sided tinnitus.  Reports occasional hearing loss.  Had an audiogram today which does not show hearing loss.     10/30/24:  Still  complains of right-sided ear pain.  MRI within normal limits.  Still complaining of constant nasal drainage worsened with eating food.  Also complains of nasal congestion.  No relief with nasal sprays.    12/13/24: Pt presents today for surgery. No changes to HPI    ROS:   General: Negative except per HPI  Skin: Denies rash, ulcer, or lesion.  Eyes: Denies vision changes or diplopia.  Ears: Negative except per HPI  Nose: Negative except per HPI  Throat/mouth: Negative except per HPI  Cardiovascular: Negative except per HPI  Respiratory: Negative except per HPI  Neck: Negative except per HPI  Endocrine: Negative except per HPI  Neurologic: Negative except per HPI    Other 10-point review of systems negative except per HPI      Review of patient's allergies indicates:   Allergen Reactions    Meropenem Anaphylaxis     Cardiac arrest     Pcn [penicillins] Hives    Topamax [topiramate] Hives       Past Medical History:   Diagnosis Date    Anxiety     Depression     Diabetes mellitus type I     Hyperkalemia 12/02/2022    Type 1 diabetes mellitus with diabetic polyneuropathy 05/23/2022       Past Surgical History:   Procedure Laterality Date    APPENDECTOMY      CHOLECYSTECTOMY      FOOT SURGERY      bone spure and fracture       Social History     Socioeconomic History    Marital status:    Tobacco Use    Smoking status: Former     Current packs/day: 0.00     Types: Cigarettes     Quit date: 02/2009     Years since quitting: 15.8    Smokeless tobacco: Never    Tobacco comments:     Medical weed     Pt states stopped smoking cigs in years (15 years)    Substance and Sexual Activity    Alcohol use: Not Currently     Comment: socially    Drug use: Yes     Types: Marijuana     Comment: medically prescribed to prevent nausea    Sexual activity: Yes     Social Drivers of Health     Financial Resource Strain: Patient Declined (9/19/2024)    Overall Financial Resource Strain (CARDIA)     Difficulty of Paying Living  Expenses: Patient declined   Food Insecurity: Patient Declined (9/19/2024)    Hunger Vital Sign     Worried About Running Out of Food in the Last Year: Patient declined     Ran Out of Food in the Last Year: Patient declined   Physical Activity: Sufficiently Active (9/19/2024)    Exercise Vital Sign     Days of Exercise per Week: 6 days     Minutes of Exercise per Session: 60 min   Stress: Stress Concern Present (9/19/2024)    [employer] [place] [employer] - Occupational Stress Questionnaire     Feeling of Stress : To some extent   Housing Stability: Unknown (9/19/2024)    Housing Stability Vital Sign     Unable to Pay for Housing in the Last Year: Patient declined       Family History   Problem Relation Name Age of Onset    Heart attack Mother      Heart disease Father      Diabetes Father      Crohn's disease Father         Outpatient Encounter Medications as of 10/30/2024   Medication Sig Dispense Refill    cholecalciferol, vitamin D3, 1,250 mcg (50,000 unit) capsule Take 1 capsule (50,000 Units total) by mouth every 7 days. 12 capsule 0    clindamycin (CLEOCIN T) 1 % Swab Apply topically once daily. 60 each 2    dextroamphetamine-amphetamine (ADDERALL) 20 mg tablet Take 1 tablet by mouth 2 (two) times a day. 60 tablet 0    insulin lispro protamin-lispro (HUMALOG 50/50) 100 unit/mL (50-50) Susp Inject 100 Units into the skin once daily. 10 mL 3    metoclopramide HCl (REGLAN) 5 MG tablet Take 1 tablet (5 mg total) by mouth 4 (four) times daily. 120 tablet 3    minocycline (MINOCIN,DYNACIN) 100 MG capsule Take 1 capsule (100 mg total) by mouth once daily. 90 capsule 0    mupirocin (BACTROBAN) 2 % ointment Apply topically 3 (three) times daily. 22 g 0    NON FORMULARY MEDICATION Pt states uses Medical Marijuana as needed      paroxetine (PAXIL) 20 MG tablet Take 1 tablet (20 mg total) by mouth every morning. 30 tablet 3    rosuvastatin (CRESTOR) 5 MG tablet Take 1 tablet (5 mg total) by mouth once daily.  90 tablet 3    [DISCONTINUED] dextroamphetamine-amphetamine (ADDERALL) 20 mg tablet Take 1 tablet by mouth 2 (two) times a day. 60 tablet 0    [DISCONTINUED] dextroamphetamine-amphetamine (ADDERALL) 20 mg tablet Take 1 tablet by mouth once daily. 60 tablet 0     No facility-administered encounter medications on file as of 10/30/2024.       Physical Exam:  Vitals:    12/13/24 0456 12/13/24 0508 12/13/24 0510   BP:  138/86 138/86   Pulse:  74    Resp:  16    Temp:  97.7 °F (36.5 °C)    TempSrc:  Oral    SpO2:  100%    Weight: 72.7 kg (160 lb 3.2 oz)         Constitutional  General Appearance: well nourished, well-developed, AAO x3, NAD  HEENT:  Tenderness along maxillary sinus  Eyes: PEERLA, EOMI, normal conjunctivae  Ears: Hearing well at conversation level   AD: auricle normal, EAC normal, TM intact, no FRANCHESCA   AS: auricle normal, EAC normal, TM intact, no FRANCHESCA  Nose: septum midline, moderate inferior turbinate hypertrophy, no polyps, moist mucosa without erythema or blue hue  OC/OP: dentition moderate, no oral lesions, tongue/FOM/BOT- soft, no leukoplakia/ulcerations/ tenderness  Nasopharynx, Hypopharynx, and Larynx:    Indirect: attempted, limited view due to patient intolerance  Neck: soft, non-tender, no palpable lymph nodes   Thyroid region- no nodules or goiter  Neuro: CN II - XII intact bilaterally  Cardiovascular: peripheral pulses palpable  Respiratory: non-labored respirations  Psychiatric: oriented to time, place and person, no depression, anxiety or agitation        Pertinent Data:  ? LABS:  ? AUDIO:     Muna Joshi AU.D   Audiologist  Specialty: Audiology     Progress Notes     Signed     Encounter Date: 9/17/2024                                                                                                                   Audiological Evaluation     Patient History:     Patient evaluated today to assess hearing.  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  A history of middle ear involvement/otologic procedures have been denied at this time.  Patient's medical history has reportedly not changed since most recent medical evaluation.         Pure Tone Testing:     Right ear:       Mild, mixed HL     Left ear:          Essentially normal hearing sensitivity w/ the exception of a mild decrease at 3000 Hz           Tympanometry:       Right ear:       Type 'A' tympanogram     Left ear:          Type 'A' tympanogram                     Acoustic Reflex Testing     Right ear:       Did not test         Left ear:          Did not test            DPOAE Testing     Right ear:       Present emissions for the frequencies 1500, 3000 and 4000 Hz     Left ear:Present emissions for the frequencies 0195-3656 Hz     Interpretations:     Pure tone testing revealed a mild, mixed hearing loss for the right ear.  Testing for the left ear revealed essentially normal hearing sensitivity with the exception of a mild decrease predominantly at 3000 Hz. Speech reception thresholds were obtained at 30 dB HL (right ear) and 20 dB HL (left ear) consistent with pure tone results, bilaterally.  Word recognition scores were excellent, bilaterally.  Immittance testing revealed Type A tympanograms, bilaterally, indicative of normal middle ear function. DPOAEs were present for the test frequencies noted above, bilaterally..  Otoscopy revealed clear EACs, bilaterally.       Recommendations:      Audiological testing annually and/or per ENT  ENT evaluation (9/17/2024)  Hearing protection when exposed to hazardous noise     Bryon Edwards.  Clinical Audiologist              Electronically signed by Muna Joshi AU.D at 9/17/2024  9:29 AM       ? CT Scan:  Bilateral maxillary sinus disease     MRI:   No CPA lesions.     Imaging:   I personally reviewed the following images:    Summary of Outside  Records:      Assessment/Plan: 38 y.o. male with flunctuant HL and tinnitus, AR, likely ETD, ?CRS, subjective right posterior auricular lesions.  Postauricular lesion seems consistent with lymphadenopathy that is reactive.      Patient with recurrent sinus infections on multiple rounds of antibiotics, symptoms not controlled Flonase and Astelin.  CT with anterior sinus disease.     - plan for anterior FESS with inferior turbinate reduction on December 13th.    Maria Esther Joyner MD  [facility] Department of Otolaryngology  [facility]

## 2024-12-13 NOTE — PLAN OF CARE
Patient has Dexcom on left upper arm in place. Insulin pump infusing Lispro 1 Unit/hour basal dose.

## 2024-12-13 NOTE — ANESTHESIA PROCEDURE NOTES
Intubation    Date/Time: 12/13/2024 7:09 AM    Performed by: Sarah Bryant  Authorized by: Pauline Mccray MD    Intubation:     Induction:  Intravenous    Intubated:  Postinduction    Mask Ventilation:  Easy mask    Attempts:  1    Attempted By:  Student and CRNA (neftali banda)    Method of Intubation:  Direct    Blade:  Emir 4    Laryngeal View Grade: Grade I - full view of cords      Difficult Airway Encountered?: No      Complications:  None    Airway Device:  Oral endotracheal tube    Airway Device Size:  7.5    Style/Cuff Inflation:  Cuffed    Inflation Amount (mL):  6    Tube secured:  23    Secured at:  The lips    Placement Verified By:  Capnometry and Revisualization with laryngoscopy    Complicating Factors:  None    Findings Post-Intubation:  BS equal bilateral and atraumatic/condition of teeth unchanged

## 2024-12-13 NOTE — BRIEF OP NOTE
[facility] - Periop Services  Brief Operative Note    Surgery Date: 12/13/2024     Surgeons and Role:     * Yohan Cartwright MD - Primary    Resident Surgeon(s):   Maria Esther Joyner MD, HO-III  YEN Pettit-V     Pre-operative diagnosis:  1. Chronic sinusitis  2. Chronic rhinitis  3. Inferior turbinate hypertrophy  4. Deviated septum                      Post-operative diagnosis:   1. Chronic sinusitis  2. Chronic rhinitis  3. Inferior turbinate hypertrophy  4. Deviated septum                      Procedure(s) Performed:   1. Left posterior ethmoidectomy with removal of contents  2. Bilateral anterior ethmoidectomy with removal of contents  3. Bilateral maxillary antrostomy with removal of contents  4. Septoplasty  5. Inferior turbinate submucosal resection  6. Outfracture of inferior turbinates    Anesthesia: General    Operative Findings:  Bilateral maxillary sinus disease, inferior turbinate hypertrophy, septal deviation with large rightward septal spur     Estimated Blood Loss: 35cc         Specimens:   Specimen (24h ago, onward)      None              Discharge Note    OUTCOME: Patient tolerated treatment/procedure well without complication and is now ready for discharge.    DISPOSITION: Home or Self Care    FINAL DIAGNOSIS:  As above    FOLLOWUP: ENT Clinic as scheduled    DISCHARGE INSTRUCTIONS:  Please refer to dictated patient instructions in AVS

## 2024-12-13 NOTE — TRANSFER OF CARE
Anesthesia Transfer of Care Note    Patient: Anthony Hadley    Procedure(s) Performed: Procedure(s) (LRB):  ANTERIOR FESS, TURBINATE REDUCTION (Bilateral)  SEPTOPLASTY, NOSE    Patient location: PACU    Anesthesia Type: general    Transport from OR: Transported from OR on room air with adequate spontaneous ventilation    Post pain: adequate analgesia    Post assessment: no apparent anesthetic complications and tolerated procedure well    Post vital signs: stable    Level of consciousness: awake, alert, oriented and agitated    Nausea/Vomiting: nausea and vomiting    Complications: none    Transfer of care protocol was followed      Last vitals: Visit Vitals  /78   Pulse 72   Temp 36.3 °C (97.3 °F) (Temporal)   Resp 20   Wt 72.7 kg (160 lb 3.2 oz)   SpO2 100%   BMI 22.34 kg/m²

## 2024-12-13 NOTE — ANESTHESIA POSTPROCEDURE EVALUATION
Anesthesia Post Evaluation    Patient: Anthony Hadley    Procedure(s) Performed: Procedure(s) (LRB):  ANTERIOR FESS, TURBINATE REDUCTION (Bilateral)  SEPTOPLASTY, NOSE    Final Anesthesia Type: general      Patient location during evaluation: PACU  Patient participation: Yes- Able to Participate  Level of consciousness: awake and responds to stimulation  Post-procedure vital signs: reviewed and stable  Pain management: adequate  Airway patency: patent    PONV status at discharge: No PONV  Anesthetic complications: no      Cardiovascular status: blood pressure returned to baseline  Respiratory status: unassisted  Hydration status: euvolemic  Follow-up not needed.          Vitals Value Taken Time   /76 12/13/24 1530   Temp 36.6 °C (97.9 °F) 12/13/24 1530   Pulse 90 12/13/24 1530   Resp 20 12/13/24 1530   SpO2 98 % 12/13/24 1530         Event Time   Out of Recovery 13:11:00         Pain/Jaimie Score: Pain Rating Prior to Med Admin: 6 (12/13/2024  2:06 PM)  Pain Rating Post Med Admin: 4 (12/13/2024  2:30 PM)  Jaimie Score: 8 (12/13/2024  1:15 PM)  Modified Jaimie Score: 18 (12/13/2024  3:30 PM)

## 2024-12-13 NOTE — OP NOTE
[facility] Otolaryngology Operative Report:    Patient Name: Anthony Hadley  Date of Admission: 12/13/2024  YOB: 1985  Date of procedure: 12/13/2024    Attending Surgeon: Yohan Cartwright MD    Resident Surgeon(s):   Maria Esther Joyner MD, HO-III  YEN Pettit-V    Pre-operative diagnosis:  1. Chronic sinusitis  2. Chronic rhinitis  3. Inferior turbinate hypertrophy  4. Deviated septum      Post-operative diagnosis:   1. Chronic sinusitis  2. Chronic rhinitis  3. Inferior turbinate hypertrophy  4. Deviated septum      Procedure(s) Performed:   1. Left total ethmoidectomy with removal of contents  2. Right anterior ethmoidectomy with removal of contents  3. Bilateral maxillary antrostomy with removal of contents  4. Septoplasty  5. Bilateral inferior turbinate submucosal resection  6. Outfracture of inferior turbinates    Anesthesia: General    Blood Loss: 35cc    Implants: Cain splints    Drains: None    Specimens:   ID Type Source Tests Collected by Time Destination   A : left side nasal contents Tissue Nose, Left SPECIMEN TO PATHOLOGY Yohan Cartwright MD 12/13/2024 0901    B : right side nasal contents Tissue Nose, Right SPECIMEN TO PATHOLOGY Yohan Cartwright MD 12/13/2024 0951          Complications: None    Findings: Bilateral maxillary sinus disease, inferior turbinate hypertrophy, septal deviation with large rightward septal spur    Indications for Surgery:   Anthony Hadley is a 38 y.o. patient with a long-standing history of chronic rhinosinusitis, inferior turbinate hypertrophy, and septal deviation with refractory symptoms. He has maximized medical therapy to the best of his ability without any significant improvement. We have reviewed the treatment options of observation, continued medical therapy, versus surgery in great detail. We have extensively discussed the risks, benefits and possible complications of endoscopic surgery and informed consent was obtained.  After careful consideration, he is ready  for us to proceed today.     Procedure in Detail:   After appropriate witnessed informed consent was obtained, the patient was taken down to the operating room and laid in the supine position. General endotracheal anesthesia then commenced without complications.    The bed was then rotated 90 degrees and the patient was appropriately positioned for surgery. The nasal cavity was prepared with epinephrine and ropivacaine soaked cottonoid pledgets. The patient was then prepped and draped out in the usual fashion. Before proceeding any further, a full timeout was performed identifying the correct patient and the operative site.     Next, the 0-degree endoscope was used to visualize the left nasal cavity. The inferior turbinate was outfractured to provide from improved access to the middle meatus and ethmoid cavity. The uncinate was then taken down with the backbiter and the microdebrider. The maxillary sinus was then entered. The uncinate was completely removed along the horizontal and the vertical portion. After the sinus was opened up widely, tissue was removed and the cavity was copiously irrigated. An angled endoscope was used to confirm the connection to the natural maxillary sinus ostium, which was widened circumferentially.     The left ethmoids were addressed next. The ethmoid bulla was first addressed with Kerrison and through cutting instrumentation, and the remainder of the anterior ethmoid partitions were carefully removed with a combination of sharp instrumentation and microdebrider. The basal lamella was then entered low and medial, entering the posterior ethmoid compartment.  All anterior and posterior ethmoid air cells were completely removed. The anterior and posterior ethmoid cells were meticulously taken off the skull base as well as the lamina papyracea.     The nasal septum and inferior turbinates were injected with local anesthesia.   A right sided tadeo-transfixion incision was made with a 15  blade. Sub-perichondrial and sub-periosteal flaps were elevated off the cartilaginous and bony septum bilaterally. The cartilaginous septal cuts were made, taking care to leave a caudal and dorsal strut. The bony-cartilaginous junction was postage stamped and the deviated cartilage was removed. Deviated ethmoid bone and maxillary crest bony was then removed sharply. The nose was then irrigated with sterile saline. The tadeo-transfixion incision was closed with interrupted 5-0 chromic suture. The septum was re-secured to the maxillary crest with a matressed 3-0 vicryl rapide. The mucoperichondrial flaps were reapproximated with the same 3-0 vicryl rapide suture in a mattress fashion.      Next, the 0-degree endoscope was used to visualize the right nasal cavity. The inferior turbinate was outfractured to provide from improved access to the middle meatus and ethmoid cavity.  The uncinate was then taken down with the backbiter and the microdebrider. The maxillary sinus was then entered. The uncinate was completely removed along the horizontal and the vertical portion. After the sinus was opened up widely, tissue was removed and the cavity was copiously irrigated. An angled endoscope was used to confirm the connection to the natural maxillary sinus ostium, which was widened circumferentially.     The right ethmoids were addressed next. The ethmoid bulla was first addressed with Kerrison and through cutting instrumentation, and the remainder of the anterior ethmoid partitions were carefully removed with a combination of sharp instrumentation and microdebrider. All anterior ethmoid air cells were completely removed.     The bilateral inferior turbinates were then addressed by submucous resection.  A small incision was made into the head and sharp dissection was carried posteriorly along the bone using Iris scissors. Next, bilaterally the inferior turbinates were outfractured using a Bowie elevator. This created widely  patent airways bilaterally.      Next, the nasal cavities were irrigated and hemostasis was achieved. Absorbable sponge was placed within the middle meatus bilaterally. Cain nasal septal splints were placed and secured with a 3-0 prolene suture. The eyes were irrigated with BSS and the patient was then suctioned with an orogastric tube. The patient was then turned back to the anesthesia team in stable condition. All counts were correct at the conclusion of the operation. He tolerated the procedure very well and without complication.     Maria Esther Joyner MD  [employer] Otolaryngology PGY-3  12/13/2024 7:15 AM

## 2024-12-13 NOTE — DISCHARGE INSTRUCTIONS
Discharge Instructions - Endoscopic Sinus Surgery    Please Read and Follow These Instructions for the Best Outcomes Following Your Surgery!      WHAT TO EXPECT FOLLOWING SURGERY:    Bleeding - A drip from the nose and into the back of the throat is normal for the first two days following surgery. For flowing blood (like a faucet), contact the ENT Doctor on call as noted below.    Nasal congestion, fullness, facial pain, headache, and disrupted sleep are normal for the first week and are an expected part of the healing process. This will improve after the first debridement is performed in clinic about one week following surgery.     NASAL IRRIGATIONS:    Good post-operative irrigation on your part is essential to a successful outcome!    Instructions:  Use the Irrigation bottle/method mixed with a sterile saline solution at least three times per day.  Use distilled, bottled or boiled water tap to make up the saline solution according to the instructions.    ACTIVITY:      Minimize your activities with only light activity for the first week following surgery.   Listen to your body!  If you feel tired over the first few days, you should rest.  No nose blowing, stooping, straining or heavy lifting > 2 bags of groceries.   Sneeze with your mouth open.   Sleeping with your head elevated will reduce bleeding from the nose.  If you have been prescribed a CPAP machine, do not use until your doctor says it is safe; sleep in a recliner chair with your head elevated in the meantime.    Bandages: When you awaken from anesthesia you will have thin plastic splints sutured inside your nose. Leave these in place until your follow up appointment.     A drip pad or mustache dressing may also be taped to your upper lip.  This is a hammock for the nose and (1) helps collect the nasal discharge and (2) helps minimize any wiping or dabbing at the nose.  Change the drip pad as needed and discontinue the pad when the nasal discharge  resolves (usually after 2 - 3 days, but you can remove it whenever the bleeding has stopped).      A bloody discharge following nasal surgery is normal within the first 1 - 3 days.    In general, your nose will hurt very little unless you disturb it, so try to minimize movement or manipulation of the nose.     Elevating your head above your heart, especially during sleep, will also serve to minimize discomfort and prevent swelling.    Wound care: The most important rule for caring for your wounds and ensuring good healing is to keep the area clean and moist.  You are free to bathe the day after surgery, but your nasal splint must stay dry.  To clean your nose:  Use nasal saline spray every 2-3 hours to keep the nose moist and help prevent buildup of crusts within the nose.  After the saline spray, place a pea-sized amount of antibiotic ointment on a fresh Q-tip and apply to the inside of the nostril and on the visible skin sutures. DO NOT put the Q-tip in further than the white cotton end.    MEDICATIONS:      Resume your home medications   Do not take any aspirin containing products or blood thinners (Goody's, BC Powder or Lacy aspirin) until told to by your surgeon  Do not take any intranasal medicines until told to by your surgeon  Pain:    Mild to moderate pain: Over the counter Tylenol (Acetaminophen) as per the instructions on the bottle  Severe pain: Take your pain medicine prescription as directed:  NEVER DRIVE A VEHICLE OR OPERATE HEAVY MACHINERY WHEN TAKING PRESCRIPTION PAIN MEDICINE!    DRIVING:    Do not drive within 24 hours of receiving anesthesia    Do not drive while taking prescription pain medication    DIET:    Resume a healthy, balanced, normal diet as you feel up to it.    FOLLOW-UP:      You will have important follow-up appointments starting about one week following surgery    You should take a prescription pain pill (with food) prior to the first follow up visit in preparation for a clinic  debridement.  If you do not have an appointment, please call the numbers below to schedule a visit.    APPOINTMENTS:    [facility]: [phone]      Call our office or go to the Emergency Room immediately if you have any of the following:  Any vision problems/changes  Fever over 101.4 degrees  Neck stiffness  Heavy or prolonged bright red bleeding from the nose    Regular hours:  [phone] to speak with a nurse.  After hours:  [phone] and ask for the Ear, Nose and Throat (ENT) doctor on call.

## 2024-12-14 ENCOUNTER — HOSPITAL ENCOUNTER (EMERGENCY)
Facility: HOSPITAL | Age: 39
Discharge: HOME | End: 2024-12-14
Attending: EMERGENCY MEDICINE
Payer: MEDICAID

## 2024-12-14 VITALS
BODY MASS INDEX: 22.36 KG/M2 | DIASTOLIC BLOOD PRESSURE: 80 MMHG | WEIGHT: 159.75 LBS | HEART RATE: 82 BPM | TEMPERATURE: 98 F | SYSTOLIC BLOOD PRESSURE: 146 MMHG | HEIGHT: 71 IN | RESPIRATION RATE: 18 BRPM | OXYGEN SATURATION: 99 %

## 2024-12-14 DIAGNOSIS — R04.0 EPISTAXIS: Primary | ICD-10-CM

## 2024-12-14 LAB
ALBUMIN SERPL-MCNC: 3.2 G/DL (ref 3.5–5)
ALBUMIN/GLOB SERPL: 1.1 RATIO (ref 1.1–2)
ALP SERPL-CCNC: 62 UNIT/L (ref 40–150)
ALT SERPL-CCNC: 17 UNIT/L (ref 0–55)
ANION GAP SERPL CALC-SCNC: 7 MEQ/L
APTT PPP: 24.7 SECONDS (ref 23.2–33.7)
AST SERPL-CCNC: 29 UNIT/L (ref 5–34)
BASOPHILS # BLD AUTO: 0.09 X10(3)/MCL
BASOPHILS NFR BLD AUTO: 0.7 %
BILIRUB SERPL-MCNC: 0.7 MG/DL
BUN SERPL-MCNC: 15.5 MG/DL (ref 8.9–20.6)
CALCIUM SERPL-MCNC: 8.5 MG/DL (ref 8.4–10.2)
CHLORIDE SERPL-SCNC: 105 MMOL/L (ref 98–107)
CO2 SERPL-SCNC: 26 MMOL/L (ref 22–29)
CREAT SERPL-MCNC: 1.1 MG/DL (ref 0.72–1.25)
CREAT/UREA NIT SERPL: 14
EOSINOPHIL # BLD AUTO: 0.14 X10(3)/MCL (ref 0–0.9)
EOSINOPHIL NFR BLD AUTO: 1 %
ERYTHROCYTE [DISTWIDTH] IN BLOOD BY AUTOMATED COUNT: 12.8 % (ref 11.5–17)
GFR SERPLBLD CREATININE-BSD FMLA CKD-EPI: >60 ML/MIN/1.73/M2
GLOBULIN SER-MCNC: 3 GM/DL (ref 2.4–3.5)
GLUCOSE SERPL-MCNC: 239 MG/DL (ref 74–100)
GROUP & RH: NORMAL
HCT VFR BLD AUTO: 32.2 % (ref 42–52)
HGB BLD-MCNC: 11.1 G/DL (ref 14–18)
HOLD SPECIMEN: NORMAL
IMM GRANULOCYTES # BLD AUTO: 0.04 X10(3)/MCL (ref 0–0.04)
IMM GRANULOCYTES NFR BLD AUTO: 0.3 %
INDIRECT COOMBS: NORMAL
INR PPP: 1
LYMPHOCYTES # BLD AUTO: 1.64 X10(3)/MCL (ref 0.6–4.6)
LYMPHOCYTES NFR BLD AUTO: 12.1 %
MCH RBC QN AUTO: 30 PG (ref 27–31)
MCHC RBC AUTO-ENTMCNC: 34.5 G/DL (ref 33–36)
MCV RBC AUTO: 87 FL (ref 80–94)
MONOCYTES # BLD AUTO: 0.92 X10(3)/MCL (ref 0.1–1.3)
MONOCYTES NFR BLD AUTO: 6.8 %
NEUTROPHILS # BLD AUTO: 10.71 X10(3)/MCL (ref 2.1–9.2)
NEUTROPHILS NFR BLD AUTO: 79.1 %
NRBC BLD AUTO-RTO: 0 %
PLATELET # BLD AUTO: 232 X10(3)/MCL (ref 130–400)
PMV BLD AUTO: 10.3 FL (ref 7.4–10.4)
POTASSIUM SERPL-SCNC: 3.7 MMOL/L (ref 3.5–5.1)
PROT SERPL-MCNC: 6.2 GM/DL (ref 6.4–8.3)
PROTHROMBIN TIME: 13 SECONDS (ref 11.4–14)
RBC # BLD AUTO: 3.7 X10(6)/MCL (ref 4.7–6.1)
SODIUM SERPL-SCNC: 138 MMOL/L (ref 136–145)
SPECIMEN OUTDATE: NORMAL
WBC # BLD AUTO: 13.54 X10(3)/MCL (ref 4.5–11.5)

## 2024-12-14 PROCEDURE — 85610 PROTHROMBIN TIME: CPT | Performed by: EMERGENCY MEDICINE

## 2024-12-14 PROCEDURE — 86900 BLOOD TYPING SEROLOGIC ABO: CPT | Performed by: EMERGENCY MEDICINE

## 2024-12-14 PROCEDURE — 80053 COMPREHEN METABOLIC PANEL: CPT | Performed by: EMERGENCY MEDICINE

## 2024-12-14 PROCEDURE — 85025 COMPLETE CBC W/AUTO DIFF WBC: CPT | Performed by: EMERGENCY MEDICINE

## 2024-12-14 PROCEDURE — 99283 EMERGENCY DEPT VISIT LOW MDM: CPT | Mod: 25

## 2024-12-14 PROCEDURE — 25000003 PHARM REV CODE 250: Performed by: EMERGENCY MEDICINE

## 2024-12-14 PROCEDURE — 85730 THROMBOPLASTIN TIME PARTIAL: CPT | Performed by: EMERGENCY MEDICINE

## 2024-12-14 RX ORDER — OXYMETAZOLINE HCL 0.05 %
2 SPRAY, NON-AEROSOL (ML) NASAL 2 TIMES DAILY
Status: DISCONTINUED | OUTPATIENT
Start: 2024-12-14 | End: 2024-12-14 | Stop reason: HOSPADM

## 2024-12-14 RX ADMIN — OXYMETAZOLINE HCL 2 SPRAY: 0.05 SPRAY NASAL at 10:12

## 2024-12-14 NOTE — ED PROVIDER NOTES
ED PROVIDER NOTE  12/14/2024    CHIEF COMPLAINT:   Chief Complaint   Patient presents with    Epistaxis     Pt has surgery on nose yesterday.  Pt instructed to return to ED if nose was still bleeding today.  Pt reports that he has changed nasal dressings several times last night and started using regular tampons in his nose which he has changed about 7-8 times.       HISTORY OF PRESENT ILLNESS:   Anthony Hadley is a 38 y.o. male who presents with chief complaint Nosebleed.  Patient states is nose has been bleeding since he had surgery on his nose yesterday.  States he has had to change his mustache dressing numerous times last night.    The history is provided by the patient.         REVIEW OF SYSTEMS: as noted in the HPI.  NURSING NOTES REVIEWED      PAST MEDICAL/SURGICAL HISTORY:   Past Medical History:   Diagnosis Date    Anxiety     Depression     Diabetes mellitus type I     Hyperkalemia 12/02/2022    Type 1 diabetes mellitus with diabetic polyneuropathy 05/23/2022      Past Surgical History:   Procedure Laterality Date    APPENDECTOMY      CHOLECYSTECTOMY      FOOT SURGERY      bone spure and fracture       FAMILY HISTORY:   Family History   Problem Relation Name Age of Onset    Heart attack Mother      Heart disease Father      Diabetes Father      Crohn's disease Father         SOCIAL HISTORY:   Social History     Tobacco Use    Smoking status: Former     Current packs/day: 0.00     Types: Cigarettes     Quit date: 02/2009     Years since quitting: 15.8    Smokeless tobacco: Never    Tobacco comments:     Medical weed     Pt states stopped smoking cigs in years (15 years)    Substance Use Topics    Alcohol use: Not Currently     Comment: socially    Drug use: Yes     Types: Marijuana     Comment: medically prescribed to prevent nausea       ALLERGIES:   Review of patient's allergies indicates:   Allergen Reactions    Meropenem Anaphylaxis     Cardiac arrest     Pcn [penicillins] Hives    Topamax [topiramate]  Hives       PHYSICAL EXAM:  Initial Vitals [12/14/24 0952]   BP Pulse Resp Temp SpO2   135/83 94 14 98 °F (36.7 °C) 98 %      MAP       --         Physical Exam    Nursing note and vitals reviewed.  Constitutional: He appears well-developed and well-nourished. No distress.   HENT:   Head: Normocephalic and atraumatic.   Nose: Epistaxis is observed. Mouth/Throat: Oropharynx is clear and moist and mucous membranes are normal.   Eyes: Conjunctivae and EOM are normal. Pupils are equal, round, and reactive to light.   Neck: Neck supple. No tracheal deviation present.   Cardiovascular:  Normal rate, regular rhythm, normal heart sounds, intact distal pulses and normal pulses.           Pulmonary/Chest: Effort normal and breath sounds normal. No respiratory distress.   Abdominal: Abdomen is soft. There is no abdominal tenderness. There is no rebound and no guarding.   Musculoskeletal:         General: Normal range of motion.      Cervical back: Neck supple.     Neurological: He is alert and oriented to person, place, and time. GCS eye subscore is 4. GCS verbal subscore is 5. GCS motor subscore is 6.   CN II-XII intact. Moves all extremities. No gross sensory or motor deficits.   Skin: Skin is warm, dry and intact.   Psychiatric: He has a normal mood and affect. His speech is normal and behavior is normal. Judgment and thought content normal. Cognition and memory are normal.         RESULTS:  Labs Reviewed   COMPREHENSIVE METABOLIC PANEL - Abnormal       Result Value    Sodium 138      Potassium 3.7      Chloride 105      CO2 26      Glucose 239 (*)     Blood Urea Nitrogen 15.5      Creatinine 1.10      Calcium 8.5      Protein Total 6.2 (*)     Albumin 3.2 (*)     Globulin 3.0      Albumin/Globulin Ratio 1.1      Bilirubin Total 0.7      ALP 62      ALT 17      AST 29      eGFR >60      Anion Gap 7.0      BUN/Creatinine Ratio 14     CBC WITH DIFFERENTIAL - Abnormal    WBC 13.54 (*)     RBC 3.70 (*)     Hgb 11.1 (*)     Hct  32.2 (*)     MCV 87.0      MCH 30.0      MCHC 34.5      RDW 12.8      Platelet 232      MPV 10.3      Neut % 79.1      Lymph % 12.1      Mono % 6.8      Eos % 1.0      Basophil % 0.7      Lymph # 1.64      Neut # 10.71 (*)     Mono # 0.92      Eos # 0.14      Baso # 0.09      IG# 0.04      IG% 0.3      NRBC% 0.0     APTT - Normal    PTT 24.7     PROTIME-INR - Normal    PT 13.0      INR 1.0     CBC W/ AUTO DIFFERENTIAL    Narrative:     The following orders were created for panel order CBC auto differential.  Procedure                               Abnormality         Status                     ---------                               -----------         ------                     CBC with Differential[[id number]]       Abnormal            Final result                 Please view results for these tests on the individual orders.   EXTRA TUBES    Narrative:     The following orders were created for panel order EXTRA TUBES.  Procedure                               Abnormality         Status                     ---------                               -----------         ------                     Gold Top Hold[[id number]]                                   Final result               Pink Top Hold[[id number]]                                                                Please view results for these tests on the individual orders.   GOLD TOP HOLD    Extra Tube Hold for add-ons.     TYPE & SCREEN    Group & Rh B NEG      Indirect Luiza GEL NEG      Specimen Outdate 12/17/2024 23:59       Imaging Results    None         PROCEDURES:  Procedures    ECG:       ED COURSE AND MEDICAL DECISION MAKING:  Medications - No data to display    ED Course as of 12/14/24 1609   Sat Dec 14, 2024   1038 WBC(!): 13.54 [IB]   1038 Hemoglobin(!): 11.1 [IB]   1038 Platelet Count: 232 [IB]   1038 PTT: 24.7 [IB]   1038 PT: 13.0 [IB]   1044 Creatinine: 1.10 [IB]      ED Course User Index  [IB] Nasim Narayanan, DO        Medical Decision  Making  38-year-old male who presents with nosebleed after having nasal surgery yesterday.  ENT consulted and recommend applying Afrin.  This was effective in obtaining hemostasis.  We will discharge with Afrin and instructed to keep his follow up appointment.  Given strict ED return precautions. I have spoken with the patient and/or caregivers. I have explained the patient's condition, diagnoses and treatment plan based on the information available to me at this time. I have answered the patient's and/or caregiver's questions and addressed any concerns. The patient and/or caregivers have as good an understanding of the patient's diagnosis, condition and treatment plan as can be expected at this point. The vital signs have been stable. The patient's condition is stable and appropriate for discharge from the emergency department.     The patient will pursue further outpatient evaluation with the primary care physician or other designated or consulting physician as outlined in the discharge instructions. The patient and/or caregivers are agreeable to this plan of care and follow-up instructions have been explained in detail. The patient and/or caregivers have received these instructions in written format and have expressed an understanding of the discharge instructions. The patient and/or caregivers are aware that any significant change in condition or worsening of symptoms should prompt an immediate return to this or the closest emergency department or a call to 911.    Amount and/or Complexity of Data Reviewed  Labs: ordered. Decision-making details documented in ED Course.  Discussion of management or test interpretation with external provider(s): Case discussed with ENT, Dr. Desai, who recommends giving the patient Afrin to use should he have any further bleeding.    Risk  OTC drugs.  Prescription drug management.        CLINICAL IMPRESSION:  1. Epistaxis        DISPOSITION:   ED Disposition Condition     Discharge Stable            ED Prescriptions    None       Follow-up Information       Follow up With Specialties Details Why Contact Info    Nasrin Arambula, FNP Family Medicine Schedule an appointment as soon as possible for a visit   [facility] [facility] [id number] [phone]      [facility] Emergency Medicine  If symptoms worsen [address] [facility] [id number] [phone]               Nasim Narayanan,   12/14/24 0977

## 2024-12-16 VITALS
RESPIRATION RATE: 20 BRPM | HEART RATE: 90 BPM | WEIGHT: 160.19 LBS | DIASTOLIC BLOOD PRESSURE: 76 MMHG | SYSTOLIC BLOOD PRESSURE: 133 MMHG | OXYGEN SATURATION: 98 % | TEMPERATURE: 98 F | BODY MASS INDEX: 22.34 KG/M2

## 2024-12-16 DIAGNOSIS — J32.4 CHRONIC PANSINUSITIS: ICD-10-CM

## 2024-12-16 LAB — POCT GLUCOSE: 134 MG/DL (ref 70–110)

## 2024-12-16 NOTE — TELEPHONE ENCOUNTER
"Patient called: out of pain meds and in terrible pain. States "nothing helps"  Has been doing ice 20/20, tylenol and naproxen.  The pain meds were helped him to sleep.  Patient was crying and agitated. Reiterated several times that he is not a drug addict and that I can check his chart to verify that. Also said that he has always passed his drug tests. He would go from tears with an elevated distressed voice to elevated agitated voice.   Without waiting for a response from me he stated that he was going to "her when she gets off of work to get him a bottle of whiskey" and he will drink that to help him go to sleep.    I told him to continue the ice and alternating tylenol/ ibuprofen. What he was experiencing was normal and will get better.  The pressure he is feeling is from the splints.  Once they are removed he will feel better.  And that I would let the doctors know that he was in pain. No guarantee that pain meds would be refilled.   "

## 2024-12-17 ENCOUNTER — TELEPHONE (OUTPATIENT)
Dept: ENDOCRINOLOGY | Facility: CLINIC | Age: 39
End: 2024-12-17
Payer: MEDICAID

## 2024-12-17 DIAGNOSIS — J32.4 CHRONIC PANSINUSITIS: ICD-10-CM

## 2024-12-17 DIAGNOSIS — E10.65 TYPE 1 DIABETES MELLITUS WITH HYPERGLYCEMIA: Primary | ICD-10-CM

## 2024-12-17 PROBLEM — J34.89 NASAL OBSTRUCTION: Status: ACTIVE | Noted: 2024-12-17

## 2024-12-17 PROBLEM — J34.2 NASAL SEPTAL DEVIATION: Status: ACTIVE | Noted: 2024-12-17

## 2024-12-17 PROBLEM — J34.3 NASAL TURBINATE HYPERTROPHY: Status: ACTIVE | Noted: 2024-12-17

## 2024-12-17 LAB
ESTROGEN SERPL-MCNC: NORMAL PG/ML
INSULIN SERPL-ACNC: NORMAL U[IU]/ML
LAB AP CLINICAL INFORMATION: NORMAL
LAB AP GROSS DESCRIPTION: NORMAL
LAB AP REPORT FOOTNOTES: NORMAL
T3RU NFR SERPL: NORMAL %

## 2024-12-17 RX ORDER — HYDROCODONE BITARTRATE AND ACETAMINOPHEN 7.5; 325 MG/1; MG/1
1 TABLET ORAL EVERY 6 HOURS PRN
Qty: 15 TABLET | Refills: 0 | OUTPATIENT
Start: 2024-12-17

## 2024-12-17 RX ORDER — HYDROCODONE BITARTRATE AND ACETAMINOPHEN 7.5; 325 MG/1; MG/1
1 TABLET ORAL EVERY 6 HOURS PRN
Qty: 24 TABLET | Refills: 0 | Status: SHIPPED | OUTPATIENT
Start: 2024-12-17 | End: 2024-12-19 | Stop reason: SDUPTHER

## 2024-12-17 NOTE — TELEPHONE ENCOUNTER
----- Message from Darya Narayanan PA-C sent at 12/17/2024  9:37 AM CST -----  Regarding: [employer] supplies  Please call patient and let him know that we received a letter from Medicaid requiring us to obtain some labs to prove that he is type 1 and needing insulin pump. Orders placed and need to be done in a fasting state (glucose has to be less than 225 when labs are drawn). He needs to get these done asap for Medicaid to continue to cover all pump supplies.

## 2024-12-19 ENCOUNTER — OFFICE VISIT (OUTPATIENT)
Dept: OTOLARYNGOLOGY | Facility: CLINIC | Age: 39
End: 2024-12-19
Payer: MEDICAID

## 2024-12-19 ENCOUNTER — OFFICE VISIT (OUTPATIENT)
Dept: FAMILY MEDICINE | Facility: CLINIC | Age: 39
End: 2024-12-19
Payer: MEDICAID

## 2024-12-19 ENCOUNTER — TELEPHONE (OUTPATIENT)
Dept: ENDOCRINOLOGY | Facility: CLINIC | Age: 39
End: 2024-12-19
Payer: MEDICAID

## 2024-12-19 VITALS
SYSTOLIC BLOOD PRESSURE: 117 MMHG | BODY MASS INDEX: 21.14 KG/M2 | HEART RATE: 86 BPM | TEMPERATURE: 98 F | DIASTOLIC BLOOD PRESSURE: 77 MMHG | RESPIRATION RATE: 18 BRPM | HEIGHT: 71 IN | WEIGHT: 151 LBS | OXYGEN SATURATION: 100 %

## 2024-12-19 VITALS
RESPIRATION RATE: 20 BRPM | HEART RATE: 88 BPM | DIASTOLIC BLOOD PRESSURE: 77 MMHG | BODY MASS INDEX: 21.14 KG/M2 | HEIGHT: 71 IN | OXYGEN SATURATION: 99 % | SYSTOLIC BLOOD PRESSURE: 112 MMHG | WEIGHT: 151 LBS | TEMPERATURE: 98 F

## 2024-12-19 DIAGNOSIS — J34.2 NOSE SEPTUM DEVIATION: ICD-10-CM

## 2024-12-19 DIAGNOSIS — I15.8 OTHER SECONDARY HYPERTENSION: Primary | ICD-10-CM

## 2024-12-19 DIAGNOSIS — E10.65 TYPE 1 DIABETES MELLITUS WITH HYPERGLYCEMIA: ICD-10-CM

## 2024-12-19 DIAGNOSIS — F32.A DEPRESSION, UNSPECIFIED DEPRESSION TYPE: ICD-10-CM

## 2024-12-19 DIAGNOSIS — E11.9 ENCOUNTER FOR DIABETIC FOOT EXAM: ICD-10-CM

## 2024-12-19 DIAGNOSIS — J34.89 NASAL OBSTRUCTION: Primary | ICD-10-CM

## 2024-12-19 DIAGNOSIS — R80.9 MICROALBUMINURIA: ICD-10-CM

## 2024-12-19 DIAGNOSIS — K31.84 DIABETIC GASTROPARESIS: ICD-10-CM

## 2024-12-19 DIAGNOSIS — R11.2 NAUSEA AND VOMITING, UNSPECIFIED VOMITING TYPE: ICD-10-CM

## 2024-12-19 DIAGNOSIS — J34.89 NASAL CRUSTING: ICD-10-CM

## 2024-12-19 DIAGNOSIS — Z98.890 HISTORY OF NASAL SEPTOPLASTY: ICD-10-CM

## 2024-12-19 DIAGNOSIS — E11.43 DIABETIC GASTROPARESIS: ICD-10-CM

## 2024-12-19 DIAGNOSIS — F41.9 ANXIETY: ICD-10-CM

## 2024-12-19 DIAGNOSIS — J32.4 CHRONIC PANSINUSITIS: ICD-10-CM

## 2024-12-19 DIAGNOSIS — F90.9 ATTENTION DEFICIT HYPERACTIVITY DISORDER (ADHD), UNSPECIFIED ADHD TYPE: ICD-10-CM

## 2024-12-19 LAB
ALBUMIN SERPL-MCNC: 3.1 G/DL (ref 3.5–5)
ALBUMIN/GLOB SERPL: 0.8 RATIO (ref 1.1–2)
ALP SERPL-CCNC: 67 UNIT/L (ref 40–150)
ALT SERPL-CCNC: 18 UNIT/L (ref 0–55)
ANION GAP SERPL CALC-SCNC: 7 MEQ/L
AST SERPL-CCNC: 23 UNIT/L (ref 5–34)
BILIRUB SERPL-MCNC: 0.2 MG/DL
BUN SERPL-MCNC: 17.9 MG/DL (ref 8.9–20.6)
CALCIUM SERPL-MCNC: 9.2 MG/DL (ref 8.4–10.2)
CHLORIDE SERPL-SCNC: 100 MMOL/L (ref 98–107)
CO2 SERPL-SCNC: 32 MMOL/L (ref 22–29)
CREAT SERPL-MCNC: 1.13 MG/DL (ref 0.72–1.25)
CREAT/UREA NIT SERPL: 16
GFR SERPLBLD CREATININE-BSD FMLA CKD-EPI: >60 ML/MIN/1.73/M2
GLOBULIN SER-MCNC: 3.8 GM/DL (ref 2.4–3.5)
GLUCOSE SERPL-MCNC: 206 MG/DL (ref 74–100)
POTASSIUM SERPL-SCNC: 4 MMOL/L (ref 3.5–5.1)
PROT SERPL-MCNC: 6.9 GM/DL (ref 6.4–8.3)
SODIUM SERPL-SCNC: 139 MMOL/L (ref 136–145)

## 2024-12-19 PROCEDURE — 84681 ASSAY OF C-PEPTIDE: CPT | Performed by: NURSE PRACTITIONER

## 2024-12-19 PROCEDURE — 36415 COLL VENOUS BLD VENIPUNCTURE: CPT | Performed by: NURSE PRACTITIONER

## 2024-12-19 PROCEDURE — 99214 OFFICE O/P EST MOD 30 MIN: CPT | Mod: PBBFAC,PN | Performed by: NURSE PRACTITIONER

## 2024-12-19 PROCEDURE — 80053 COMPREHEN METABOLIC PANEL: CPT | Performed by: NURSE PRACTITIONER

## 2024-12-19 PROCEDURE — 86341 ISLET CELL ANTIBODY: CPT | Performed by: NURSE PRACTITIONER

## 2024-12-19 PROCEDURE — S0119 ONDANSETRON 4 MG: HCPCS | Mod: PBBFAC,PN

## 2024-12-19 PROCEDURE — 99214 OFFICE O/P EST MOD 30 MIN: CPT | Mod: PBBFAC,27 | Performed by: STUDENT IN AN ORGANIZED HEALTH CARE EDUCATION/TRAINING PROGRAM

## 2024-12-19 RX ORDER — ONDANSETRON 4 MG/1
8 TABLET, ORALLY DISINTEGRATING ORAL ONCE
Status: COMPLETED | OUTPATIENT
Start: 2024-12-19 | End: 2024-12-19

## 2024-12-19 RX ORDER — PAROXETINE HYDROCHLORIDE 20 MG/1
20 TABLET, FILM COATED ORAL EVERY MORNING
Qty: 30 TABLET | Refills: 11 | Status: SHIPPED | OUTPATIENT
Start: 2024-12-19

## 2024-12-19 RX ORDER — ONDANSETRON 8 MG/1
8 TABLET, ORALLY DISINTEGRATING ORAL 2 TIMES DAILY
Qty: 20 TABLET | Refills: 6 | Status: SHIPPED | OUTPATIENT
Start: 2024-12-19

## 2024-12-19 RX ORDER — DEXTROAMPHETAMINE SACCHARATE, AMPHETAMINE ASPARTATE, DEXTROAMPHETAMINE SULFATE AND AMPHETAMINE SULFATE 5; 5; 5; 5 MG/1; MG/1; MG/1; MG/1
1 TABLET ORAL 2 TIMES DAILY
Qty: 60 TABLET | Refills: 0 | Status: SHIPPED | OUTPATIENT
Start: 2024-12-19

## 2024-12-19 RX ORDER — METOCLOPRAMIDE 5 MG/1
5 TABLET ORAL 4 TIMES DAILY
Qty: 120 TABLET | Refills: 11 | Status: SHIPPED | OUTPATIENT
Start: 2024-12-19

## 2024-12-19 RX ORDER — HYDROCODONE BITARTRATE AND ACETAMINOPHEN 7.5; 325 MG/1; MG/1
1 TABLET ORAL EVERY 6 HOURS PRN
Qty: 15 TABLET | Refills: 0 | Status: SHIPPED | OUTPATIENT
Start: 2024-12-19

## 2024-12-19 RX ADMIN — ONDANSETRON 8 MG: 4 TABLET, ORALLY DISINTEGRATING ORAL at 07:12

## 2024-12-19 NOTE — PROGRESS NOTES
The scope used for the exam was:  Flexible scope ENF-P4  Serial Number:  1)    [id number]    []   2)    [id number]    []   3)    [id number]    []   4)    [id number]    []   5)    [id number]    []   6)    [id number]    []       The scope used for the exam was:  Rigid scope   Serial Number:  1)   [id number]    []   2)   [id number]    []   3)   7330    []   4)    3384   []   5)    0824   []   6)    5554   []     7)   7425    []   8)   2240    []   9)   1109    []

## 2024-12-19 NOTE — PROGRESS NOTES
Patient Name: Anthony Hadley     : 1985    MRN: [id number]     Subjective:     Patient ID: Anthony Hadley is a 38 y.o. male.    Chief Complaint:   Chief Complaint   Patient presents with    Follow-up     Pt is here for follow up. Pt reports nausea with vomiting for the first time this morning d/t fasting. Pt c/o pain @ 8/10#'s scale        HPI: 24:  BP recheck. Took pain medication on an empty stomach and was throwing up prior to exam.  Feels better after Zofran but has HA because he just had sinus surgery. Recent ER visit for nosebleeds after deviated septum surgery.    BP today is 117/77 and this is after vomiting episode.  He needs labs for his Endocrinologist done.  Will re-enter orders here.          10/17/24:   ADHD FU.      ADHD-  Needs refill of Adderall.  He is taking 20 mg po BID. Adderall is working well.  He is compliant with .  He is compliant with drug screens.     Type 1 DM-  Pt also needs A1c ordered per his Endocrinologist.  He is doing well.         24: Review of labs 2 weeks.     Today, pt requesting referral to ENT for an issue with a nodule behind his right ear previously told was cancer and would like evaluated along with his sinuses that he has been told would need surgical intervention.     Microalbuminuria-  Review of labs revealed Microalbumin over 900.  He is not on any medication to protect his kidneys at this time. His BP is 109/74 today.  Long standing hx Type 1 DM and recently has not been managing well.  His A1c was 9.2 on labs.      Type 1 DM-  He has been Type 1 diabetic since . He is on an insulin pump.  He was told that a referral would be placed to Endocrinology here at [facility] for management of his disease process. He refused to see Endocrine citing that they all treat him like crap.  He is amenable to seeing wound care for diabetic foot care and also wants dermatology referral for scalp lesions that he has tried several OTC preparations for and nothing is working.   Pictures in Epic.      Heart disease-  He has hx heart disease but states was never told what type of heart disease.  Records from 2022 indicate a ALLAN was done and showed the following:    ALLAN 7/18/22  Technically difficult study   Normal thickness left ventricle with an ejection fraction estimated normal   around 55%   Mild mitral regurgitation   Mild tricuspid regurgitation   Pulmonary pressures mildly elevated RVSP estimated 34 mmHg   No pericardial effusion     ADHD/Anxiety/Depression-  Pt requesting referral to .  He is dyslexic as well.  He is on Adderall 20mg po BID.                         ROS:      Review of Systems   Gastrointestinal:  Positive for nausea and vomiting.   All other systems reviewed and are negative.       12 point review of systems conducted, negative except as stated in the history of present illness. See HPI for details.    History:     Past Medical History:   Diagnosis Date    Anxiety     Depression     Diabetes mellitus type I     Hyperkalemia 12/02/2022    Type 1 diabetes mellitus with diabetic polyneuropathy 05/23/2022        Past Surgical History:   Procedure Laterality Date    APPENDECTOMY      CHOLECYSTECTOMY      ETHMOIDECTOMY  12/13/2024    Procedure: ETHMOIDECTOMY;  Surgeon: Yohan Cartwright MD;  Location: [facility];  Service: ENT;;    FOOT SURGERY      bone spure and fracture    FUNCTIONAL ENDOSCOPIC SINUS SURGERY (FESS) Bilateral 12/13/2024    Procedure: ANTERIOR FESS, TURBINATE REDUCTION;  Surgeon: Yohan Cartwright MD;  Location: [facility] OR;  Service: ENT;  Laterality: Bilateral;  No Navigation    NASAL SEPTOPLASTY  12/13/2024    Procedure: SEPTOPLASTY, NOSE;  Surgeon: Yohan Cartwright MD;  Location: [facility] OR;  Service: ENT;;       Family History   Problem Relation Name Age of Onset    Heart attack Mother      Heart disease Father      Diabetes Father      Crohn's disease Father          Social History     Tobacco Use    Smoking status: Former     Current packs/day: 0.00  "    Types: Cigarettes     Quit date: 02/2009     Years since quitting: 15.8    Smokeless tobacco: Never    Tobacco comments:     Medical weed     Pt states stopped smoking cigs in years (15 years)    Substance and Sexual Activity    Alcohol use: Not Currently     Comment: socially    Drug use: Yes     Types: Marijuana     Comment: medically prescribed to prevent nausea    Sexual activity: Yes       Current Outpatient Medications   Medication Instructions    cholecalciferol (vitamin D3) 50,000 Units, Oral, Every 7 days    clindamycin (CLEOCIN T) 1 % Swab Topical (Top), Daily    dextroamphetamine-amphetamine (ADDERALL) 20 mg tablet 1 tablet, Oral, 2 times daily    doxycycline (VIBRAMYCIN) 100 mg, Oral, 2 times daily    HYDROcodone-acetaminophen (NORCO) 7.5-325 mg per tablet 1 tablet, Oral, Every 6 hours PRN    insulin lispro protamin-lispro (HUMALOG 50/50) 100 unit/mL (50-50) Susp 100 Units, Subcutaneous, Daily    ipratropium (ATROVENT) 21 mcg (0.03 %) nasal spray 2 sprays, Each Nostril, 3 times daily    lisinopriL (PRINIVIL,ZESTRIL) 5 mg, Oral, Daily    metoclopramide HCl (REGLAN) 5 mg, Oral, 4 times daily    mupirocin (BACTROBAN) 2 % ointment Topical (Top), 3 times daily    NON FORMULARY MEDICATION Pt states uses Medical Marijuana as needed    ondansetron (ZOFRAN-ODT) 8 mg, Oral, 2 times daily    paroxetine (PAXIL) 20 mg, Oral, Every morning    rosuvastatin (CRESTOR) 5 mg, Oral, Daily    sodium chloride (SALINE NASAL) 0.65 % nasal spray 1 spray, Nasal, Every 4 hours while awake        Review of patient's allergies indicates:   Allergen Reactions    Meropenem Anaphylaxis     Cardiac arrest     Pcn [penicillins] Hives    Topamax [topiramate] Hives       Objective:     Visit Vitals  /77 (BP Location: Left arm, Patient Position: Sitting)   Pulse 86   Temp 97.8 °F (36.6 °C) (Oral)   Resp 18   Ht 5' 11" (1.803 m)   Wt 68.5 kg (151 lb)   SpO2 100%   BMI 21.06 kg/m²       Physical Examination:     Physical " Exam  Vitals reviewed.   Constitutional:       Appearance: Normal appearance. He is normal weight.   HENT:      Head: Normocephalic.   Cardiovascular:      Rate and Rhythm: Normal rate and regular rhythm.      Pulses: Normal pulses.      Heart sounds: Normal heart sounds.   Pulmonary:      Effort: Pulmonary effort is normal.      Breath sounds: Normal breath sounds.   Abdominal:      General: Abdomen is flat.      Palpations: Abdomen is soft.   Musculoskeletal:         General: Normal range of motion.      Cervical back: Normal range of motion.   Skin:     General: Skin is warm and dry.      Coloration: Skin is pale.   Neurological:      Mental Status: He is alert.   Psychiatric:         Mood and Affect: Mood normal.         Lab Results:     Chemistry:  Lab Results   Component Value Date     12/14/2024    K 3.7 12/14/2024    BUN 15.5 12/14/2024    CREATININE 1.10 12/14/2024    EGFRNORACEVR >60 12/14/2024    GLUCOSE 239 (H) 12/14/2024    CALCIUM 8.5 12/14/2024    ALKPHOS 62 12/14/2024    LABPROT 6.2 (L) 12/14/2024    ALBUMIN 3.2 (L) 12/14/2024    AST 29 12/14/2024    ALT 17 12/14/2024    PGXDYAST25SX 17 (L) 10/28/2024    TSH 1.671 07/01/2024    DGDJZQ4QTKE 1.13 07/01/2024        Lab Results   Component Value Date    HGBA1C 7.9 (H) 10/28/2024        Hematology:  Lab Results   Component Value Date    WBC 13.54 (H) 12/14/2024    HGB 11.1 (L) 12/14/2024    HCT 32.2 (L) 12/14/2024     12/14/2024       Lipid Panel:  Lab Results   Component Value Date    CHOL 183 07/01/2024    HDL 49 07/01/2024    .00 07/01/2024    TRIG 115 07/01/2024    TOTALCHOLEST 4 07/01/2024        Urine:  Lab Results   Component Value Date    APPEARANCEUA Clear 07/01/2024    SGUA 1.023 07/01/2024    PROTEINUA 2+ (A) 07/01/2024    KETONESUA Negative 07/01/2024    LEUKOCYTESUR Negative 07/01/2024    RBCUA 0-5 07/01/2024    WBCUA 0-5 07/01/2024    BACTERIA Moderate (A) 07/01/2024    SQEPUA Trace (A) 07/01/2024    HYALINECASTS None  Seen 07/01/2024    CREATRANDUR 230.0 (H) 07/01/2024    PROTEINURINE 61.5 02/04/2024        Assessment:          ICD-10-CM ICD-9-CM   1. Other secondary hypertension  I15.8 405.99   2. Nausea and vomiting, unspecified vomiting type  R11.2 787.01   3. Attention deficit hyperactivity disorder (ADHD), unspecified ADHD type  F90.9 314.01   4. Type 1 diabetes mellitus with hyperglycemia  E10.65 250.01   5. Anxiety  F41.9 300.00   6. Depression, unspecified depression type  F32.A 311   7. Diabetic gastroparesis  E11.43 250.60    K31.84 536.3   8. Microalbuminuria  R80.9 791.0   9. Encounter for diabetic foot exam  E11.9 250.00        Plan:     1. Other secondary hypertension  Overview:  BP Readings from Last 3 Encounters:   12/19/24 117/77   12/14/24 (!) 146/80   12/13/24 133/76         Assessment & Plan:  2/2 to Adderall use.   Lisinopril 5mg po daily prescribed by Endocrine  BP today 117/77    Low Sodium Diet (Dash Diet - less than 2 grams of sodium per day).  Monitor Blood Pressure daily and log. Report any consistent numbers greater than 140/90.  Smoking Cessation encouraged to aid in BP reduction.  Maintain healthy weight with goal BMI <30. Exercise 30 minutes per day 5 days per week        2. Nausea and vomiting, unspecified vomiting type  -     ondansetron disintegrating tablet 8 mg  -     ondansetron (ZOFRAN-ODT) 8 MG TbDL; Take 1 tablet (8 mg total) by mouth 2 (two) times daily.  Dispense: 20 tablet; Refill: 6    3. Attention deficit hyperactivity disorder (ADHD), unspecified ADHD type  -     dextroamphetamine-amphetamine (ADDERALL) 20 mg tablet; Take 1 tablet by mouth 2 (two) times a day.  Dispense: 60 tablet; Refill: 0    4. Type 1 diabetes mellitus with hyperglycemia  -     C-Peptide  -     Comprehensive Metabolic Panel  -     Cancel: GAD65 Antibody, CSF  -     ANTI-ISLET CELL ANTIBODY  -     Glutamic Acid Decarboxylase  -     GAD65 Ab, Serum    5. Anxiety  -     paroxetine (PAXIL) 20 MG tablet; Take 1 tablet  (20 mg total) by mouth every morning.  Dispense: 30 tablet; Refill: 11    6. Depression, unspecified depression type  -     paroxetine (PAXIL) 20 MG tablet; Take 1 tablet (20 mg total) by mouth every morning.  Dispense: 30 tablet; Refill: 11    7. Diabetic gastroparesis  Assessment & Plan:  Reglan refilled today    Orders:  -     metoclopramide HCl (REGLAN) 5 MG tablet; Take 1 tablet (5 mg total) by mouth 4 (four) times daily.  Dispense: 120 tablet; Refill: 11    8. Microalbuminuria  Overview:   Latest Reference Range & Units 07/01/24 08:33   Urine Creatinine 63.0 - 166.0 mg/dL 230.0 (H)   Urine Microalbumin <=30.0 ug/mL 987.4 (H)   (H): Data is abnormally high    Assessment & Plan:  Lisinopril 5mg po daily started by Endocrine.      9. Encounter for diabetic foot exam  Assessment & Plan:  Performed in clinic today           Follow up in about 6 months (around 6/19/2025) for Wellness..  In addition to their scheduled follow up, the patient has also been instructed to follow up on as needed basis.         Future Appointments   Date Time Provider Department Center   12/19/2024  1:30 PM RESIDENT 2, [facility] OTORHINOLARYNGOLOGY [facility] ENT [facility]   12/30/2024 10:30 AM Darya Narayanan PA-C [facility]    2/3/2025 10:00 AM Darya Narayanan PA-C [facility]   2/19/2025  2:30 PM Priscilla Woo FNP [facility] OPND [place]    3/17/2025  2:30 PM PROVIDERS, [facility]

## 2024-12-19 NOTE — ASSESSMENT & PLAN NOTE
2/2 to Adderall use.   Lisinopril 5mg po daily prescribed by Endocrine  BP today 117/77    Low Sodium Diet (Dash Diet - less than 2 grams of sodium per day).  Monitor Blood Pressure daily and log. Report any consistent numbers greater than 140/90.  Smoking Cessation encouraged to aid in BP reduction.  Maintain healthy weight with goal BMI <30. Exercise 30 minutes per day 5 days per week

## 2024-12-19 NOTE — PROGRESS NOTES
[facility]  Otolaryngology Clinic Note    Anthony Hadley  Encounter Date: 12/19/2024  YOB: 1985  Physician: Rell Campa MD    Chief Complaint: hearing, sinus    HPI: 38 y.o. male referred for tinnitus, sinus concerns, and lesion behind right ear? States about a year ago, he began having otalgia and was told by an ED provider that he had cancer based off an MRI finding. States he has bumps behind his right ear which fluctuate in size. He was seen by an ENT in [place] who he states through his MRI disk in the garbage and told him it was just a swollen lymph node. States that doctor also told him he needed sinus surgery. Endorses nasal congestion R>L, rhinitis, facial pressure periorbitally/frontally, & epiphora. He does not take abx frequently for sinus infections but states he occasionally has to take IV abx for recurrent MRSA skin infections. Endorses fluctuant HL and tinnitus to his right ear which resolves briefly with auto-insufflation. Denies otorrhea. States he had recurrent ear infections when he was young but never had PE tubes or otologic surgery, that they got better as he grew up. He uses astelin prn when his nasal symptoms are severe.       9/17/24:  Here for follow-up.  Has been on nasal sprays for the past month without improvement in his symptoms.  Patient reports over the past couple of years he has had at least 20 sinus infections requiring antibiotics.  Patient also has trouble breathing when he is laying down.  Patient has never had sinus surgery.  Never been allergy tested.  Has tried nasal irrigations however he gets nasal vestibulitis very easily and avoids it  Patient also presents for evaluation of a right postauricular lymphadenopathy.  He reports it swells up when he gets sick.  Currently it is not swollen.  He also reports right-sided tinnitus.  Reports occasional hearing loss.  Had an audiogram today which does not show hearing loss.     10/30/24:   "Still complains of right-sided ear pain.  MRI within normal limits.  Still complaining of constant nasal drainage worsened with eating food.  Also complains of nasal congestion.  No relief with nasal sprays.    12/13/24: Surgery: Septoplasty, bilateral FESS (anterior ethmoidectomy on right, total ethmoidectomy on left)    12/19/24: Here for follow up. Doing alright - has been in significant pain and had to go to the OR for bleeding. No bleeding over the last day or so. Feels at times like he is suffocating.       Review of patient's allergies indicates:   Allergen Reactions    Meropenem Anaphylaxis     Cardiac arrest     Pcn [penicillins] Hives    Topamax [topiramate] Hives       Past Medical History:   Diagnosis Date    Anxiety     Depression     Diabetes mellitus type I     Hyperkalemia 12/02/2022    Type 1 diabetes mellitus with diabetic polyneuropathy 05/23/2022       Past Surgical History:   Procedure Laterality Date    APPENDECTOMY      CHOLECYSTECTOMY      ETHMOIDECTOMY  12/13/2024    Procedure: ETHMOIDECTOMY;  Surgeon: Yohan Cartwright MD;  Location: [facility];  Service: ENT;;    FOOT SURGERY      bone spure and fracture    FUNCTIONAL ENDOSCOPIC SINUS SURGERY (FESS) Bilateral 12/13/2024    Procedure: ANTERIOR FESS, TURBINATE REDUCTION;  Surgeon: Yohan Cartwright MD;  Location: [facility] OR;  Service: ENT;  Laterality: Bilateral;  No Navigation    NASAL SEPTOPLASTY  12/13/2024    Procedure: SEPTOPLASTY, NOSE;  Surgeon: Yohan Cartwright MD;  Location: [facility] OR;  Service: ENT;;         Physical Exam:  Vitals:    12/19/24 1328   BP: 112/77   BP Location: Left arm   Patient Position: Sitting   Pulse: 88   Resp: 20   Temp: 97.6 °F (36.4 °C)   TempSrc: Oral   SpO2: 99%   Weight: 68.5 kg (151 lb 0.2 oz)   Height: 5' 11" (1.803 m)     In mild distress secondary to pain   Cain splints in place, removed today  Nasal cavity with packing in place and with significant crusting and mucous. For this reason nasal endoscopy " and debridement is indicated    Procedure: Endoscopic Sinonasal Debridement, bilateral    Sinonasal Endoscopy with Debridement: Due to the patient's chronic sinusitis/chronic rhinitis, sinonasal endoscopy with debridement is indicated.  After discussion of risks and benefits, and topical decongestion and anesthesia, an endoscope was used to perform nasal endoscopy with debridement. Suctions and Delfin forceps were used to remove crust and fibrin debris from the affected sinuses without complications.   A time out identifying the patient, the procedure, the location of the procedure and any concerns was performed prior to beginning the procedure.    Surgeon: Rell Campa MD  Preoperative diagnosis:   1. Chronic pansinusitis      Postoperative diagnosis: Same  Estimated blood loss: None  Complications: None  Anesthetic: None    Procedure Detail: After achieving adequate topical anesthesia if required and after performing an appropriate time-out procedure, under guidance with a 3 mm 0-degree endoscope, endoscopic debridement of the patient's bilateral sinonasal cavity is performed.  We performed a timeout prior to proceeding, which included appropriate identification of the correct patient, procedure, and operative site. Findings are as detailed below.    Septum midline  Both inferior turbinates well reduced, still healing with some granulation tissue. No bleeding  On right, mild scarring of middle turbinate to lateral nasal wall. This was freed with suctioned. Behind this, absorbable sponge in the middle meatus was suctioned partially.   On the left, middle turbinate was well medialized. Some nasopore suctioned from middle meatus. Dry crusting posteriorly.       Pertinent Data:  No new data to review      Assessment/Plan: 38 y.o. male with flunctuant HL and tinnitus, AR, likely ETD, and chronic rhinosinusitis. He is now s/p anterior FESS & septoplasty on 12/13/24. He is healing well.     - Continue nasal saline  irrigation at least twice daily  - Hold off on Flonase and Astelin until after next debridement.  Start Atrovent for potential vasomotor rhinitis  - Will prescribe one more course of Norco to help with pain. Advised to use tylenol and ibuprofen first (and not to mix tylenol with Norco). His pain should be much improved now that his Cain splints are removed.   - RTC 2-3 weeks for reevaluation and possible repeat debridement    Rell Campa MD  [employer] Otolaryngology PGY-5  12/19/2024 2:30 PM

## 2024-12-20 LAB — C PEPTIDE P FAST SERPL-MCNC: <0.1 NG/ML (ref 1.1–4.4)

## 2024-12-27 ENCOUNTER — TELEPHONE (OUTPATIENT)
Dept: ENDOCRINOLOGY | Facility: CLINIC | Age: 39
End: 2024-12-27
Payer: MEDICAID

## 2024-12-27 NOTE — TELEPHONE ENCOUNTER
----- Message from Darya Narayanan PA-C sent at 12/24/2024  7:42 AM CST -----  Regarding: Labs  Please fax results of c-peptide and the CMP that was drawn on the same day as the c-peptide to [facility] at [phone]. I also have a paper at my desk that needs to be faxed with it

## 2025-01-09 ENCOUNTER — OFFICE VISIT (OUTPATIENT)
Dept: OTOLARYNGOLOGY | Facility: CLINIC | Age: 40
End: 2025-01-09
Payer: MEDICAID

## 2025-01-09 VITALS
HEIGHT: 71 IN | SYSTOLIC BLOOD PRESSURE: 155 MMHG | OXYGEN SATURATION: 99 % | BODY MASS INDEX: 21.14 KG/M2 | RESPIRATION RATE: 20 BRPM | TEMPERATURE: 98 F | HEART RATE: 89 BPM | DIASTOLIC BLOOD PRESSURE: 93 MMHG | WEIGHT: 151 LBS

## 2025-01-09 DIAGNOSIS — J34.2 NOSE SEPTUM DEVIATION: ICD-10-CM

## 2025-01-09 DIAGNOSIS — E10.65 TYPE 1 DIABETES MELLITUS WITH HYPERGLYCEMIA: ICD-10-CM

## 2025-01-09 DIAGNOSIS — J34.89 NASAL OBSTRUCTION: ICD-10-CM

## 2025-01-09 DIAGNOSIS — J32.4 CHRONIC PANSINUSITIS: Primary | ICD-10-CM

## 2025-01-09 DIAGNOSIS — J34.89 NASAL CRUSTING: ICD-10-CM

## 2025-01-09 PROCEDURE — 99214 OFFICE O/P EST MOD 30 MIN: CPT | Mod: PBBFAC | Performed by: OTOLARYNGOLOGY

## 2025-01-09 PROCEDURE — 31231 NASAL ENDOSCOPY DX: CPT | Mod: PBBFAC

## 2025-01-09 RX ORDER — IBUPROFEN 800 MG/1
800 TABLET ORAL 3 TIMES DAILY
Qty: 21 TABLET | Refills: 0 | Status: SHIPPED | OUTPATIENT
Start: 2025-01-09 | End: 2025-01-16

## 2025-01-09 NOTE — PROGRESS NOTES
I have reviewed and agree with the resident's findings, including all diagnostic interpretations and plans as written.     Yohan Cartwright M.D.    Writer contacted pt and he agrees to the Eligard 45 mg injection (6 month dose) for tomorrow here at  at 4pm.  The auth has been completed and med is auth'd.  We have the medication in the [facility].  Appt made.     Pt will continue with oral Enzalutamide.  The next order for this has been signed and released by MD to be dispensed from the [facility].    Devorah Lewis RN, OCN        no

## 2025-01-09 NOTE — PROGRESS NOTES
[facility]  Otolaryngology Clinic Note    Anthony Hadley  Encounter Date: 1/9/2025  YOB: 1985  Physician: Mary Yanes MD    Chief Complaint: hearing, sinus    HPI: 38 y.o. male referred for tinnitus, sinus concerns, and lesion behind right ear? States about a year ago, he began having otalgia and was told by an ED provider that he had cancer based off an MRI finding. States he has bumps behind his right ear which fluctuate in size. He was seen by an ENT in [place] who he states through his MRI disk in the garbage and told him it was just a swollen lymph node. States that doctor also told him he needed sinus surgery. Endorses nasal congestion R>L, rhinitis, facial pressure periorbitally/frontally, & epiphora. He does not take abx frequently for sinus infections but states he occasionally has to take IV abx for recurrent MRSA skin infections. Endorses fluctuant HL and tinnitus to his right ear which resolves briefly with auto-insufflation. Denies otorrhea. States he had recurrent ear infections when he was young but never had PE tubes or otologic surgery, that they got better as he grew up. He uses astelin prn when his nasal symptoms are severe.       9/17/24:  Here for follow-up.  Has been on nasal sprays for the past month without improvement in his symptoms.  Patient reports over the past couple of years he has had at least 20 sinus infections requiring antibiotics.  Patient also has trouble breathing when he is laying down.  Patient has never had sinus surgery.  Never been allergy tested.  Has tried nasal irrigations however he gets nasal vestibulitis very easily and avoids it  Patient also presents for evaluation of a right postauricular lymphadenopathy.  He reports it swells up when he gets sick.  Currently it is not swollen.  He also reports right-sided tinnitus.  Reports occasional hearing loss.  Had an audiogram today which does not show hearing loss.      10/30/24:  Still complains of right-sided ear pain.  MRI within normal limits.  Still complaining of constant nasal drainage worsened with eating food.  Also complains of nasal congestion.  No relief with nasal sprays.    12/13/24: Surgery: Septoplasty, bilateral FESS (anterior ethmoidectomy on right, total ethmoidectomy on left)    12/19/24: Here for follow up. Doing alright - has been in significant pain and had to go to the OR for bleeding. No bleeding over the last day or so. Feels at times like he is suffocating.     1/9/25: Patient presents for second postop follow up. Reports constant headache since surgery, requesting Ibuprofen 800 mg. Breathing well out of his nose. Using nasal saline irrigations daily and Atrovent BID before meals but still has a lot of mucus/crusting. Rhinorrhea while eating has not improved at all.       Review of patient's allergies indicates:   Allergen Reactions    Meropenem Anaphylaxis     Cardiac arrest     Pcn [penicillins] Hives    Topamax [topiramate] Hives       Past Medical History:   Diagnosis Date    Anxiety     Depression     Diabetes mellitus type I     Hyperkalemia 12/02/2022    Type 1 diabetes mellitus with diabetic polyneuropathy 05/23/2022       Past Surgical History:   Procedure Laterality Date    APPENDECTOMY      CHOLECYSTECTOMY      ETHMOIDECTOMY  12/13/2024    Procedure: ETHMOIDECTOMY;  Surgeon: Yohan Cartwright MD;  Location: [facility];  Service: ENT;;    FOOT SURGERY      bone spure and fracture    FUNCTIONAL ENDOSCOPIC SINUS SURGERY (FESS) Bilateral 12/13/2024    Procedure: ANTERIOR FESS, TURBINATE REDUCTION;  Surgeon: Yohan Cartwright MD;  Location: [facility] OR;  Service: ENT;  Laterality: Bilateral;  No Navigation    NASAL SEPTOPLASTY  12/13/2024    Procedure: SEPTOPLASTY, NOSE;  Surgeon: Yohan Cartwright MD;  Location: [facility] OR;  Service: ENT;;         Physical Exam:  Vitals:    01/09/25 0821 01/09/25 0823   BP: (!) 160/91 (!) 155/93   BP Location: Left  "arm Left arm   Patient Position: Sitting Sitting   Pulse: 84 89   Resp: 20    Temp: 97.6 °F (36.4 °C)    TempSrc: Oral    SpO2: 99%    Weight: 68.5 kg (151 lb)    Height: 5' 11" (1.803 m)      In mild distress secondary to pain   Cain splints in place, removed today  Nasal cavity with packing in place and with significant crusting and mucous. For this reason nasal endoscopy and debridement is indicated    Procedure: Nasal endoscopy    Sinonasal Endoscopy with Debridement: Due to the patient's chronic sinusitis/chronic rhinitis, sinonasal endoscopy with debridement is indicated.  After discussion of risks and benefits, and topical decongestion and anesthesia, an endoscope was used to perform nasal endoscopy with debridement. Patient refused suctioning and debridement today.     Surgeon: Mary Yanes MD  Preoperative diagnosis:   No diagnosis found.    Postoperative diagnosis: Same  Estimated blood loss: None  Complications: None  Anesthetic: None    Septum midline  Both inferior turbinates well reduced, still healing with some granulation tissue. No bleeding  On right, mild scarring of middle turbinate to lateral nasal wall.   On the left, middle turbinate was well medialized.      Pertinent Data:  No new data to review      Assessment/Plan: 38 y.o. male with flunctuant HL and tinnitus, AR, likely ETD, and chronic rhinosinusitis. He is now s/p anterior FESS & septoplasty on 12/13/24. He is healing well.     - Recommend follow up with PCP for diabetes control, as recent blood sugar exacerbation may be contributing to headaches and wound healing  - Continue nasal saline irrigation at least twice daily  - OK to restart Flonase and Astelin if needed; continue Atrovent for potential vasomotor rhinitis  - Will prescribe Ibuprofen 800 mg; instructed to alternate with Tylenol   - RTC 4 weeks for reevaluation and possible repeat debridement      Mary Yanes, PGY3  [employer]  1/9/2025 9:00 AM      "

## 2025-01-27 DIAGNOSIS — F90.9 ATTENTION DEFICIT HYPERACTIVITY DISORDER (ADHD), UNSPECIFIED ADHD TYPE: ICD-10-CM

## 2025-01-27 RX ORDER — DEXTROAMPHETAMINE SACCHARATE, AMPHETAMINE ASPARTATE, DEXTROAMPHETAMINE SULFATE AND AMPHETAMINE SULFATE 5; 5; 5; 5 MG/1; MG/1; MG/1; MG/1
1 TABLET ORAL 2 TIMES DAILY
Qty: 60 TABLET | Refills: 0 | Status: SHIPPED | OUTPATIENT
Start: 2025-01-27

## 2025-02-26 ENCOUNTER — OFFICE VISIT (OUTPATIENT)
Dept: FAMILY MEDICINE | Facility: CLINIC | Age: 40
End: 2025-02-26
Payer: MEDICAID

## 2025-02-26 VITALS
TEMPERATURE: 99 F | BODY MASS INDEX: 23.8 KG/M2 | RESPIRATION RATE: 18 BRPM | SYSTOLIC BLOOD PRESSURE: 119 MMHG | HEART RATE: 87 BPM | HEIGHT: 71 IN | WEIGHT: 170 LBS | DIASTOLIC BLOOD PRESSURE: 83 MMHG | OXYGEN SATURATION: 98 %

## 2025-02-26 DIAGNOSIS — L02.01 FACIAL ABSCESS: Primary | ICD-10-CM

## 2025-02-26 DIAGNOSIS — F90.9 ATTENTION DEFICIT HYPERACTIVITY DISORDER (ADHD), UNSPECIFIED ADHD TYPE: ICD-10-CM

## 2025-02-26 PROCEDURE — 99214 OFFICE O/P EST MOD 30 MIN: CPT | Mod: S$PBB,,, | Performed by: NURSE PRACTITIONER

## 2025-02-26 PROCEDURE — 1160F RVW MEDS BY RX/DR IN RCRD: CPT | Mod: CPTII,,, | Performed by: NURSE PRACTITIONER

## 2025-02-26 PROCEDURE — 3008F BODY MASS INDEX DOCD: CPT | Mod: CPTII,,, | Performed by: NURSE PRACTITIONER

## 2025-02-26 PROCEDURE — 3079F DIAST BP 80-89 MM HG: CPT | Mod: CPTII,,, | Performed by: NURSE PRACTITIONER

## 2025-02-26 PROCEDURE — 1159F MED LIST DOCD IN RCRD: CPT | Mod: CPTII,,, | Performed by: NURSE PRACTITIONER

## 2025-02-26 PROCEDURE — 99214 OFFICE O/P EST MOD 30 MIN: CPT | Mod: PBBFAC,PN | Performed by: NURSE PRACTITIONER

## 2025-02-26 PROCEDURE — 3074F SYST BP LT 130 MM HG: CPT | Mod: CPTII,,, | Performed by: NURSE PRACTITIONER

## 2025-02-26 RX ORDER — MUPIROCIN 20 MG/G
OINTMENT TOPICAL 3 TIMES DAILY
Qty: 22 G | Refills: 11 | Status: SHIPPED | OUTPATIENT
Start: 2025-02-26

## 2025-02-26 RX ORDER — DEXTROAMPHETAMINE SACCHARATE, AMPHETAMINE ASPARTATE, DEXTROAMPHETAMINE SULFATE AND AMPHETAMINE SULFATE 5; 5; 5; 5 MG/1; MG/1; MG/1; MG/1
1 TABLET ORAL 2 TIMES DAILY
Qty: 60 TABLET | Refills: 0 | Status: SHIPPED | OUTPATIENT
Start: 2025-02-26

## 2025-02-26 RX ORDER — DOXYCYCLINE HYCLATE 100 MG
100 TABLET ORAL 2 TIMES DAILY
Qty: 20 TABLET | Refills: 0 | Status: SHIPPED | OUTPATIENT
Start: 2025-02-26 | End: 2025-02-28

## 2025-02-26 NOTE — PATIENT INSTRUCTIONS
Pankaj Cruz,     If you are due for any health screening(s) below please notify me so we can arrange them to be ordered and scheduled. Most healthy patients at your age complete them, but you are free to accept or refuse.     If you can't do it, I'll definitely understand. If you can, I'd certainly appreciate it!    All of your core healthy metrics are met.

## 2025-02-26 NOTE — ASSESSMENT & PLAN NOTE
Doxycycline 100 mg po BID x 10 days  Mupirocin ointment BID x 5 days  Moist heat to face TID  ER Precautions given to patient to report if this gets worse on antibiotics.

## 2025-02-26 NOTE — PROGRESS NOTES
Patient Name: Anthony Hadley     : 1985    MRN: [id number]     Subjective:     Patient ID: Anthony Hadley is a 39 y.o. male.    Chief Complaint:   Chief Complaint   Patient presents with    Follow-up     Pt is here with c/o facial lesion x two days        HPI: 25: Pt is here with c/o facial lesion x two days.  States he stuck his face with tack strip from carpet he was throwing away.  +facial swelling with drainage noted.  +pain to right side of face. No dental involvement or ear involvement.            ROS:      Review of Systems   Skin:         +facial swelling right side of face           History:     Past Medical History:   Diagnosis Date    Anxiety     Depression     Diabetes mellitus type I     Hyperkalemia 2022    Type 1 diabetes mellitus with diabetic polyneuropathy 2022        Past Surgical History:   Procedure Laterality Date    APPENDECTOMY      CHOLECYSTECTOMY      ETHMOIDECTOMY  2024    Procedure: ETHMOIDECTOMY;  Surgeon: Yohan Cartwright MD;  Location: [facility];  Service: ENT;;    FOOT SURGERY      bone spure and fracture    FUNCTIONAL ENDOSCOPIC SINUS SURGERY (FESS) Bilateral 2024    Procedure: ANTERIOR FESS, TURBINATE REDUCTION;  Surgeon: Yohan Cartwright MD;  Location: [facility];  Service: ENT;  Laterality: Bilateral;  No Navigation    NASAL SEPTOPLASTY  2024    Procedure: SEPTOPLASTY, NOSE;  Surgeon: Yohan Cartwright MD;  Location: [facility];  Service: ENT;;       Family History   Problem Relation Name Age of Onset    Heart attack Mother      Heart disease Father      Diabetes Father      Crohn's disease Father          Social History     Tobacco Use    Smoking status: Former     Current packs/day: 0.00     Types: Cigarettes     Quit date: 2009     Years since quittin.0    Smokeless tobacco: Never    Tobacco comments:     Medical weed     Pt states stopped smoking cigs in years (15 years)    Substance and Sexual Activity    Alcohol use: Not Currently      "Comment: socially    Drug use: Yes     Types: Marijuana     Comment: medically prescribed to prevent nausea    Sexual activity: Yes       Current Outpatient Medications   Medication Instructions    clindamycin (CLEOCIN T) 1 % Swab Topical (Top), Daily    dextroamphetamine-amphetamine (ADDERALL) 20 mg tablet 1 tablet, Oral, 2 times daily    doxycycline (VIBRA-TABS) 100 mg, Oral, 2 times daily    insulin lispro protamin-lispro (HUMALOG 50/50) 100 unit/mL (50-50) Susp 100 Units, Subcutaneous, Daily    lisinopriL (PRINIVIL,ZESTRIL) 5 mg, Oral, Daily    metoclopramide HCl (REGLAN) 5 mg, Oral, 4 times daily    mupirocin (BACTROBAN) 2 % ointment Topical (Top), 3 times daily    NON FORMULARY MEDICATION Pt states uses Medical Marijuana as needed    ondansetron (ZOFRAN-ODT) 8 mg, Oral, 2 times daily    paroxetine (PAXIL) 20 mg, Oral, Every morning    rosuvastatin (CRESTOR) 5 mg, Oral, Daily        Review of patient's allergies indicates:   Allergen Reactions    Meropenem Anaphylaxis     Cardiac arrest     Pcn [penicillins] Hives    Topamax [topiramate] Hives       Objective:     Visit Vitals  /83 (BP Location: Left arm, Patient Position: Sitting)   Pulse 87   Temp 98.6 °F (37 °C) (Oral)   Resp 18   Ht 5' 11" (1.803 m)   Wt 77.1 kg (170 lb)   SpO2 98%   BMI 23.71 kg/m²       Physical Examination:     Physical Exam  Vitals reviewed.   Constitutional:       Appearance: Normal appearance. He is normal weight.   HENT:      Head: Normocephalic.   Cardiovascular:      Rate and Rhythm: Normal rate and regular rhythm.      Pulses: Normal pulses.      Heart sounds: Normal heart sounds.   Pulmonary:      Effort: Pulmonary effort is normal.      Breath sounds: Normal breath sounds.   Abdominal:      General: Abdomen is flat.      Palpations: Abdomen is soft.   Musculoskeletal:         General: Normal range of motion.      Cervical back: Normal range of motion.   Skin:     General: Skin is warm and dry.      Findings: Rash present. " Rash is nodular and pustular.      Comments: +1.5cm x 1.5 cm nodule with pustular center drainage. No flucutance noted, hard endurated lesion.    Neurological:      Mental Status: He is alert.   Psychiatric:         Mood and Affect: Mood normal.         Lab Results:     Chemistry:  Lab Results   Component Value Date     12/19/2024    K 4.0 12/19/2024    BUN 17.9 12/19/2024    CREATININE 1.13 12/19/2024    EGFRNORACEVR >60 12/19/2024    GLUCOSE 206 (H) 12/19/2024    CALCIUM 9.2 12/19/2024    ALKPHOS 67 12/19/2024    LABPROT 6.9 12/19/2024    ALBUMIN 3.1 (L) 12/19/2024    AST 23 12/19/2024    ALT 18 12/19/2024    RKSXHJIA34OM 17 (L) 10/28/2024    TSH 1.671 07/01/2024    IBGKKG0HRBD 1.13 07/01/2024        Lab Results   Component Value Date    HGBA1C 7.9 (H) 10/28/2024        Hematology:  Lab Results   Component Value Date    WBC 13.54 (H) 12/14/2024    HGB 11.1 (L) 12/14/2024    HCT 32.2 (L) 12/14/2024     12/14/2024       Lipid Panel:  Lab Results   Component Value Date    CHOL 183 07/01/2024    HDL 49 07/01/2024    .00 07/01/2024    TRIG 115 07/01/2024    TOTALCHOLEST 4 07/01/2024        Urine:  Lab Results   Component Value Date    APPEARANCEUA Clear 07/01/2024    SGUA 1.023 07/01/2024    PROTEINUA 2+ (A) 07/01/2024    KETONESUA Negative 07/01/2024    LEUKOCYTESUR Negative 07/01/2024    RBCUA 0-5 07/01/2024    WBCUA 0-5 07/01/2024    BACTERIA Moderate (A) 07/01/2024    SQEPUA Trace (A) 07/01/2024    HYALINECASTS None Seen 07/01/2024    CREATRANDUR 230.0 (H) 07/01/2024    PROTEINURINE 61.5 02/04/2024        Assessment:          ICD-10-CM ICD-9-CM   1. Facial abscess  L02.01 682.0   2. Attention deficit hyperactivity disorder (ADHD), unspecified ADHD type  F90.9 314.01          Plan:     1. Facial abscess  Assessment & Plan:  Doxycycline 100 mg po BID x 10 days  Mupirocin ointment BID x 5 days  Moist heat to face TID  ER Precautions given to patient to report if this gets worse on antibiotics.      Orders:  -     mupirocin (BACTROBAN) 2 % ointment; Apply topically 3 (three) times daily.  Dispense: 22 g; Refill: 11  -     doxycycline (VIBRA-TABS) 100 MG tablet; Take 1 tablet (100 mg total) by mouth 2 (two) times daily. for 10 days  Dispense: 20 tablet; Refill: 0    2. Attention deficit hyperactivity disorder (ADHD), unspecified ADHD type  Assessment & Plan:  Adderall refilled.     Orders:  -     dextroamphetamine-amphetamine (ADDERALL) 20 mg tablet; Take 1 tablet by mouth 2 (two) times a day.  Dispense: 60 tablet; Refill: 0           Follow up in about 4 months (around 6/19/2025)..  In addition to their scheduled follow up, the patient has also been instructed to follow up on as needed basis.       Future Appointments   Date Time Provider Department Center   3/17/2025  2:30 PM [facility]    3/17/2025  3:00 PM Darya Narayanan PA-C [facility]   6/19/2025  8:20 AM Nasrin Arambula FNP [facility]        PUNE[place] Lo

## 2025-02-28 ENCOUNTER — TELEPHONE (OUTPATIENT)
Dept: FAMILY MEDICINE | Facility: CLINIC | Age: 40
End: 2025-02-28
Payer: MEDICAID

## 2025-02-28 ENCOUNTER — HOSPITAL ENCOUNTER (EMERGENCY)
Facility: HOSPITAL | Age: 40
Discharge: HOME | End: 2025-02-28
Attending: EMERGENCY MEDICINE
Payer: MEDICAID

## 2025-02-28 VITALS
DIASTOLIC BLOOD PRESSURE: 92 MMHG | OXYGEN SATURATION: 99 % | SYSTOLIC BLOOD PRESSURE: 148 MMHG | BODY MASS INDEX: 23.01 KG/M2 | TEMPERATURE: 98 F | WEIGHT: 165 LBS | HEART RATE: 69 BPM | RESPIRATION RATE: 18 BRPM

## 2025-02-28 DIAGNOSIS — L03.211 CELLULITIS OF FACE: Primary | ICD-10-CM

## 2025-02-28 LAB
ALBUMIN SERPL-MCNC: 3 G/DL (ref 3.5–5)
ALBUMIN/GLOB SERPL: 0.8 RATIO (ref 1.1–2)
ALP SERPL-CCNC: 71 UNIT/L (ref 40–150)
ALT SERPL-CCNC: 11 UNIT/L (ref 0–55)
ANION GAP SERPL CALC-SCNC: 6 MEQ/L
AST SERPL-CCNC: 16 UNIT/L (ref 5–34)
BASOPHILS # BLD AUTO: 0.12 X10(3)/MCL
BASOPHILS NFR BLD AUTO: 1.7 %
BILIRUB SERPL-MCNC: 0.3 MG/DL
BUN SERPL-MCNC: 21.7 MG/DL (ref 8.9–20.6)
CALCIUM SERPL-MCNC: 9.6 MG/DL (ref 8.4–10.2)
CHLORIDE SERPL-SCNC: 107 MMOL/L (ref 98–107)
CO2 SERPL-SCNC: 26 MMOL/L (ref 22–29)
CREAT SERPL-MCNC: 0.96 MG/DL (ref 0.72–1.25)
CREAT/UREA NIT SERPL: 23
EOSINOPHIL # BLD AUTO: 0.42 X10(3)/MCL (ref 0–0.9)
EOSINOPHIL NFR BLD AUTO: 5.9 %
ERYTHROCYTE [DISTWIDTH] IN BLOOD BY AUTOMATED COUNT: 12.5 % (ref 11.5–17)
GFR SERPLBLD CREATININE-BSD FMLA CKD-EPI: >60 ML/MIN/1.73/M2
GLOBULIN SER-MCNC: 4 GM/DL (ref 2.4–3.5)
GLUCOSE SERPL-MCNC: 153 MG/DL (ref 74–100)
HCT VFR BLD AUTO: 37.9 % (ref 42–52)
HGB BLD-MCNC: 12.7 G/DL (ref 14–18)
HOLD SPECIMEN: NORMAL
IMM GRANULOCYTES # BLD AUTO: 0.03 X10(3)/MCL (ref 0–0.04)
IMM GRANULOCYTES NFR BLD AUTO: 0.4 %
LYMPHOCYTES # BLD AUTO: 2.32 X10(3)/MCL (ref 0.6–4.6)
LYMPHOCYTES NFR BLD AUTO: 32.4 %
MCH RBC QN AUTO: 29.1 PG (ref 27–31)
MCHC RBC AUTO-ENTMCNC: 33.5 G/DL (ref 33–36)
MCV RBC AUTO: 86.7 FL (ref 80–94)
MONOCYTES # BLD AUTO: 0.58 X10(3)/MCL (ref 0.1–1.3)
MONOCYTES NFR BLD AUTO: 8.1 %
NEUTROPHILS # BLD AUTO: 3.69 X10(3)/MCL (ref 2.1–9.2)
NEUTROPHILS NFR BLD AUTO: 51.5 %
NRBC BLD AUTO-RTO: 0 %
PLATELET # BLD AUTO: 298 X10(3)/MCL (ref 130–400)
PMV BLD AUTO: 11.2 FL (ref 7.4–10.4)
POTASSIUM SERPL-SCNC: 4.6 MMOL/L (ref 3.5–5.1)
PROT SERPL-MCNC: 7 GM/DL (ref 6.4–8.3)
RBC # BLD AUTO: 4.37 X10(6)/MCL (ref 4.7–6.1)
SODIUM SERPL-SCNC: 139 MMOL/L (ref 136–145)
WBC # BLD AUTO: 7.16 X10(3)/MCL (ref 4.5–11.5)

## 2025-02-28 PROCEDURE — 99285 EMERGENCY DEPT VISIT HI MDM: CPT | Mod: 25

## 2025-02-28 PROCEDURE — 25500020 PHARM REV CODE 255

## 2025-02-28 PROCEDURE — 36415 COLL VENOUS BLD VENIPUNCTURE: CPT

## 2025-02-28 PROCEDURE — 85025 COMPLETE CBC W/AUTO DIFF WBC: CPT

## 2025-02-28 PROCEDURE — 80053 COMPREHEN METABOLIC PANEL: CPT

## 2025-02-28 PROCEDURE — 63600175 PHARM REV CODE 636 W HCPCS: Mod: JZ,TB

## 2025-02-28 PROCEDURE — 96374 THER/PROPH/DIAG INJ IV PUSH: CPT

## 2025-02-28 RX ORDER — KETOROLAC TROMETHAMINE 30 MG/ML
30 INJECTION, SOLUTION INTRAMUSCULAR; INTRAVENOUS
Status: COMPLETED | OUTPATIENT
Start: 2025-02-28 | End: 2025-02-28

## 2025-02-28 RX ORDER — KETOROLAC TROMETHAMINE 30 MG/ML
30 INJECTION, SOLUTION INTRAMUSCULAR; INTRAVENOUS
Status: DISCONTINUED | OUTPATIENT
Start: 2025-02-28 | End: 2025-02-28

## 2025-02-28 RX ORDER — IBUPROFEN 800 MG/1
800 TABLET ORAL EVERY 6 HOURS PRN
Qty: 20 TABLET | Refills: 0 | Status: SHIPPED | OUTPATIENT
Start: 2025-02-28

## 2025-02-28 RX ORDER — SULFAMETHOXAZOLE AND TRIMETHOPRIM 800; 160 MG/1; MG/1
1 TABLET ORAL 2 TIMES DAILY
Qty: 14 TABLET | Refills: 0 | Status: SHIPPED | OUTPATIENT
Start: 2025-02-28 | End: 2025-03-07

## 2025-02-28 RX ADMIN — IOHEXOL 100 ML: 350 INJECTION, SOLUTION INTRAVENOUS at 02:02

## 2025-02-28 RX ADMIN — KETOROLAC TROMETHAMINE 30 MG: 30 INJECTION, SOLUTION INTRAMUSCULAR; INTRAVENOUS at 03:02
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
## 2025-02-28 NOTE — TELEPHONE ENCOUNTER
----- Message from Romelia sent at 2/28/2025  8:34 AM CST -----  .Who Called: Anthony HadleyPatient is returning phone callWho Left Message for Patient:PT Does the patient know what this is regarding?:Pt stated face is still swollen even after antibiotics and would like to do virtual visit today if possible(looked in system, nothing came up for me) pls call pt back Preferred Method of Contact: Phone CallPatient's Preferred Phone Number on File: [phone] Best Call Back Number, if different:Additional Information: Pt stated face is still swollen aeven after antibiotics and would like to do virtual visit today if possible(looked in system, nothing came up for me) pls call pt back

## 2025-02-28 NOTE — Clinical Note
"Anthony"Fabiola Hadley was seen and treated in our emergency department on 2/28/2025.  He may return to work on 03/03/2025.       If you have any questions or concerns, please don't hesitate to call.      Mahad Ruiz MD"

## 2025-03-17 ENCOUNTER — OFFICE VISIT (OUTPATIENT)
Dept: ENDOCRINOLOGY | Facility: CLINIC | Age: 40
End: 2025-03-17
Payer: MEDICAID

## 2025-03-17 VITALS
WEIGHT: 170.81 LBS | HEIGHT: 71 IN | DIASTOLIC BLOOD PRESSURE: 75 MMHG | HEART RATE: 102 BPM | BODY MASS INDEX: 23.91 KG/M2 | OXYGEN SATURATION: 98 % | RESPIRATION RATE: 18 BRPM | SYSTOLIC BLOOD PRESSURE: 131 MMHG

## 2025-03-17 DIAGNOSIS — R80.9 MICROALBUMINURIA: ICD-10-CM

## 2025-03-17 DIAGNOSIS — E10.65 TYPE 1 DIABETES MELLITUS WITH HYPERGLYCEMIA: Primary | ICD-10-CM

## 2025-03-17 DIAGNOSIS — I15.8 OTHER SECONDARY HYPERTENSION: ICD-10-CM

## 2025-03-17 PROCEDURE — G2211 COMPLEX E/M VISIT ADD ON: HCPCS | Mod: S$PBB,,, | Performed by: PHYSICIAN ASSISTANT

## 2025-03-17 PROCEDURE — 99999 PR PBB SHADOW E&M-EST. PATIENT-LVL III: CPT | Mod: PBBFAC,,, | Performed by: PHYSICIAN ASSISTANT

## 2025-03-17 PROCEDURE — 4010F ACE/ARB THERAPY RXD/TAKEN: CPT | Mod: CPTII,,, | Performed by: PHYSICIAN ASSISTANT

## 2025-03-17 PROCEDURE — 1159F MED LIST DOCD IN RCRD: CPT | Mod: CPTII,,, | Performed by: PHYSICIAN ASSISTANT

## 2025-03-17 PROCEDURE — 95251 CONT GLUC MNTR ANALYSIS I&R: CPT | Mod: ,,, | Performed by: PHYSICIAN ASSISTANT

## 2025-03-17 PROCEDURE — 99213 OFFICE O/P EST LOW 20 MIN: CPT | Mod: PBBFAC | Performed by: PHYSICIAN ASSISTANT

## 2025-03-17 PROCEDURE — 3075F SYST BP GE 130 - 139MM HG: CPT | Mod: CPTII,,, | Performed by: PHYSICIAN ASSISTANT

## 2025-03-17 PROCEDURE — 99215 OFFICE O/P EST HI 40 MIN: CPT | Mod: S$PBB,,, | Performed by: PHYSICIAN ASSISTANT

## 2025-03-17 PROCEDURE — 3008F BODY MASS INDEX DOCD: CPT | Mod: CPTII,,, | Performed by: PHYSICIAN ASSISTANT

## 2025-03-17 PROCEDURE — 3078F DIAST BP <80 MM HG: CPT | Mod: CPTII,,, | Performed by: PHYSICIAN ASSISTANT

## 2025-03-17 RX ORDER — INSULIN LISPRO 100 [IU]/ML
INJECTION, SOLUTION INTRAVENOUS; SUBCUTANEOUS
Qty: 30 ML | Refills: 11 | Status: SHIPPED | OUTPATIENT
Start: 2025-03-17

## 2025-03-17 RX ORDER — LISINOPRIL 5 MG/1
5 TABLET ORAL DAILY
Qty: 90 TABLET | Refills: 3 | Status: SHIPPED | OUTPATIENT
Start: 2025-03-17 | End: 2026-03-17

## 2025-03-17 RX ORDER — INSULIN LISPRO 100 [IU]/ML
INJECTION, SOLUTION INTRAVENOUS; SUBCUTANEOUS
COMMUNITY
Start: 2025-02-10

## 2025-03-17 NOTE — PROGRESS NOTES
Subjective:      Patient ID: Anthony Hadley is a 39 y.o. male.    Chief Complaint:  T1DM    History of Present Illness  This is a 39 y.o. male. with a past medical history of Type 1 DM on insulin pump, heart disease, ADHD, Anxiety/Depression here for evaluation of T1DM.    Type 1 diabetes mellitus  Diagnosed around age 24    Current diabetes medications:  - Humalog via Tandem T-slim with Control IQ   Basal: 1 u/hr   ICR: 20   ISF: 75   Target: 180   Correction: 110   Duration of action: 5 hours    TDD: 41 U/day    - Dexcom G7 (phone)     Past diabetes medications:  - Metformin     Lab Results   Component Value Date    CREATININE 0.96 02/28/2025    EGFRNORACEVR >60 02/28/2025     Known diabetic complications: retinopathy, peripheral neuropathy, cardiovascular disease, and gastroparesis    Weight trend:  Wt Readings from Last 8 Encounters:   03/17/25 77.5 kg (170 lb 12.8 oz)   02/28/25 74.8 kg (165 lb)   02/26/25 77.1 kg (170 lb)   01/09/25 68.5 kg (151 lb)   12/19/24 68.5 kg (151 lb 0.2 oz)   12/19/24 68.5 kg (151 lb)   12/14/24 72.4 kg (159 lb 11.6 oz)   12/13/24 72.7 kg (160 lb 3.2 oz)       Family history of diabetes:  Father Type 1 and Type 2; Paternal side     Prior visit with diabetes education: Yes    Current diet: 3 meals per day  Current exercise: Active at work;  work    Blood glucose monitoring at home:          Glucagon: Yes    Diabetes Management Status  Statin: Taking  ACE/ARB: Not taking    Screening or Prevention Patient's value   HgA1C Testing and Control   Lab Results   Component Value Date    HGBA1C 7.9 (H) 10/28/2024        LDL control Lab Results   Component Value Date    LDLCALC 87 01/17/2023      Nephropathy screening Lab Results   Component Value Date    MICALBCREAT 429.3 (H) 07/01/2024        Lab Results   Component Value Date    TSH 1.671 07/01/2024       Last eye exam: : 11/15/2024      Review of Systems  As above    Social and family history reviewed  Current medications and allergies  "reviewed    Objective:   /75   Pulse 102   Resp 18   Ht 5' 11" (1.803 m)   Wt 77.5 kg (170 lb 12.8 oz)   SpO2 98%   BMI 23.82 kg/m²   Physical Exam  Alert, oriented    BP Readings from Last 1 Encounters:   03/17/25 131/75      Wt Readings from Last 1 Encounters:   03/17/25 1515 77.5 kg (170 lb 12.8 oz)     Body mass index is 23.82 kg/m².    Lab Review:   Lab Results   Component Value Date    HGBA1C 7.9 (H) 10/28/2024     Lab Results   Component Value Date    CHOL 183 07/01/2024    HDL 49 07/01/2024    LDLCALC 87 01/17/2023    TRIG 115 07/01/2024     Lab Results   Component Value Date     02/28/2025    K 4.6 02/28/2025     02/28/2025    CO2 26 02/28/2025    BUN 21.7 (H) 02/28/2025    CREATININE 0.96 02/28/2025    CALCIUM 9.6 02/28/2025    ALBUMIN 3.0 (L) 02/28/2025    BILITOT 0.3 02/28/2025    ALKPHOS 71 02/28/2025    AST 16 02/28/2025    ALT 11 02/28/2025    ANIONGAP 8 06/09/2024    ESTGFRAFRICA 102 06/09/2024    TSH 1.671 07/01/2024       All pertinent labs reviewed    Assessment and Plan     Type 1 diabetes mellitus with hyperglycemia  Glucose uncontrolled with TIR 42%. He rarely puts in carb boluses with meals and if he does, boluses are late. TDD is 41. Carb ratio should be more around 11, but he is not wanting to adjust. He states when he was using the 6 mm cannula, his TIR was 75% with current settings. He is requesting that we switch orders to 6 mm cannula first and if glucose is still uncontrolled, he is open to adjusting settings. I explained to patient how uncontrolled DM can lead to a variety of medical complications, and I believe adjusting settings would benefit him, but he is of sound mind and can make his own decisions. Will re-evaluate once he gets 6 mm cannula.     Per patient, he has episodes of lightheadedness, nausea, vomiting, and sweating that occur when his blood sugar is normal. He does have hx of gastroparesis. Friend in clinic is requesting hormone panel. Labs " ordered     Plan:   - Continue Humalog with current settings   Basal: 1 u/hr   ICR: 20   ISF: 75   Target: 180   Duration of action: 5 hours     Microalbuminuria  He has not been taking ACE/ARB. Encouraged compliance.    Other secondary hypertension  He has not been taking ACEi/ARB. Encouraged compliance.     Follow-up 1 month     Darya Narayanan PA-C  Endocrinology     40 minutes of total time spent on the encounter, which includes face to face time and non-face to face time preparing to see the patient (e.g., review of tests), obtaining and/or reviewing separately obtained history, documenting clinical information in the electronic or other health record, independently interpreting results (not separately reported) and communicating results to the patient/family/caregiver, or care coordination (not separately reported).

## 2025-03-17 NOTE — ASSESSMENT & PLAN NOTE
Glucose uncontrolled with TIR 42%. He rarely puts in carb boluses with meals and if he does, boluses are late. TDD is 41. Carb ratio should be more around 11, but he is not wanting to adjust. He states when he was using the 6 mm cannula, his TIR was 75% with current settings. He is requesting that we switch orders to 6 mm cannula first and if glucose is still uncontrolled, he is open to adjusting settings. I explained to patient how uncontrolled DM can lead to a variety of medical complications, and I believe adjusting settings would benefit him, but he is of sound mind and can make his own decisions. Will re-evaluate once he gets 6 mm cannula.     Per patient, he has episodes of lightheadedness, nausea, vomiting, and sweating that occur when his blood sugar is normal. He does have hx of gastroparesis. Friend in clinic is requesting hormone panel. Labs ordered     Plan:   - Continue Humalog with current settings   Basal: 1 u/hr   ICR: 20   ISF: 75   Target: 180   Duration of action: 5 hours

## 2025-03-18 ENCOUNTER — PATIENT MESSAGE (OUTPATIENT)
Dept: ENDOCRINOLOGY | Facility: CLINIC | Age: 40
End: 2025-03-18
Payer: MEDICAID

## 2025-03-18 DIAGNOSIS — E10.65 TYPE 1 DIABETES MELLITUS WITH HYPERGLYCEMIA: Primary | ICD-10-CM

## 2025-03-24 DIAGNOSIS — F90.9 ATTENTION DEFICIT HYPERACTIVITY DISORDER (ADHD), UNSPECIFIED ADHD TYPE: ICD-10-CM

## 2025-03-25 ENCOUNTER — PATIENT MESSAGE (OUTPATIENT)
Dept: ENDOCRINOLOGY | Facility: CLINIC | Age: 40
End: 2025-03-25
Payer: MEDICAID

## 2025-03-25 ENCOUNTER — RESULTS FOLLOW-UP (OUTPATIENT)
Dept: ENDOCRINOLOGY | Facility: CLINIC | Age: 40
End: 2025-03-25

## 2025-03-25 ENCOUNTER — LAB VISIT (OUTPATIENT)
Dept: LAB | Facility: HOSPITAL | Age: 40
End: 2025-03-25
Attending: PHYSICIAN ASSISTANT
Payer: MEDICAID

## 2025-03-25 DIAGNOSIS — J32.4 CHRONIC PANSINUSITIS: ICD-10-CM

## 2025-03-25 DIAGNOSIS — E10.65 TYPE 1 DIABETES MELLITUS WITH HYPERGLYCEMIA: ICD-10-CM

## 2025-03-25 DIAGNOSIS — R80.9 MICROALBUMINURIA: ICD-10-CM

## 2025-03-25 DIAGNOSIS — H93.11 TINNITUS OF RIGHT EAR: ICD-10-CM

## 2025-03-25 LAB
25(OH)D3+25(OH)D2 SERPL-MCNC: 24 NG/ML (ref 30–80)
ALBUMIN SERPL-MCNC: 3.3 G/DL (ref 3.5–5)
ALBUMIN/GLOB SERPL: 0.9 RATIO (ref 1.1–2)
ALP SERPL-CCNC: 54 UNIT/L (ref 40–150)
ALT SERPL-CCNC: 16 UNIT/L (ref 0–55)
ANION GAP SERPL CALC-SCNC: 5 MEQ/L
AST SERPL-CCNC: 21 UNIT/L (ref 11–45)
BILIRUB SERPL-MCNC: 0.2 MG/DL
BUN SERPL-MCNC: 27.8 MG/DL (ref 8.9–20.6)
CALCIUM SERPL-MCNC: 8.5 MG/DL (ref 8.4–10.2)
CHLORIDE SERPL-SCNC: 109 MMOL/L (ref 98–107)
CHOLEST SERPL-MCNC: 192 MG/DL
CHOLEST/HDLC SERPL: 3 {RATIO} (ref 0–5)
CO2 SERPL-SCNC: 29 MMOL/L (ref 22–29)
CORTIS SERPL-SCNC: 8.2 UG/DL
CREAT SERPL-MCNC: 1.21 MG/DL (ref 0.72–1.25)
CREAT UR-MCNC: 138.4 MG/DL (ref 63–166)
CREAT/UREA NIT SERPL: 23
EST. AVERAGE GLUCOSE BLD GHB EST-MCNC: 174.3 MG/DL
GFR SERPLBLD CREATININE-BSD FMLA CKD-EPI: >60 ML/MIN/1.73/M2
GLOBULIN SER-MCNC: 3.7 GM/DL (ref 2.4–3.5)
GLUCOSE SERPL-MCNC: 91 MG/DL (ref 74–100)
HBA1C MFR BLD: 7.7 %
HDLC SERPL-MCNC: 63 MG/DL (ref 35–60)
LDLC SERPL CALC-MCNC: 114 MG/DL (ref 50–140)
MICROALBUMIN UR-MCNC: 605 UG/ML
MICROALBUMIN/CREAT RATIO PNL UR: 437.1 MG/GM CR (ref 0–30)
POTASSIUM SERPL-SCNC: 5 MMOL/L (ref 3.5–5.1)
PROT SERPL-MCNC: 7 GM/DL (ref 6.4–8.3)
SODIUM SERPL-SCNC: 143 MMOL/L (ref 136–145)
TESTOST SERPL-MCNC: 952.31 NG/DL (ref 240.24–870.68)
TRIGL SERPL-MCNC: 77 MG/DL (ref 34–140)
TSH SERPL-ACNC: 2.16 UIU/ML (ref 0.35–4.94)
VLDLC SERPL CALC-MCNC: 15 MG/DL

## 2025-03-25 PROCEDURE — 86008 ALLG SPEC IGE RECOMB EA: CPT

## 2025-03-25 PROCEDURE — 86003 ALLG SPEC IGE CRUDE XTRC EA: CPT

## 2025-03-25 PROCEDURE — 83036 HEMOGLOBIN GLYCOSYLATED A1C: CPT

## 2025-03-25 PROCEDURE — 80061 LIPID PANEL: CPT

## 2025-03-25 PROCEDURE — 82306 VITAMIN D 25 HYDROXY: CPT

## 2025-03-25 PROCEDURE — 84443 ASSAY THYROID STIM HORMONE: CPT

## 2025-03-25 PROCEDURE — 84403 ASSAY OF TOTAL TESTOSTERONE: CPT

## 2025-03-25 PROCEDURE — 36415 COLL VENOUS BLD VENIPUNCTURE: CPT

## 2025-03-25 PROCEDURE — 80053 COMPREHEN METABOLIC PANEL: CPT

## 2025-03-25 PROCEDURE — 82570 ASSAY OF URINE CREATININE: CPT

## 2025-03-25 PROCEDURE — 82533 TOTAL CORTISOL: CPT

## 2025-03-25 RX ORDER — DEXTROAMPHETAMINE SACCHARATE, AMPHETAMINE ASPARTATE, DEXTROAMPHETAMINE SULFATE AND AMPHETAMINE SULFATE 5; 5; 5; 5 MG/1; MG/1; MG/1; MG/1
1 TABLET ORAL 2 TIMES DAILY
Qty: 60 TABLET | Refills: 0 | Status: SHIPPED | OUTPATIENT
Start: 2025-03-25

## 2025-03-26 LAB
ALLERGEN ALTERNARIA ALTERNATA IGE (OLG): 0.54 KUA/L
ALLERGEN ASPERGILLUS FUMIGATUS IGE (OLG): <0.1 KUA/L
ALLERGEN BERMUDA GRASS IGE (OLG): 0.99 KUA/L
ALLERGEN BOXELDER MAPLE TREE IGE (OLG): <0.1 KUA/L
ALLERGEN CAT DANDER IGE (OLG): <0.1 KUA/L
ALLERGEN CLADOSPORIUM HERBARUM IGE (OLG): <0.1 KUA/L
ALLERGEN COCKROACH GERMAN IGE (OLG): 0.48 KUA/L
ALLERGEN COMMON RAGWEED IGE (OLG): 0.17 KUA/L
ALLERGEN DOG DANDER IGE (OLG): 0.31 KUA/L
ALLERGEN DUST MITE (D. PTERONYSSINUS) IGE (OLG): <0.1 KUA/L
ALLERGEN DUST MITE (D.FARINAE) IGE (OLG): <0.1 KUA/L
ALLERGEN ELM TREE IGE (OLG): <0.1 KUA/L
ALLERGEN HORSE DANDER IGE (OLG): <0.1 KUA/L
ALLERGEN MOUNTAIN JUNIPER TREE IGE (OLG): <0.1 KUA/L
ALLERGEN MOUSE URINE PROTEINS IGE (OLG): <0.1 KUA/L
ALLERGEN MULBERRY TREE IGE (OLG): <0.1 KUA/L
ALLERGEN OAK TREE IGE (OLG): <0.1 KUA/L
ALLERGEN PECAN HICKORY TREE IGE (OLG): <0.1 KUA/L
ALLERGEN PENICILLIUM CHRYSOGENUM IGE (OLG): <0.1 KUA/L
ALLERGEN PIGWEED IGE (OLG): <0.1 KUA/L
ALLERGEN ROUGH MARSH ELDER IGE (OLG): <0.1 KUA/L
ALLERGEN SILVER BIRCH TREE IGE (OLG): <0.1 KUA/L
ALLERGEN TIMOTHY GRASS IGE (OLG): 9.86 KUA/L
ALLERGEN WALNUT TREE IGE (OLG): <0.1 KUA/L
PHADIOTOP IGE QN: 371 KU/L

## 2025-03-27 ENCOUNTER — HOSPITAL ENCOUNTER (OUTPATIENT)
Dept: RADIOLOGY | Facility: HOSPITAL | Age: 40
Discharge: HOME | End: 2025-03-27
Attending: NURSE PRACTITIONER
Payer: MEDICAID

## 2025-03-27 ENCOUNTER — OFFICE VISIT (OUTPATIENT)
Dept: FAMILY MEDICINE | Facility: CLINIC | Age: 40
End: 2025-03-27
Payer: MEDICAID

## 2025-03-27 VITALS
BODY MASS INDEX: 22.96 KG/M2 | TEMPERATURE: 98 F | RESPIRATION RATE: 18 BRPM | SYSTOLIC BLOOD PRESSURE: 125 MMHG | OXYGEN SATURATION: 98 % | DIASTOLIC BLOOD PRESSURE: 84 MMHG | WEIGHT: 164 LBS | HEART RATE: 95 BPM | HEIGHT: 71 IN

## 2025-03-27 DIAGNOSIS — R53.83 FATIGUE, UNSPECIFIED TYPE: ICD-10-CM

## 2025-03-27 DIAGNOSIS — R11.2 NAUSEA AND VOMITING, UNSPECIFIED VOMITING TYPE: ICD-10-CM

## 2025-03-27 DIAGNOSIS — E10.65 TYPE 1 DIABETES MELLITUS WITH HYPERGLYCEMIA: ICD-10-CM

## 2025-03-27 DIAGNOSIS — R06.01 ORTHOPNEA: ICD-10-CM

## 2025-03-27 DIAGNOSIS — K31.84 DIABETIC GASTROPARESIS: Primary | ICD-10-CM

## 2025-03-27 DIAGNOSIS — E11.43 DIABETIC GASTROPARESIS: Primary | ICD-10-CM

## 2025-03-27 DIAGNOSIS — R06.02 SHORTNESS OF BREATH: ICD-10-CM

## 2025-03-27 LAB
BASOPHILS # BLD AUTO: 0.15 X10(3)/MCL
BASOPHILS NFR BLD AUTO: 1.3 %
EOSINOPHIL # BLD AUTO: 0.37 X10(3)/MCL (ref 0–0.9)
EOSINOPHIL NFR BLD AUTO: 3.2 %
ERYTHROCYTE [DISTWIDTH] IN BLOOD BY AUTOMATED COUNT: 12.5 % (ref 11.5–17)
FERRITIN SERPL-MCNC: 19.66 NG/ML (ref 21.81–274.66)
HCT VFR BLD AUTO: 39.9 % (ref 42–52)
HGB BLD-MCNC: 13 G/DL (ref 14–18)
IMM GRANULOCYTES # BLD AUTO: 0.04 X10(3)/MCL (ref 0–0.04)
IMM GRANULOCYTES NFR BLD AUTO: 0.3 %
IRON SATN MFR SERPL: 25 % (ref 20–50)
IRON SERPL-MCNC: 79 UG/DL (ref 65–175)
LYMPHOCYTES # BLD AUTO: 2.71 X10(3)/MCL (ref 0.6–4.6)
LYMPHOCYTES NFR BLD AUTO: 23.4 %
MCH RBC QN AUTO: 29.4 PG (ref 27–31)
MCHC RBC AUTO-ENTMCNC: 32.6 G/DL (ref 33–36)
MCV RBC AUTO: 90.3 FL (ref 80–94)
MONOCYTES # BLD AUTO: 0.56 X10(3)/MCL (ref 0.1–1.3)
MONOCYTES NFR BLD AUTO: 4.8 %
NEUTROPHILS # BLD AUTO: 7.77 X10(3)/MCL (ref 2.1–9.2)
NEUTROPHILS NFR BLD AUTO: 67 %
NRBC BLD AUTO-RTO: 0 %
PLATELET # BLD AUTO: 259 X10(3)/MCL (ref 130–400)
PMV BLD AUTO: 11.4 FL (ref 7.4–10.4)
RBC # BLD AUTO: 4.42 X10(6)/MCL (ref 4.7–6.1)
RET# (OHS): 0.06 X10E6/UL (ref 0.03–0.1)
RETICULOCYTE COUNT AUTOMATED (OLG): 1.25 % (ref 1.1–2.1)
TIBC SERPL-MCNC: 243 UG/DL (ref 60–240)
TIBC SERPL-MCNC: 322 UG/DL (ref 250–450)
TRANSFERRIN SERPL-MCNC: 286 MG/DL (ref 174–364)
WBC # BLD AUTO: 11.6 X10(3)/MCL (ref 4.5–11.5)

## 2025-03-27 PROCEDURE — 85045 AUTOMATED RETICULOCYTE COUNT: CPT | Performed by: NURSE PRACTITIONER

## 2025-03-27 PROCEDURE — 82728 ASSAY OF FERRITIN: CPT | Performed by: NURSE PRACTITIONER

## 2025-03-27 PROCEDURE — 85025 COMPLETE CBC W/AUTO DIFF WBC: CPT | Performed by: NURSE PRACTITIONER

## 2025-03-27 PROCEDURE — 99215 OFFICE O/P EST HI 40 MIN: CPT | Mod: PBBFAC,25,PN | Performed by: NURSE PRACTITIONER

## 2025-03-27 PROCEDURE — 83550 IRON BINDING TEST: CPT | Performed by: NURSE PRACTITIONER

## 2025-03-27 PROCEDURE — 36415 COLL VENOUS BLD VENIPUNCTURE: CPT | Performed by: NURSE PRACTITIONER

## 2025-03-27 PROCEDURE — 74019 RADEX ABDOMEN 2 VIEWS: CPT | Mod: TC,PN

## 2025-03-27 RX ORDER — FLUTICASONE PROPIONATE 50 MCG
1 SPRAY, SUSPENSION (ML) NASAL DAILY
Qty: 18.2 ML | Refills: 3 | Status: SHIPPED | OUTPATIENT
Start: 2025-03-27

## 2025-03-27 RX ORDER — LORATADINE 10 MG/1
10 TABLET ORAL DAILY
Qty: 30 TABLET | Refills: 3 | Status: SHIPPED | OUTPATIENT
Start: 2025-03-27

## 2025-03-27 RX ORDER — INSULIN LISPRO 100 [IU]/ML
INJECTION, SOLUTION INTRAVENOUS; SUBCUTANEOUS
Qty: 30 ML | Refills: 11 | Status: SHIPPED | OUTPATIENT
Start: 2025-03-27

## 2025-03-27 NOTE — PROGRESS NOTES
Patient Name: Anthony Hadley     : 1985    MRN: [id number]     Subjective:     Patient ID: Anthony Hadley is a 39 y.o. male.    Chief Complaint:   Chief Complaint   Patient presents with    Follow-up     Pt is here with c/o increased weakness, fatigue, N/V x three days        HPI:   3/27/25: Pt presents to clinic today c/o increased weakness, fatigue, N/V x 3 days.  Seen by Endocrine recently and c/o same issues.  Is having confusion episodes and he gets lightheaded and dizzy.  He has hx of anemia of chronic disease. C/o vomiting, abdominal pain, neck pain and HA's. He does have several allergies noted on his allergy panel.  He is established with ENT as well.      Cardiology-c/o orthopnea, SOB,  pt states that he has had a heart attack in the past.    Hx gastroparesis. Workup performed by Endocrinology  PA. Testosterone levels WNL.  Vitamin D mildly decreased at 24.  A1c is 7.7.   Kidney and liver function is WNL.       ROS:      Review of Systems   Respiratory:  Positive for shortness of breath.    Cardiovascular:  Positive for orthopnea.   Gastrointestinal:  Positive for abdominal pain and vomiting.   Musculoskeletal:  Positive for neck pain.   Neurological:  Positive for headaches.       12 point review of systems conducted, negative except as stated in the history of present illness. See HPI for details.    History:     Past Medical History:   Diagnosis Date    Anxiety     Depression     Diabetes mellitus type I     Hyperkalemia 2022    Type 1 diabetes mellitus with diabetic polyneuropathy 2022        Past Surgical History:   Procedure Laterality Date    APPENDECTOMY      CHOLECYSTECTOMY      ETHMOIDECTOMY  2024    Procedure: ETHMOIDECTOMY;  Surgeon: Yohan Cartwright MD;  Location: [facility];  Service: ENT;;    FOOT SURGERY      bone spure and fracture    FUNCTIONAL ENDOSCOPIC SINUS SURGERY (FESS) Bilateral 2024    Procedure: ANTERIOR FESS, TURBINATE REDUCTION;  Surgeon: Yohan Cartwright  MD JASMIN;  Location: [facility];  Service: ENT;  Laterality: Bilateral;  No Navigation    NASAL SEPTOPLASTY  2024    Procedure: SEPTOPLASTY, NOSE;  Surgeon: Yohan Cartwright MD;  Location: [facility];  Service: ENT;;       Family History   Problem Relation Name Age of Onset    Heart attack Mother      Heart disease Father      Diabetes Father      Crohn's disease Father          Social History     Tobacco Use    Smoking status: Former     Current packs/day: 0.00     Types: Cigarettes     Quit date: 2009     Years since quittin.1    Smokeless tobacco: Never    Tobacco comments:     Medical weed     Pt states stopped smoking cigs in years (15 years)    Substance and Sexual Activity    Alcohol use: Not Currently     Comment: socially    Drug use: Yes     Types: Marijuana     Comment: medically prescribed to prevent nausea    Sexual activity: Yes       Current Outpatient Medications   Medication Instructions    dextroamphetamine-amphetamine (ADDERALL) 20 mg tablet 1 tablet, Oral, 2 times daily    fluticasone propionate (FLONASE) 50 mcg, Each Nostril, Daily    ibuprofen (ADVIL,MOTRIN) 800 mg, Oral, Every 6 hours PRN    insulin lispro (HUMALOG U-100 INSULIN) 100 unit/mL injection Use 52 to 75 units max in insulin pump  Max daily dose 100 units.    lisinopriL (PRINIVIL,ZESTRIL) 5 mg, Oral, Daily    loratadine (CLARITIN) 10 mg, Oral, Daily    metoclopramide HCl (REGLAN) 5 mg, Oral, 4 times daily    mupirocin (BACTROBAN) 2 % ointment Topical (Top), 3 times daily    NON FORMULARY MEDICATION Pt states uses Medical Marijuana as needed    paroxetine (PAXIL) 20 mg, Oral, Every morning    promethazine (PHENERGAN) 25 mg, Topical (Top), Every 6 hours PRN    rosuvastatin (CRESTOR) 5 mg, Oral, Daily        Review of patient's allergies indicates:   Allergen Reactions    Meropenem Anaphylaxis     Cardiac arrest     Pcn [penicillins] Hives    Topamax [topiramate] Hives       Objective:     Visit Vitals  /84 (BP Location:  "Left arm, Patient Position: Sitting)   Pulse 95   Temp 98 °F (36.7 °C) (Oral)   Resp 18   Ht 5' 11" (1.803 m)   Wt 74.4 kg (164 lb)   SpO2 98%   BMI 22.87 kg/m²       Physical Examination:     Physical Exam  Vitals reviewed.   Constitutional:       Appearance: Normal appearance. He is normal weight.   HENT:      Head: Normocephalic.   Cardiovascular:      Rate and Rhythm: Normal rate and regular rhythm.      Pulses: Normal pulses.      Heart sounds: Normal heart sounds.   Pulmonary:      Effort: Pulmonary effort is normal.      Breath sounds: Normal breath sounds.   Abdominal:      General: Abdomen is flat.      Palpations: Abdomen is soft.      Tenderness: There is generalized abdominal tenderness. There is no right CVA tenderness, left CVA tenderness, guarding or rebound. Negative signs include Logan's sign.      Hernia: No hernia is present.   Musculoskeletal:         General: Normal range of motion.      Cervical back: Normal range of motion.   Skin:     General: Skin is warm and dry.   Neurological:      Mental Status: He is alert.   Psychiatric:         Mood and Affect: Mood normal.         Lab Results:     Chemistry:  Lab Results   Component Value Date     03/25/2025    K 5.0 03/25/2025    BUN 27.8 (H) 03/25/2025    CREATININE 1.21 03/25/2025    EGFRNORACEVR >60 03/25/2025    GLUCOSE 91 03/25/2025    CALCIUM 8.5 03/25/2025    ALKPHOS 54 03/25/2025    LABPROT 7.0 03/25/2025    ALBUMIN 3.3 (L) 03/25/2025    AST 21 03/25/2025    ALT 16 03/25/2025    NBMDLTJB73OD 24 (L) 03/25/2025    TSH 2.162 03/25/2025    BWDKMN7BTVW 1.13 07/01/2024        Lab Results   Component Value Date    HGBA1C 7.7 (H) 03/25/2025        Hematology:  Lab Results   Component Value Date    WBC 7.16 02/28/2025    HGB 12.7 (L) 02/28/2025    HCT 37.9 (L) 02/28/2025     02/28/2025       Lipid Panel:  Lab Results   Component Value Date    CHOL 192 03/25/2025    HDL 63 (H) 03/25/2025    .00 03/25/2025    TRIG 77 03/25/2025    " TOTALCHOLEST 3 03/25/2025        Urine:  Lab Results   Component Value Date    APPEARANCEUA Clear 07/01/2024    SGUA 1.023 07/01/2024    PROTEINUA 2+ (A) 07/01/2024    KETONESUA Negative 07/01/2024    LEUKOCYTESUR Negative 07/01/2024    RBCUA 0-5 07/01/2024    WBCUA 0-5 07/01/2024    BACTERIA Moderate (A) 07/01/2024    SQEPUA Trace (A) 07/01/2024    HYALINECASTS None Seen 07/01/2024    CREATRANDUR 138.4 03/25/2025    PROTEINURINE 61.5 02/04/2024        Assessment:          ICD-10-CM ICD-9-CM   1. Diabetic gastroparesis  E11.43 250.60    K31.84 536.3   2. Nausea and vomiting, unspecified vomiting type  R11.2 787.01   3. Fatigue, unspecified type  R53.83 780.79   4. Type 1 diabetes mellitus with hyperglycemia  E10.65 250.01   5. Orthopnea  R06.01 786.02   6. Shortness of breath  R06.02 786.05          Plan:     1. Diabetic gastroparesis  Assessment & Plan:  Referral to GI today    Orders:  -     X-Ray Abdomen Flat And Erect  -     Ambulatory referral/consult to Gastroenterology; Future; Expected date: 04/03/2025    2. Nausea and vomiting, unspecified vomiting type  Assessment & Plan:  Topical promethazine 0.5ml q 6 hours prn nausea/vomiting, disp 25 ml with  5 refills    Orders:  -     X-Ray Abdomen Flat And Erect  -     Ambulatory referral/consult to Gastroenterology; Future; Expected date: 04/03/2025  -     promethazine 25 mg/0.5 mL topical gel; Apply 0.5 mLs (25 mg total) topically every 6 (six) hours as needed for Nausea.  Dispense: 25 mL; Refill: 5    3. Fatigue, unspecified type  -     Ferritin  -     Reticulocytes  -     Iron and TIBC  -     CBC Auto Differential  -     loratadine (CLARITIN) 10 mg tablet; Take 1 tablet (10 mg total) by mouth once daily.  Dispense: 30 tablet; Refill: 3  -     fluticasone propionate (FLONASE) 50 mcg/actuation nasal spray; 1 spray (50 mcg total) by Each Nostril route once daily.  Dispense: 18.2 mL; Refill: 3  -     Ambulatory referral/consult to Cardiology; Future; Expected date:  04/03/2025    4. Type 1 diabetes mellitus with hyperglycemia  -     insulin lispro (HUMALOG U-100 INSULIN) 100 unit/mL injection; Use 52 to 75 units max in insulin pump  Max daily dose 100 units.  Dispense: 30 mL; Refill: 11    5. Orthopnea  -     Ambulatory referral/consult to Cardiology; Future; Expected date: 04/03/2025    6. Shortness of breath  -     Ambulatory referral/consult to Cardiology; Future; Expected date: 04/03/2025           Follow up in about 12 weeks (around 6/19/2025) for Wellness..  In addition to their scheduled follow up, the patient has also been instructed to follow up on as needed basis.       Future Appointments   Date Time Provider Department Center   4/21/2025  1:00 PM Darya Narayanan PA-C [facility]    6/19/2025  8:20 AM Nasrin Arambula FNP [facility]   8/18/2025  3:00 PM Darya Narayanan PA-C [facility]    9/15/2025  2:10 PM [facility], [facility]

## 2025-03-27 NOTE — PROGRESS NOTES
I have sent medications and/or lab orders in for this patient.  Please notify the patient.     No orders of the defined types were placed in this encounter.      Medications Ordered This Encounter   Medications    promethazine 25 mg/0.5 mL topical gel     Sig: Apply 0.5 mLs (25 mg total) topically every 6 (six) hours as needed for Nausea.     Dispense:  25 mL     Refill:  5

## 2025-03-28 ENCOUNTER — RESULTS FOLLOW-UP (OUTPATIENT)
Dept: FAMILY MEDICINE | Facility: CLINIC | Age: 40
End: 2025-03-28

## 2025-03-28 LAB
ALLERGEN DOG RCAN F 1 IGE (OLG): <0.1 KUA/L
ALLERGEN DOG RCAN F 2 IGE (OLG): <0.1 KUA/L
ALLERGEN DOG RCAN F 3 IGE (OLG): <0.1 KUA/L
ALLERGEN DOG RCAN F 4 IGE (OLG): <0.1 KUA/L
ALLERGEN DOG RCAN F 5 IGE (OLG): 0.11 KUA/L
ALLERGEN DOG RCAN F 6 IGE (OLG): <0.1 KUA/L

## 2025-04-21 ENCOUNTER — TELEPHONE (OUTPATIENT)
Dept: ENDOCRINOLOGY | Facility: CLINIC | Age: 40
End: 2025-04-21
Payer: MEDICAID
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
## 2025-04-25 DIAGNOSIS — F90.9 ATTENTION DEFICIT HYPERACTIVITY DISORDER (ADHD), UNSPECIFIED ADHD TYPE: ICD-10-CM

## 2025-04-28 DIAGNOSIS — F90.9 ATTENTION DEFICIT HYPERACTIVITY DISORDER (ADHD), UNSPECIFIED ADHD TYPE: ICD-10-CM

## 2025-04-28 RX ORDER — DEXTROAMPHETAMINE SACCHARATE, AMPHETAMINE ASPARTATE, DEXTROAMPHETAMINE SULFATE AND AMPHETAMINE SULFATE 5; 5; 5; 5 MG/1; MG/1; MG/1; MG/1
1 TABLET ORAL 2 TIMES DAILY
Qty: 60 TABLET | Refills: 0 | Status: SHIPPED | OUTPATIENT
Start: 2025-04-28 | End: 2025-04-28 | Stop reason: SDUPTHER

## 2025-04-28 RX ORDER — DEXTROAMPHETAMINE SACCHARATE, AMPHETAMINE ASPARTATE, DEXTROAMPHETAMINE SULFATE AND AMPHETAMINE SULFATE 5; 5; 5; 5 MG/1; MG/1; MG/1; MG/1
1 TABLET ORAL 2 TIMES DAILY
Qty: 60 TABLET | Refills: 0 | Status: SHIPPED | OUTPATIENT
Start: 2025-04-28

## 2025-04-29 ENCOUNTER — PATIENT MESSAGE (OUTPATIENT)
Dept: OTOLARYNGOLOGY | Facility: CLINIC | Age: 40
End: 2025-04-29
Payer: MEDICAID

## 2025-04-30 ENCOUNTER — HOSPITAL ENCOUNTER (EMERGENCY)
Facility: HOSPITAL | Age: 40
Discharge: HOME | End: 2025-04-30
Attending: STUDENT IN AN ORGANIZED HEALTH CARE EDUCATION/TRAINING PROGRAM
Payer: MEDICAID

## 2025-04-30 VITALS
OXYGEN SATURATION: 99 % | HEIGHT: 71 IN | DIASTOLIC BLOOD PRESSURE: 95 MMHG | TEMPERATURE: 98 F | BODY MASS INDEX: 23.1 KG/M2 | WEIGHT: 165 LBS | RESPIRATION RATE: 19 BRPM | SYSTOLIC BLOOD PRESSURE: 148 MMHG | HEART RATE: 74 BPM

## 2025-04-30 DIAGNOSIS — R53.1 WEAKNESS: Primary | ICD-10-CM

## 2025-04-30 DIAGNOSIS — R00.0 TACHYCARDIA, UNSPECIFIED: ICD-10-CM

## 2025-04-30 DIAGNOSIS — R53.1 GENERALIZED WEAKNESS: ICD-10-CM

## 2025-04-30 LAB
ALBUMIN SERPL-MCNC: 3.5 G/DL (ref 3.5–5)
ALBUMIN/GLOB SERPL: 0.9 RATIO (ref 1.1–2)
ALP SERPL-CCNC: 73 UNIT/L (ref 40–150)
ALT SERPL-CCNC: 14 UNIT/L (ref 0–55)
ANION GAP SERPL CALC-SCNC: 7 MEQ/L
APTT PPP: 25.1 SECONDS (ref 23.2–33.7)
AST SERPL-CCNC: 19 UNIT/L (ref 11–45)
BACTERIA #/AREA URNS AUTO: ABNORMAL /HPF
BASOPHILS # BLD AUTO: 0.13 X10(3)/MCL
BASOPHILS NFR BLD AUTO: 1.2 %
BILIRUB SERPL-MCNC: 0.3 MG/DL
BILIRUB UR QL STRIP.AUTO: NEGATIVE
BUN SERPL-MCNC: 20.7 MG/DL (ref 8.9–20.6)
CALCIUM SERPL-MCNC: 9 MG/DL (ref 8.4–10.2)
CHLORIDE SERPL-SCNC: 105 MMOL/L (ref 98–107)
CLARITY UR: CLEAR
CO2 SERPL-SCNC: 28 MMOL/L (ref 22–29)
COLOR UR AUTO: ABNORMAL
CREAT SERPL-MCNC: 1.11 MG/DL (ref 0.72–1.25)
CREAT/UREA NIT SERPL: 19
EOSINOPHIL # BLD AUTO: 0.31 X10(3)/MCL (ref 0–0.9)
EOSINOPHIL NFR BLD AUTO: 2.8 %
ERYTHROCYTE [DISTWIDTH] IN BLOOD BY AUTOMATED COUNT: 12.1 % (ref 11.5–17)
FLUAV AG UPPER RESP QL IA.RAPID: NOT DETECTED
FLUBV AG UPPER RESP QL IA.RAPID: NOT DETECTED
GFR SERPLBLD CREATININE-BSD FMLA CKD-EPI: >60 ML/MIN/1.73/M2
GLOBULIN SER-MCNC: 4 GM/DL (ref 2.4–3.5)
GLUCOSE SERPL-MCNC: 217 MG/DL (ref 74–100)
GLUCOSE UR QL STRIP: ABNORMAL
HCT VFR BLD AUTO: 41 % (ref 42–52)
HGB BLD-MCNC: 13.2 G/DL (ref 14–18)
HGB UR QL STRIP: ABNORMAL
IMM GRANULOCYTES # BLD AUTO: 0.04 X10(3)/MCL (ref 0–0.04)
IMM GRANULOCYTES NFR BLD AUTO: 0.4 %
INR PPP: 0.9
KETONES UR QL STRIP: NEGATIVE
LEUKOCYTE ESTERASE UR QL STRIP: NEGATIVE
LYMPHOCYTES # BLD AUTO: 1.72 X10(3)/MCL (ref 0.6–4.6)
LYMPHOCYTES NFR BLD AUTO: 15.3 %
MAGNESIUM SERPL-MCNC: 2 MG/DL (ref 1.6–2.6)
MCH RBC QN AUTO: 28.8 PG (ref 27–31)
MCHC RBC AUTO-ENTMCNC: 32.2 G/DL (ref 33–36)
MCV RBC AUTO: 89.5 FL (ref 80–94)
MONOCYTES # BLD AUTO: 0.66 X10(3)/MCL (ref 0.1–1.3)
MONOCYTES NFR BLD AUTO: 5.9 %
NEUTROPHILS # BLD AUTO: 8.37 X10(3)/MCL (ref 2.1–9.2)
NEUTROPHILS NFR BLD AUTO: 74.4 %
NITRITE UR QL STRIP: NEGATIVE
NRBC BLD AUTO-RTO: 0 %
OHS QRS DURATION: 88 MS
OHS QTC CALCULATION: 418 MS
PH UR STRIP: 7 [PH]
PLATELET # BLD AUTO: 241 X10(3)/MCL (ref 130–400)
PMV BLD AUTO: 11.3 FL (ref 7.4–10.4)
POCT GLUCOSE: 215 MG/DL (ref 70–110)
POTASSIUM SERPL-SCNC: 4.5 MMOL/L (ref 3.5–5.1)
PROT SERPL-MCNC: 7.5 GM/DL (ref 6.4–8.3)
PROT UR QL STRIP: ABNORMAL
PROTHROMBIN TIME: 11.7 SECONDS (ref 12.5–14.5)
RBC # BLD AUTO: 4.58 X10(6)/MCL (ref 4.7–6.1)
RBC #/AREA URNS AUTO: ABNORMAL /HPF
RSV A 5' UTR RNA NPH QL NAA+PROBE: NOT DETECTED
SARS-COV-2 RNA RESP QL NAA+PROBE: NOT DETECTED
SODIUM SERPL-SCNC: 140 MMOL/L (ref 136–145)
SP GR UR STRIP.AUTO: 1.02 (ref 1–1.03)
SQUAMOUS #/AREA URNS LPF: ABNORMAL /HPF
TROPONIN I SERPL-MCNC: <0.01 NG/ML (ref 0–0.04)
TSH SERPL-ACNC: 2.31 UIU/ML (ref 0.35–4.94)
UROBILINOGEN UR STRIP-ACNC: NORMAL
WBC # BLD AUTO: 11.23 X10(3)/MCL (ref 4.5–11.5)
WBC #/AREA URNS AUTO: ABNORMAL /HPF

## 2025-04-30 PROCEDURE — 85025 COMPLETE CBC W/AUTO DIFF WBC: CPT | Performed by: STUDENT IN AN ORGANIZED HEALTH CARE EDUCATION/TRAINING PROGRAM

## 2025-04-30 PROCEDURE — 82962 GLUCOSE BLOOD TEST: CPT

## 2025-04-30 PROCEDURE — 83735 ASSAY OF MAGNESIUM: CPT | Performed by: STUDENT IN AN ORGANIZED HEALTH CARE EDUCATION/TRAINING PROGRAM

## 2025-04-30 PROCEDURE — 84443 ASSAY THYROID STIM HORMONE: CPT | Performed by: STUDENT IN AN ORGANIZED HEALTH CARE EDUCATION/TRAINING PROGRAM

## 2025-04-30 PROCEDURE — 85730 THROMBOPLASTIN TIME PARTIAL: CPT | Performed by: STUDENT IN AN ORGANIZED HEALTH CARE EDUCATION/TRAINING PROGRAM

## 2025-04-30 PROCEDURE — 63600175 PHARM REV CODE 636 W HCPCS: Performed by: STUDENT IN AN ORGANIZED HEALTH CARE EDUCATION/TRAINING PROGRAM

## 2025-04-30 PROCEDURE — 84484 ASSAY OF TROPONIN QUANT: CPT | Performed by: STUDENT IN AN ORGANIZED HEALTH CARE EDUCATION/TRAINING PROGRAM

## 2025-04-30 PROCEDURE — 80053 COMPREHEN METABOLIC PANEL: CPT | Performed by: STUDENT IN AN ORGANIZED HEALTH CARE EDUCATION/TRAINING PROGRAM

## 2025-04-30 PROCEDURE — 81001 URINALYSIS AUTO W/SCOPE: CPT | Performed by: STUDENT IN AN ORGANIZED HEALTH CARE EDUCATION/TRAINING PROGRAM

## 2025-04-30 PROCEDURE — 99285 EMERGENCY DEPT VISIT HI MDM: CPT | Mod: 25

## 2025-04-30 PROCEDURE — 85610 PROTHROMBIN TIME: CPT | Performed by: STUDENT IN AN ORGANIZED HEALTH CARE EDUCATION/TRAINING PROGRAM

## 2025-04-30 PROCEDURE — 96360 HYDRATION IV INFUSION INIT: CPT

## 2025-04-30 PROCEDURE — 0241U COVID/RSV/FLU A&B PCR: CPT | Performed by: STUDENT IN AN ORGANIZED HEALTH CARE EDUCATION/TRAINING PROGRAM

## 2025-04-30 PROCEDURE — 93005 ELECTROCARDIOGRAM TRACING: CPT

## 2025-04-30 PROCEDURE — 93010 ELECTROCARDIOGRAM REPORT: CPT | Mod: ,,, | Performed by: INTERNAL MEDICINE

## 2025-04-30 RX ADMIN — SODIUM CHLORIDE, POTASSIUM CHLORIDE, SODIUM LACTATE AND CALCIUM CHLORIDE 1000 ML: 600; 310; 30; 20 INJECTION, SOLUTION INTRAVENOUS at 11:04

## 2025-04-30 RX ADMIN — SODIUM CHLORIDE, POTASSIUM CHLORIDE, SODIUM LACTATE AND CALCIUM CHLORIDE 1000 ML: 600; 310; 30; 20 INJECTION, SOLUTION INTRAVENOUS at 08:04

## 2025-04-30 NOTE — DISCHARGE INSTRUCTIONS
Thanks for letting us take care of you today!  It is our goal to give you courteous care and to keep you comfortable and informed, if you have any questions before you leave I will be happy to try and answer them.    Here is some advice after your visit:    Your visit in the emergency department is NOT definitive care - please follow-up with your primary care doctor and/or specialist within 1-2 days. Please return to the emergency department if you develop worsening symptoms including: fever, chills, chest pain, shortness of breath, weakness, numbness, tingling, nausea, vomiting, inability to eat, drink, or take your medication. Please return if you have any worsening in your condition or if you have any other concerns.    If you had radiology exams like an XRAY or CT in the emergency Department the interpreation on them may be preliminary - there may be less time sensitive findings on the reports please obtain these reports within 24 hours from the hospital or by using your out on your mobile phone to access records.  Bring these to your primary care doctor and/or specialist for further review of incidental findings.    Please review any LAB WORK from your visit today with your primary care physician.    You should have follow up with the cardiology May 14th at 3:30 p.m..  Please expect a phone call or e-mail with more detail.    You are welcome to return emergency department any time with any worsening symptoms

## 2025-04-30 NOTE — ED PROVIDER NOTES
"Encounter Date: 4/30/2025    SCRIBE #1 NOTE: I, Chrissie Lopez, am scribing for, and in the presence of,  Dony Leyva MD. I have scribed the following portions of the note - Other sections scribed: HPI, ROS, PE.       History     Chief Complaint   Patient presents with    Weakness     Weakness and dizziness for years, pt reports it started again 2 days ago. Pt reports frequent falls with a nosebleed yesterday and also reports n/v. Denies blood thinners. Gcs 15 cbg-215 in triage     Patient is a 39-year-old male with a PMHx of anxiety, depression, and DM presents to the ED for dizziness beginning 2 years ago. Patient reports worsening of his symptoms 2 days ago. He reports frequently falling with nausea and vomiting. He reports his symptoms are worse with position changes. He reports "room-spinning" dizziness. He reports having a nosebleed yesterday. He denies history of vertigo. He denies any unilateral weakness, abdominal pain, chest pain, or dark stools.    The history is provided by the patient and medical records. No  was used.     Review of patient's allergies indicates:   Allergen Reactions    Meropenem Anaphylaxis     Cardiac arrest     Pcn [penicillins] Hives    Topamax [topiramate] Hives     Past Medical History:   Diagnosis Date    Anxiety     Depression     Diabetes mellitus type I     Hyperkalemia 12/02/2022    Type 1 diabetes mellitus with diabetic polyneuropathy 05/23/2022     Past Surgical History:   Procedure Laterality Date    APPENDECTOMY      CHOLECYSTECTOMY      ETHMOIDECTOMY  12/13/2024    Procedure: ETHMOIDECTOMY;  Surgeon: Yohan Cartwright MD;  Location: [facility] OR;  Service: ENT;;    FOOT SURGERY      bone spure and fracture    FUNCTIONAL ENDOSCOPIC SINUS SURGERY (FESS) Bilateral 12/13/2024    Procedure: ANTERIOR FESS, TURBINATE REDUCTION;  Surgeon: Yohan Cartwright MD;  Location: [facility] OR;  Service: ENT;  Laterality: Bilateral;  No Navigation    NASAL SEPTOPLASTY  " 12/13/2024    Procedure: SEPTOPLASTY, NOSE;  Surgeon: Yohan Cartwright MD;  Location: [facility];  Service: ENT;;     Family History   Problem Relation Name Age of Onset    Heart attack Mother      Heart disease Father      Diabetes Father      Crohn's disease Father       Social History[1]  Review of Systems   HENT:  Positive for nosebleeds.    Cardiovascular:  Negative for chest pain.   Gastrointestinal:  Positive for nausea and vomiting. Negative for abdominal pain and blood in stool.   Neurological:  Positive for dizziness and syncope. Negative for weakness.       Physical Exam     Initial Vitals [04/30/25 0704]   BP Pulse Resp Temp SpO2   (!) 153/95 88 18 97.6 °F (36.4 °C) 100 %      MAP       --         Physical Exam    Nursing note and vitals reviewed.  Constitutional: He appears well-developed and well-nourished. He is not diaphoretic. No distress.   HENT:   Head: Normocephalic and atraumatic.   Right Ear: External ear normal.   Left Ear: External ear normal.   Nose: Nose normal.   Eyes: EOM are normal. Pupils are equal, round, and reactive to light. Right eye exhibits no discharge. Left eye exhibits no discharge.   Cardiovascular:  Normal rate, regular rhythm and normal heart sounds.     Exam reveals no gallop and no friction rub.       No murmur heard.  Pulmonary/Chest: Effort normal and breath sounds normal. No respiratory distress. He has no wheezes. He has no rhonchi. He has no rales. He exhibits no tenderness.   Abdominal: Abdomen is soft. Bowel sounds are normal. He exhibits no distension and no mass. There is no abdominal tenderness. There is no rebound and no guarding.   Musculoskeletal:         General: No edema. Normal range of motion.     Neurological: He is alert and oriented to person, place, and time. No cranial nerve deficit or sensory deficit.   Skin: Skin is warm and dry. Capillary refill takes less than 2 seconds.         ED Course   Procedures  Labs Reviewed   COMPREHENSIVE METABOLIC PANEL  - Abnormal       Result Value    Sodium 140      Potassium 4.5      Chloride 105      CO2 28      Glucose 217 (*)     Blood Urea Nitrogen 20.7 (*)     Creatinine 1.11      Calcium 9.0      Protein Total 7.5      Albumin 3.5      Globulin 4.0 (*)     Albumin/Globulin Ratio 0.9 (*)     Bilirubin Total 0.3      ALP 73      ALT 14      AST 19      eGFR >60      Anion Gap 7.0      BUN/Creatinine Ratio 19     URINALYSIS, REFLEX TO URINE CULTURE - Abnormal    Color, UA Light-Yellow      Appearance, UA Clear      Specific Gravity, UA 1.022      pH, UA 7.0      Protein, UA 3+ (*)     Glucose, UA 1+ (*)     Ketones, UA Negative      Blood, UA 1+ (*)     Bilirubin, UA Negative      Urobilinogen, UA Normal      Nitrites, UA Negative      Leukocyte Esterase, UA Negative      RBC, UA 0-5      WBC, UA 0-5      Bacteria, UA None Seen      Squamous Epithelial Cells, UA None Seen     PROTIME-INR - Abnormal    PT 11.7 (*)     INR 0.9      Narrative:     Protimes are used to monitor anticoagulant agents such as warfarin. PT INR values are based on the current patient normal mean and the ALBERTO value for the specific instrument reagent used.  **Routine theraputic target values for the INR are 2.0-3.0**   CBC WITH DIFFERENTIAL - Abnormal    WBC 11.23      RBC 4.58 (*)     Hgb 13.2 (*)     Hct 41.0 (*)     MCV 89.5      MCH 28.8      MCHC 32.2 (*)     RDW 12.1      Platelet 241      MPV 11.3 (*)     Neut % 74.4      Lymph % 15.3      Mono % 5.9      Eos % 2.8      Basophil % 1.2      Imm Grans % 0.4      Neut # 8.37      Lymph # 1.72      Mono # 0.66      Eos # 0.31      Baso # 0.13      Imm Gran # 0.04      NRBC% 0.0     POCT GLUCOSE - Abnormal    POCT Glucose 215 (*)    APTT - Normal    PTT 25.1     COVID/RSV/FLU A&B PCR - Normal    Influenza A PCR Not Detected      Influenza B PCR Not Detected      Respiratory Syncytial Virus PCR Not Detected      SARS-CoV-2 PCR Not Detected      Narrative:     The Xpert Xpress SARS-CoV-2/FLU/RSV plus is  a rapid, multiplexed real-time PCR test intended for the simultaneous qualitative detection and differentiation of SARS-CoV-2, Influenza A, Influenza B, and respiratory syncytial virus (RSV) viral RNA in either nasopharyngeal swab or nasal swab specimens.         MAGNESIUM - Normal    Magnesium Level 2.00     TROPONIN I - Normal    Troponin-I <0.010     TSH - Normal    TSH 2.311     CBC W/ AUTO DIFFERENTIAL    Narrative:     The following orders were created for panel order CBC auto differential.  Procedure                               Abnormality         Status                     ---------                               -----------         ------                     CBC with Differential[[id number]]       Abnormal            Final result                 Please view results for these tests on the individual orders.        ECG Results              EKG 12-lead (Final result)        Collection Time Result Time QRS Duration OHS QTC Calculation    04/30/25 07:06:59 04/30/25 07:55:10 88 418                     Final result by Interface, Lab In [facility] (04/30/25 07:55:18)                   Narrative:    Test Reason : R53.1,    Vent. Rate :  83 BPM     Atrial Rate :  83 BPM     P-R Int : 110 ms          QRS Dur :  88 ms      QT Int : 356 ms       P-R-T Axes :  74  24  59 degrees    QTcB Int : 418 ms    Sinus rhythm with short [place]  Otherwise normal ECG  No previous ECGs available  Confirmed by Mando Redd ([id number]) on 4/30/2025 7:55:07 AM    Referred By:            Confirmed By: Mando Redd                                  Imaging Results              CT Head Without Contrast (Final result)  Result time 04/30/25 07:37:39      Final result by Michelet Kendall MD (04/30/25 07:37:39)                   Impression:      No acute intracranial findings identified.      Electronically signed by: Michelet Kendall  Date:    04/30/2025  Time:    07:37               Narrative:    EXAMINATION:  CT HEAD WITHOUT CONTRAST    CLINICAL  HISTORY:  Dizziness, persistent/recurrent, cardiac or vascular cause suspected;    TECHNIQUE:  Sequential axial images were performed of the brain without contrast.    Dose product length of 945 mGycm. Automated exposure control was utilized to minimize radiation dose.    COMPARISON:  None available.    FINDINGS:  Gray white matter differentiation is.  There is no intracranial mass effect, midline shift, hydrocephalus or hemorrhage. There is no sulcal effacement or low attenuation changes to suggest recent large vessel territory infarction. There is no acute extra axial fluid collection.    Left maxillary sinus large retention cyst and mild mucoperiosteal thickening of the right maxillary sinus.  Otherwise, visualized paranasal sinuses are clear without mucosal thickening, polypoidal abnormality or air-fluid levels. Mastoid air cells aeration is optimal.                                       X-Ray Chest AP Portable (Final result)  Result time 04/30/25 09:17:00      Final result by Gustavo Lockwood MD (04/30/25 09:17:00)                   Impression:      No acute chest disease is identified.      Electronically signed by: Gustavo Lockwood  Date:    04/30/2025  Time:    09:17               Narrative:    EXAMINATION:  XR CHEST AP PORTABLE    CLINICAL HISTORY:  weakness;, .    FINDINGS:  No alveolar consolidation, effusion, or pneumothorax is seen.   The thoracic aorta is normal  cardiac silhouette, central pulmonary vessels and mediastinum are normal in size and are grossly unremarkable.   visualized osseous structures are grossly unremarkable.                                       Medications   lactated ringers bolus 1,000 mL (0 mLs Intravenous Stopped 4/30/25 0903)   lactated ringers bolus 1,000 mL (1,000 mLs Intravenous New Bag 4/30/25 1101)     Medical Decision Making  The differential diagnosis includes, but is not limited to, benign positional vertigo, vertigo, dizziness, otoliths, electrolyte abnormality,  renal dysfunction    See ED course for MDM.  Patient has tolerated p.o.    Amount and/or Complexity of Data Reviewed  External Data Reviewed: notes.     Details: See ED course  Labs: ordered. Decision-making details documented in ED Course.  Radiology: ordered and independent interpretation performed. Decision-making details documented in ED Course.  ECG/medicine tests: ordered and independent interpretation performed. Decision-making details documented in ED Course.    Risk  OTC drugs.  Prescription drug management.            Scribe Attestation:   Scribe #1: I performed the above scribed service and the documentation accurately describes the services I performed. I attest to the accuracy of the note.    Attending Attestation:           Physician Attestation for Scribe:  Physician Attestation Statement for Scribe #1: I, Dony Leyva MD, reviewed documentation, as scribed by Chrissie Lopez in my presence, and it is both accurate and complete.             ED Course as of 04/30/25 1527   Wed Apr 30, 2025   0710 EKG done at 7:06 a.m. shows sinus rhythm rate of 83 .  Normal axis.  No ST elevation or depression [MM]   0743 CBC auto differential(!)  Stable anemia no leukocytosis [MM]   0743 POCT Glucose(!): 215 [MM]   0743 CT Head Without Contrast  Negative for acute [MM]   0743 X-Ray Chest AP Portable  No obvious infiltrate or other acute process [MM]   0815 SARS-CoV2 (COVID-19) Qualitative PCR: Not Detected [MM]   0815 RSV Ag by Molecular Method: Not Detected [MM]   0815 Influenza B, Molecular: Not Detected [MM]   0815 Influenza A, Molecular: Not Detected [MM]   0815 Troponin I: <0.010 [MM]   0815 TSH: 2.311 [MM]   0815 Magnesium : 2.00 [MM]   0815 INR: 0.9 [MM]   0857 SARS-CoV2 (COVID-19) Qualitative PCR: Not Detected [MM]   0912 Chart review reveals patient was seen 03/27/2025.  Complaining of weakness fatigue nausea vomiting.  Follow up by endocrine.  Also at that time complaining of getting lightheaded and  dizzy.  History of gastroparesis.  Was noted to have A1c of 7.7. [MM]   1124 Discussed findings with the patient.  Somewhat orthostatic here in the emergency department.  Will give additional L of fluid.  We will allow him to eat here in the emergency department.  His labs are unrevealing otherwise.  Reports he feels better at this time.  I believe he will be suitable for discharge discussed with the patient is comfortable with the plan we will obtain him close follow up with the cardiology possibly has something similar to POTS.  Return precautions given.  Questions invited, questions answered to the best my ability.  Patient discharged home condition stable.   [MM]      ED Course User Index  [MM] Dony Leyva MD                           Clinical Impression:  Final diagnoses:  [R53.1] Weakness (Primary)  [R53.1] Generalized weakness  [R00.0] Tachycardia, unspecified          ED Disposition Condition    Discharge Stable          ED Prescriptions    None       Follow-up Information       Follow up With Specialties Details Why Contact Info    Nasrin Arambula FNP Family Medicine Call   [address] [facility] [address]  [phone]      [facility] - Emergency Dept Emergency Medicine Go to  If symptoms worsen [address] [facility] [address] [facility] [address] [phone]               [1]   Social History  Tobacco Use    Smoking status: Former     Current packs/day: 0.00     Types: Cigarettes     Quit date: 2009     Years since quittin.2    Smokeless tobacco: Never    Tobacco comments:     Medical weed     Pt states stopped smoking cigs in years (15 years)    Substance Use Topics    Alcohol use: Not Currently     Comment: socially    Drug use: Yes     Types: Marijuana     Comment: medically prescribed to prevent nausea        Dony Leyva MD  [phone]

## 2025-05-02 ENCOUNTER — OFFICE VISIT (OUTPATIENT)
Dept: FAMILY MEDICINE | Facility: CLINIC | Age: 40
End: 2025-05-02
Payer: MEDICAID

## 2025-05-02 VITALS
SYSTOLIC BLOOD PRESSURE: 119 MMHG | HEART RATE: 68 BPM | TEMPERATURE: 98 F | DIASTOLIC BLOOD PRESSURE: 78 MMHG | WEIGHT: 165 LBS | BODY MASS INDEX: 23.1 KG/M2 | RESPIRATION RATE: 18 BRPM | OXYGEN SATURATION: 98 % | HEIGHT: 71 IN

## 2025-05-02 DIAGNOSIS — R53.83 FATIGUE, UNSPECIFIED TYPE: Primary | ICD-10-CM

## 2025-05-02 DIAGNOSIS — I51.9 HEART DISEASE: ICD-10-CM

## 2025-05-02 DIAGNOSIS — F41.9 ANXIETY: ICD-10-CM

## 2025-05-02 DIAGNOSIS — R06.01 ORTHOPNEA: ICD-10-CM

## 2025-05-02 DIAGNOSIS — R42 DIZZINESS: ICD-10-CM

## 2025-05-02 PROCEDURE — 99215 OFFICE O/P EST HI 40 MIN: CPT | Mod: PBBFAC,PN | Performed by: NURSE PRACTITIONER

## 2025-05-02 RX ORDER — MECLIZINE HYDROCHLORIDE 25 MG/1
25 TABLET ORAL 3 TIMES DAILY PRN
Qty: 90 TABLET | Refills: 0 | Status: SHIPPED | OUTPATIENT
Start: 2025-05-02

## 2025-05-02 RX ORDER — BUSPIRONE HYDROCHLORIDE 5 MG/1
5 TABLET ORAL 3 TIMES DAILY
Qty: 90 TABLET | Refills: 11 | Status: SHIPPED | OUTPATIENT
Start: 2025-05-02 | End: 2026-05-02

## 2025-05-02 NOTE — PROGRESS NOTES
Patient Name: Anthony Hadley     : 1985    MRN: [id number]     Subjective:     Patient ID: Anthony Hadley is a 39 y.o. male.    Chief Complaint:   Chief Complaint   Patient presents with    Follow-up     Pt is here for follow up s/p ER visit        HPI: 25: ER FU. Pt just went to ER 2 days ago with same c/o from March visit.  We had done a workup then and referred him to Cardiology. He was told in ER to FU with Cardiology as he has sx of POTS.  He has not seen Cardiology yet, appointment is in . He would like another referral to be sent possibly for a sooner appointment.  Also is complaining of stress and anxiety today wanting referral to psych and asking for something to take the edge off until he can get to Cardiology for workup.    Today BP stable, denies n/v.  Unable to tolerate Paxil or other SSRIs. Workup in ER negative with EKG, CT and labs were unremarkable.    Chart review reveals patient was seen 2025.  Complaining of weakness fatigue nausea vomiting.  Follow up by endocrine.  Also at that time complaining of getting lightheaded and dizzy.  History of gastroparesis.  Was noted to have A1c of 7.7. [MM]   1124 Discussed findings with the patient.  Somewhat orthostatic here in the emergency department.  Will give additional L of fluid.  We will allow him to eat here in the emergency department.  His labs are unrevealing otherwise.  Reports he feels better at this time.  I believe he will be suitable for discharge discussed with the patient is comfortable with the plan we will obtain him close follow up with the cardiology possibly has something similar to POTS.  Return precautions given.  Questions invited, questions answered to the best my ability.  Patient discharged home condition stable.        3/27/25: Pt presents to clinic today c/o increased weakness, fatigue, N/V x 3 days.  Seen by Endocrine recently and c/o same issues.  Is having confusion episodes and he gets lightheaded and dizzy.  He  has hx of anemia of chronic disease. C/o vomiting, abdominal pain, neck pain and HA's. He does have several allergies noted on his allergy panel.  He is established with ENT as well.      Cardiology-c/o orthopnea, SOB,  pt states that he has had a heart attack in the past.    Hx gastroparesis. Workup performed by Endocrinology  PA. Testosterone levels WNL.  Vitamin D mildly decreased at 24.  A1c is 7.7.   Kidney and liver function is WNL.               ROS:      Review of Systems   Constitutional:  Positive for malaise/fatigue.   Gastrointestinal:  Positive for nausea and vomiting.   Neurological:  Positive for dizziness and weakness.   All other systems reviewed and are negative.      12 point review of systems conducted, negative except as stated in the history of present illness. See HPI for details.    History:     Past Medical History:   Diagnosis Date    Anxiety     Depression     Diabetes mellitus type I     Hyperkalemia 12/02/2022    Type 1 diabetes mellitus with diabetic polyneuropathy 05/23/2022        Past Surgical History:   Procedure Laterality Date    APPENDECTOMY      CHOLECYSTECTOMY      ETHMOIDECTOMY  12/13/2024    Procedure: ETHMOIDECTOMY;  Surgeon: Yohan Cartwright MD;  Location: [facility];  Service: ENT;;    FOOT SURGERY      bone spure and fracture    FUNCTIONAL ENDOSCOPIC SINUS SURGERY (FESS) Bilateral 12/13/2024    Procedure: ANTERIOR FESS, TURBINATE REDUCTION;  Surgeon: Yohan Cartwright MD;  Location: [facility] OR;  Service: ENT;  Laterality: Bilateral;  No Navigation    NASAL SEPTOPLASTY  12/13/2024    Procedure: SEPTOPLASTY, NOSE;  Surgeon: Yohan Cartwright MD;  Location: [facility] OR;  Service: ENT;;       Family History   Problem Relation Name Age of Onset    Heart attack Mother      Heart disease Father      Diabetes Father      Crohn's disease Father          Social History     Tobacco Use    Smoking status: Former     Current packs/day: 0.00     Types: Cigarettes     Quit date: 02/2009      "Years since quittin.2    Smokeless tobacco: Never    Tobacco comments:     Medical weed     Pt states stopped smoking cigs in years (15 years)    Substance and Sexual Activity    Alcohol use: Not Currently     Comment: socially    Drug use: Yes     Types: Marijuana     Comment: medically prescribed to prevent nausea    Sexual activity: Yes       Current Outpatient Medications   Medication Instructions    busPIRone (BUSPAR) 5 mg, Oral, 3 times daily    dextroamphetamine-amphetamine (ADDERALL) 20 mg tablet 1 tablet, Oral, 2 times daily    fluticasone propionate (FLONASE) 50 mcg, Each Nostril, Daily    insulin lispro (HUMALOG U-100 INSULIN) 100 unit/mL injection Use 52 to 75 units max in insulin pump  Max daily dose 100 units.    lisinopriL (PRINIVIL,ZESTRIL) 5 mg, Oral, Daily    loratadine (CLARITIN) 10 mg, Oral, Daily    meclizine (ANTIVERT) 25 mg, Oral, 3 times daily PRN    metoclopramide HCl (REGLAN) 5 mg, Oral, 4 times daily    mupirocin (BACTROBAN) 2 % ointment Topical (Top), 3 times daily    NON FORMULARY MEDICATION Pt states uses Medical Marijuana as needed    promethazine (PHENERGAN) 25 mg, Topical (Top), Every 6 hours PRN    rosuvastatin (CRESTOR) 5 mg, Oral, Daily        Review of patient's allergies indicates:   Allergen Reactions    Meropenem Anaphylaxis     Cardiac arrest     Pcn [penicillins] Hives    Topamax [topiramate] Hives       Patient Care Team:  Nasrin Arambula FNP as PCP - General (Family Medicine)    Objective:     Visit Vitals  /78 (BP Location: Left arm, Patient Position: Sitting)   Pulse 68   Temp 98.4 °F (36.9 °C) (Oral)   Resp 18   Ht 5' 11" (1.803 m)   Wt 74.8 kg (165 lb)   SpO2 98%   BMI 23.01 kg/m²       Physical Examination:     Physical Exam  Vitals reviewed.   Constitutional:       Appearance: Normal appearance. He is normal weight.   HENT:      Head: Normocephalic.   Cardiovascular:      Rate and Rhythm: Normal rate and regular rhythm.      Pulses: Normal pulses.      " Heart sounds: Normal heart sounds.   Pulmonary:      Effort: Pulmonary effort is normal.      Breath sounds: Normal breath sounds.   Abdominal:      General: Abdomen is flat.      Palpations: Abdomen is soft.   Musculoskeletal:         General: Normal range of motion.      Cervical back: Normal range of motion.   Skin:     General: Skin is warm and dry.   Neurological:      Mental Status: He is alert.   Psychiatric:         Mood and Affect: Mood normal.         Lab Results:     Chemistry:  Lab Results   Component Value Date     04/30/2025    K 4.5 04/30/2025    BUN 20.7 (H) 04/30/2025    CREATININE 1.11 04/30/2025    EGFRNORACEVR >60 04/30/2025    CALCIUM 9.0 04/30/2025    ALKPHOS 73 04/30/2025    LABPROT 12.6 01/17/2023    ALBUMIN 3.5 04/30/2025    AST 19 04/30/2025    ALT 14 04/30/2025    MG 2.00 04/30/2025    IXULFRBF18IN 24 (L) 03/25/2025    TSH 2.311 04/30/2025    QSJUYA4SCGW 1.13 07/01/2024        Lab Results   Component Value Date    HGBA1C 7.7 (H) 03/25/2025        Hematology:  Lab Results   Component Value Date    WBC 11.23 04/30/2025    HGB 13.2 (L) 04/30/2025    HCT 41.0 (L) 04/30/2025     04/30/2025       Lipid Panel:  Lab Results   Component Value Date    CHOL 192 03/25/2025    HDL 63 (H) 03/25/2025    .00 03/25/2025    TRIG 77 03/25/2025    TOTALCHOLEST 3 03/25/2025        Urine:  Lab Results   Component Value Date    APPEARANCEUA Clear 04/30/2025    SGUA 1.022 04/30/2025    PROTEINUA 3+ (A) 04/30/2025    KETONESUA Negative 04/30/2025    LEUKOCYTESUR Negative 04/30/2025    RBCUA 0-5 04/30/2025    WBCUA 0-5 04/30/2025    BACTERIA None Seen 04/30/2025    SQEPUA None Seen 04/30/2025    HYALINECASTS None Seen 07/01/2024    CREATRANDUR 138.4 03/25/2025    PROTEINURINE 61.5 02/04/2024        Assessment:          ICD-10-CM ICD-9-CM   1. Fatigue, unspecified type  R53.83 780.79   2. Orthopnea  R06.01 786.02   3. Heart disease  I51.9 429.9   4. Dizziness  R42 780.4   5. Anxiety  F41.9 300.00           Plan:     1. Fatigue, unspecified type    2. Orthopnea  -     Ambulatory referral/consult to Cardiology; Future; Expected date: 05/09/2025    3. Heart disease  -     Ambulatory referral/consult to Cardiology; Future; Expected date: 05/09/2025    4. Dizziness  Assessment & Plan:  Antivert rx today  Refer to [facility] for workup for possible POTS    Orders:  -     Ambulatory referral/consult to Cardiology; Future; Expected date: 05/09/2025  -     meclizine (ANTIVERT) 25 mg tablet; Take 1 tablet (25 mg total) by mouth 3 (three) times daily as needed for Dizziness.  Dispense: 90 tablet; Refill: 0    5. Anxiety  -     busPIRone (BUSPAR) 5 MG Tab; Take 1 tablet (5 mg total) by mouth 3 (three) times daily.  Dispense: 90 tablet; Refill: 11  -     Ambulatory referral/consult to Psychiatry; Future; Expected date: 05/09/2025           Follow up in about 7 weeks (around 6/19/2025)..  In addition to their scheduled follow up, the patient has also been instructed to follow up on as needed basis.       Future Appointments   Date Time Provider Department Center   6/19/2025  8:20 AM Nasrin Arambula FNP [facility]   6/23/2025  8:00 AM Lori Harris MD [facility] JAMES Brito    8/4/2025  9:00 AM Darya Narayanan PA-C [facility]   9/15/2025  2:10 PM PROVIDERS, [facility] PUNEET Porter

## 2025-05-06 ENCOUNTER — TELEPHONE (OUTPATIENT)
Dept: FAMILY MEDICINE | Facility: CLINIC | Age: 40
End: 2025-05-06
Payer: MEDICAID

## 2025-05-06 NOTE — TELEPHONE ENCOUNTER
Copied from CRM #[id number]. Topic: General Inquiry - Return Call  >> May 6, 2025 11:25 AM Haley wrote:  Who Called: Anthony Jomar    Patient is returning phone call    Who Left Message for Patient:   Does the patient know what this is regarding?:       Preferred Method of Contact: Phone Call  Patient's Preferred Phone Number on File: [phone]   Best Call Back Number, if different:  Additional Information:  returning call back   unknown

## 2025-05-14 ENCOUNTER — TELEPHONE (OUTPATIENT)
Dept: BEHAVIORAL HEALTH | Facility: CLINIC | Age: 40
End: 2025-05-14
Payer: MEDICAID

## 2025-05-14 ENCOUNTER — OFFICE VISIT (OUTPATIENT)
Dept: BEHAVIORAL HEALTH | Facility: CLINIC | Age: 40
End: 2025-05-14
Payer: MEDICAID

## 2025-05-14 VITALS
BODY MASS INDEX: 23.31 KG/M2 | WEIGHT: 166.5 LBS | HEIGHT: 71 IN | SYSTOLIC BLOOD PRESSURE: 138 MMHG | DIASTOLIC BLOOD PRESSURE: 86 MMHG

## 2025-05-14 DIAGNOSIS — F41.1 GAD (GENERALIZED ANXIETY DISORDER): Chronic | ICD-10-CM

## 2025-05-14 DIAGNOSIS — F41.0 PANIC DISORDER: ICD-10-CM

## 2025-05-14 DIAGNOSIS — F90.2 ATTENTION DEFICIT HYPERACTIVITY DISORDER (ADHD), COMBINED TYPE: Primary | ICD-10-CM

## 2025-05-14 DIAGNOSIS — F33.1 MODERATE EPISODE OF RECURRENT MAJOR DEPRESSIVE DISORDER: Chronic | ICD-10-CM

## 2025-05-14 PROCEDURE — 3079F DIAST BP 80-89 MM HG: CPT | Mod: CPTII,,, | Performed by: NURSE PRACTITIONER

## 2025-05-14 PROCEDURE — 3075F SYST BP GE 130 - 139MM HG: CPT | Mod: CPTII,,, | Performed by: NURSE PRACTITIONER

## 2025-05-14 PROCEDURE — 3066F NEPHROPATHY DOC TX: CPT | Mod: CPTII,,, | Performed by: NURSE PRACTITIONER

## 2025-05-14 PROCEDURE — 3062F POS MACROALBUMINURIA REV: CPT | Mod: CPTII,,, | Performed by: NURSE PRACTITIONER

## 2025-05-14 PROCEDURE — 1159F MED LIST DOCD IN RCRD: CPT | Mod: CPTII,,, | Performed by: NURSE PRACTITIONER

## 2025-05-14 PROCEDURE — 3051F HG A1C>EQUAL 7.0%<8.0%: CPT | Mod: CPTII,,, | Performed by: NURSE PRACTITIONER

## 2025-05-14 PROCEDURE — 1160F RVW MEDS BY RX/DR IN RCRD: CPT | Mod: CPTII,,, | Performed by: NURSE PRACTITIONER

## 2025-05-14 PROCEDURE — 99213 OFFICE O/P EST LOW 20 MIN: CPT | Mod: PBBFAC,PN | Performed by: NURSE PRACTITIONER

## 2025-05-14 PROCEDURE — 99215 OFFICE O/P EST HI 40 MIN: CPT | Mod: S$PBB,HB,SA, | Performed by: NURSE PRACTITIONER

## 2025-05-14 PROCEDURE — 3008F BODY MASS INDEX DOCD: CPT | Mod: CPTII,,, | Performed by: NURSE PRACTITIONER

## 2025-05-14 PROCEDURE — 4010F ACE/ARB THERAPY RXD/TAKEN: CPT | Mod: CPTII,,, | Performed by: NURSE PRACTITIONER

## 2025-05-14 RX ORDER — ALPRAZOLAM 0.25 MG/1
1 TABLET ORAL DAILY PRN
Qty: 30 TABLET | Refills: 3 | Status: SHIPPED | OUTPATIENT
Start: 2025-05-14 | End: 2025-05-15 | Stop reason: SDUPTHER

## 2025-05-14 RX ORDER — PAROXETINE 10 MG/1
10 TABLET, FILM COATED ORAL EVERY MORNING
Qty: 7 TABLET | Refills: 0 | Status: SHIPPED | OUTPATIENT
Start: 2025-05-14

## 2025-05-14 RX ORDER — FLUOXETINE 20 MG/1
20 CAPSULE ORAL DAILY
Qty: 7 CAPSULE | Refills: 0 | Status: SHIPPED | OUTPATIENT
Start: 2025-05-15

## 2025-05-14 RX ORDER — FLUOXETINE HYDROCHLORIDE 40 MG/1
40 CAPSULE ORAL DAILY
Qty: 30 CAPSULE | Refills: 3 | Status: SHIPPED | OUTPATIENT
Start: 2025-05-23

## 2025-05-14 NOTE — TELEPHONE ENCOUNTER
Attempted to call pharm. X2 was on hold for more than 15 min each call  Will try again tomorrow.    Attempted to call pt and there was no answer.  Anushka Oswald, BECKY  5/14/2025, 4:06 PM

## 2025-05-14 NOTE — TELEPHONE ENCOUNTER
----- Message from Nurse Emily sent at 5/14/2025  2:37 PM CDT -----  Regarding: FW: pharmacy call    ----- Message -----  From: Emilia Narayanan  Sent: 5/14/2025   1:55 PM CDT  To: Ralf CASTELLANOS Staff  Subject: pharmacy call                                    Who Called: Padmini - [facility] with [facility]Pharmacy name and phone number: [facility] contact: AmyPatient Name:  Anthony FrithPrescription Name:   What do they need to clarify?:She has a few questions about different medicationsPreferred Method of Contact: Phone CallPatient's Preferred Phone Number on File: [phone] Call Back Number, if different:Additional Information:
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
## 2025-05-15 DIAGNOSIS — F41.0 PANIC DISORDER: ICD-10-CM

## 2025-05-19 ENCOUNTER — TELEPHONE (OUTPATIENT)
Dept: FAMILY MEDICINE | Facility: CLINIC | Age: 40
End: 2025-05-19
Payer: MEDICAID

## 2025-05-19 NOTE — TELEPHONE ENCOUNTER
Please see the attached refill request.  Spoke to pharmacy  Insurance will only pay for Xanax 0.25 mg 3 times daily not 4 a day  Please advise

## 2025-05-19 NOTE — TELEPHONE ENCOUNTER
Copied from CRM #[id number]. Topic: General Inquiry - Return Call  >> May 19, 2025 10:49 AM Romelia wrote:  .Who Called: Anthony Hadley    Patient is returning phone call    Who Left Message for Patient:  Does the patient know what this is regarding?:PT returning callback from office about Xanax 0.25mg---pt stated he's okay with taking 3x per day pls fill medication       Preferred Method of Contact: Phone Call  Patient's Preferred Phone Number on File: [phone]   Best Call Back Number, if different:  Additional Information:

## 2025-05-20 RX ORDER — ALPRAZOLAM 0.5 MG/1
TABLET ORAL
Qty: 90 TABLET | Refills: 3 | Status: SHIPPED | OUTPATIENT
Start: 2025-05-20

## 2025-05-25 DIAGNOSIS — F90.9 ATTENTION DEFICIT HYPERACTIVITY DISORDER (ADHD), UNSPECIFIED ADHD TYPE: ICD-10-CM

## 2025-05-26 RX ORDER — DEXTROAMPHETAMINE SACCHARATE, AMPHETAMINE ASPARTATE, DEXTROAMPHETAMINE SULFATE AND AMPHETAMINE SULFATE 5; 5; 5; 5 MG/1; MG/1; MG/1; MG/1
1 TABLET ORAL 2 TIMES DAILY
Qty: 60 TABLET | Refills: 0 | Status: SHIPPED | OUTPATIENT
Start: 2025-05-26

## 2025-05-27 DIAGNOSIS — F41.1 GAD (GENERALIZED ANXIETY DISORDER): ICD-10-CM

## 2025-05-27 DIAGNOSIS — F41.0 PANIC DISORDER: Primary | ICD-10-CM

## 2025-05-27 RX ORDER — ALPRAZOLAM 0.5 MG/1
0.5 TABLET ORAL 3 TIMES DAILY PRN
Qty: 90 TABLET | Refills: 3 | Status: SHIPPED | OUTPATIENT
Start: 2025-05-27

## 2025-05-29 DIAGNOSIS — R42 DIZZINESS: ICD-10-CM

## 2025-05-29 RX ORDER — MECLIZINE HYDROCHLORIDE 25 MG/1
TABLET ORAL
Qty: 90 TABLET | Refills: 6 | Status: SHIPPED | OUTPATIENT
Start: 2025-05-29

## 2025-06-19 ENCOUNTER — OFFICE VISIT (OUTPATIENT)
Dept: FAMILY MEDICINE | Facility: CLINIC | Age: 40
End: 2025-06-19
Payer: MEDICAID

## 2025-06-19 VITALS
HEIGHT: 71 IN | WEIGHT: 165 LBS | SYSTOLIC BLOOD PRESSURE: 115 MMHG | RESPIRATION RATE: 18 BRPM | DIASTOLIC BLOOD PRESSURE: 82 MMHG | TEMPERATURE: 98 F | BODY MASS INDEX: 23.1 KG/M2 | OXYGEN SATURATION: 99 % | HEART RATE: 99 BPM

## 2025-06-19 DIAGNOSIS — R11.2 NAUSEA AND VOMITING, UNSPECIFIED VOMITING TYPE: ICD-10-CM

## 2025-06-19 DIAGNOSIS — E55.9 VITAMIN D DEFICIENCY: ICD-10-CM

## 2025-06-19 DIAGNOSIS — R51.9 NONINTRACTABLE EPISODIC HEADACHE, UNSPECIFIED HEADACHE TYPE: ICD-10-CM

## 2025-06-19 DIAGNOSIS — K31.84 DIABETIC GASTROPARESIS: ICD-10-CM

## 2025-06-19 DIAGNOSIS — E08.3313 MODERATE NONPROLIFERATIVE DIABETIC RETINOPATHY OF BOTH EYES WITH MACULAR EDEMA ASSOCIATED WITH DIABETES MELLITUS DUE TO UNDERLYING CONDITION: ICD-10-CM

## 2025-06-19 DIAGNOSIS — F33.1 MODERATE EPISODE OF RECURRENT MAJOR DEPRESSIVE DISORDER: Chronic | ICD-10-CM

## 2025-06-19 DIAGNOSIS — I15.8 OTHER SECONDARY HYPERTENSION: ICD-10-CM

## 2025-06-19 DIAGNOSIS — J32.4 CHRONIC PANSINUSITIS: ICD-10-CM

## 2025-06-19 DIAGNOSIS — F90.2 ATTENTION DEFICIT HYPERACTIVITY DISORDER (ADHD), COMBINED TYPE: ICD-10-CM

## 2025-06-19 DIAGNOSIS — H93.11 TINNITUS, RIGHT EAR: ICD-10-CM

## 2025-06-19 DIAGNOSIS — F41.1 GAD (GENERALIZED ANXIETY DISORDER): Chronic | ICD-10-CM

## 2025-06-19 DIAGNOSIS — R42 DIZZINESS: ICD-10-CM

## 2025-06-19 DIAGNOSIS — G62.9 NEUROPATHY: Primary | ICD-10-CM

## 2025-06-19 DIAGNOSIS — Z72.0 TOBACCO USE: ICD-10-CM

## 2025-06-19 DIAGNOSIS — E11.43 DIABETIC GASTROPARESIS: ICD-10-CM

## 2025-06-19 PROBLEM — L02.01 FACIAL ABSCESS: Status: RESOLVED | Noted: 2025-02-26 | Resolved: 2025-06-19

## 2025-06-19 PROCEDURE — 99215 OFFICE O/P EST HI 40 MIN: CPT | Mod: PBBFAC,PN | Performed by: NURSE PRACTITIONER

## 2025-06-19 RX ORDER — PANTOPRAZOLE SODIUM 40 MG/1
40 TABLET, DELAYED RELEASE ORAL DAILY
COMMUNITY
Start: 2025-06-17 | End: 2025-07-17

## 2025-06-19 RX ORDER — SUCRALFATE 1 G/1
1 TABLET ORAL 2 TIMES DAILY
COMMUNITY
Start: 2025-06-17 | End: 2025-06-24

## 2025-06-19 RX ORDER — AMITRIPTYLINE HYDROCHLORIDE 25 MG/1
25 TABLET, FILM COATED ORAL NIGHTLY
COMMUNITY
Start: 2025-05-29

## 2025-06-19 NOTE — ASSESSMENT & PLAN NOTE
Not able to fill Adderall due to being prescribed other meds by Cardiology. He will reach out to [facility] to get that cancelled so that he can fill his Adderall.   Pt seeing psych NP who will likely manage his ADHD meds.

## 2025-06-19 NOTE — PROGRESS NOTES
[facility]      Patient Name: Anthony Hadley     : 1985    MRN: [id number]     Subjective:     Patient ID: Anthony Hadley is a 39 y.o. male.    Chief Complaint:   Chief Complaint   Patient presents with    Follow-up     Pt is here for follow up. Pt reports [facility] visit for CP, SOB, sweating, and N/V. Newly diagnosed ulcer        HPI: 25:   6 month follow-up.  Recently diagnosed with ulcer in ER.  Pt did not fill medication yet.  He has GI doctor already who told him that the Adderall he takes is actually good for his gut.  He was dx with POTS by Cardiology. He did not complete the workup because he had insurance issues prior to stress test being completed.  He is going to call and schedule that to be done so that he can FU with the cardiologist.  He was put on meds to increase his BP and lower.  He filled the meds to increase his pressure but is now unable to fill the medication for ADHD and he states that he is suffering without it.  He needs to have Cardiology cancel the medication so the pharmacy will fill the Adderall.      Another issue that he is having is HA's that are  top of head and back of head, he describes as sudden throbbing/stabbing to his head which are pretty severe and are occurring more frequently. He has daily headaches already but these are new and he is not sure where they are coming from.      Last saw psych NP 25.      Pt has FU with Endocrine. He is going to [facility] [place] as a test subject for stem cells for pancreas to regenerate his pancreatic cells.  He will let us know further information as he gets it. He just got accepted into trial.       25: ER FU. Pt just went to ER 2 days ago with same c/o from March visit.  We had done a workup then and referred him to Cardiology. He was told in ER to FU with Cardiology as he has sx of POTS.  He has not seen Cardiology yet, appointment is in . He would like another referral to be sent possibly for a  sooner appointment.  Also is complaining of stress and anxiety today wanting referral to psych and asking for something to take the edge off until he can get to Cardiology for workup.    Today BP stable, denies n/v.  Unable to tolerate Paxil or other SSRIs. Workup in ER negative with EKG, CT and labs were unremarkable.     Chart review reveals patient was seen 03/27/2025.  Complaining of weakness fatigue nausea vomiting.  Follow up by endocrine.  Also at that time complaining of getting lightheaded and dizzy.  History of gastroparesis.  Was noted to have A1c of 7.7. [MM]     1124    Discussed findings with the patient.  Somewhat orthostatic here in the emergency department.  Will give additional L of fluid.  We will allow him to eat here in the emergency department.  His labs are unrevealing otherwise.  Reports he feels better at this time.  I believe he will be suitable for discharge discussed with the patient is comfortable with the plan we will obtain him close follow up with the cardiology possibly has something similar to POTS.  Return precautions given.  Questions invited, questions answered to the best my ability.  Patient discharged home condition stable.           3/27/25: Pt presents to clinic today c/o increased weakness, fatigue, N/V x 3 days.  Seen by Endocrine recently and c/o same issues.  Is having confusion episodes and he gets lightheaded and dizzy.  He has hx of anemia of chronic disease. C/o vomiting, abdominal pain, neck pain and HA's. He does have several allergies noted on his allergy panel.  He is established with ENT as well.       Cardiology-c/o orthopnea, SOB,  pt states that he has had a heart attack in the past.     Hx gastroparesis. Workup performed by Endocrinology  PA. Testosterone levels WNL.  Vitamin D mildly decreased at 24.  A1c is 7.7.   Kidney and liver function is WNL.                ROS:      Review of Systems   Constitutional:  Positive for malaise/fatigue.   Neurological:   Positive for dizziness and headaches.   Psychiatric/Behavioral:  The patient is nervous/anxious.         Panic attacks   All other systems reviewed and are negative.      History:     Health Maintenance         Date Due Completion Date    TETANUS VACCINE 07/01/2025 (Originally 12/30/2002) ---    Pneumococcal Vaccines (Age 0-49) (1 of 2 - PCV) 07/01/2025 (Originally 12/30/2003) ---    COVID-19 Vaccine (1 - 2024-25 season) 12/18/2025 (Originally 9/1/2024) ---    Hemoglobin A1c 09/25/2025 3/25/2025    Diabetic Eye Exam 11/15/2025 11/15/2024    Foot Exam 12/19/2025 12/19/2024 (Done)    Override on 12/19/2024: Done    Diabetes Urine Screening 03/25/2026 3/25/2025    Lipid Panel 03/25/2026 3/25/2025    Low Dose Statin 06/19/2026 6/19/2025    RSV Vaccine (Age 60+ and Pregnant patients) (1 - 1-dose 75+ series) 12/30/2059 ---            Past Medical History:   Diagnosis Date    Anxiety     Depression     Diabetes mellitus type I     Facial abscess 02/26/2025    Hyperkalemia 12/02/2022    Type 1 diabetes mellitus with diabetic polyneuropathy 05/23/2022        Past Surgical History:   Procedure Laterality Date    APPENDECTOMY      CHOLECYSTECTOMY      ETHMOIDECTOMY  12/13/2024    Procedure: ETHMOIDECTOMY;  Surgeon: Yohan Cartwright MD;  Location: [facility];  Service: ENT;;    FOOT SURGERY      bone spure and fracture    FUNCTIONAL ENDOSCOPIC SINUS SURGERY (FESS) Bilateral 12/13/2024    Procedure: ANTERIOR FESS, TURBINATE REDUCTION;  Surgeon: Yohan Cartwright MD;  Location: [facility] OR;  Service: ENT;  Laterality: Bilateral;  No Navigation    NASAL SEPTOPLASTY  12/13/2024    Procedure: SEPTOPLASTY, NOSE;  Surgeon: Yohan Cartwright MD;  Location: [facility] OR;  Service: ENT;;       Family History   Problem Relation Name Age of Onset    Heart attack Mother      Heart disease Father      Diabetes Father      Crohn's disease Father          Social History     Tobacco Use    Smoking status: Former     Current packs/day: 0.00     Types:  "Cigarettes     Quit date: 2009     Years since quittin.3    Smokeless tobacco: Never    Tobacco comments:     Medical weed     Pt states stopped smoking cigs in years (15 years)    Substance and Sexual Activity    Alcohol use: Not Currently     Comment: socially    Drug use: Yes     Types: Marijuana     Comment: medically prescribed to prevent nausea    Sexual activity: Yes       Current Outpatient Medications   Medication Instructions    ALPRAZolam (XANAX) 0.5 mg, Oral, 3 times daily PRN    amitriptyline (ELAVIL) 25 mg, Nightly    dextroamphetamine-amphetamine (ADDERALL) 20 mg tablet 1 tablet, Oral, 2 times daily    FLUoxetine 40 mg, Oral, Daily    fluticasone propionate (FLONASE) 50 mcg, Each Nostril, Daily    insulin lispro (HUMALOG U-100 INSULIN) 100 unit/mL injection Use 52 to 75 units max in insulin pump  Max daily dose 100 units.    lisinopriL (PRINIVIL,ZESTRIL) 5 mg, Oral, Daily    loratadine (CLARITIN) 10 mg, Oral, Daily    meclizine (ANTIVERT) 25 mg tablet TAKE 1 TABLET BY MOUTH 3 TIMES DAILY AS NEEDED FOR DIZZINESS.    metoclopramide HCl (REGLAN) 5 mg, Oral, 4 times daily    mupirocin (BACTROBAN) 2 % ointment Topical (Top), 3 times daily    NON FORMULARY MEDICATION Pt states uses Medical Marijuana as needed    pantoprazole (PROTONIX) 40 mg, Daily    promethazine (PHENERGAN) 25 mg, Topical (Top), Every 6 hours PRN    rosuvastatin (CRESTOR) 5 mg, Oral, Daily    sucralfate (CARAFATE) 1 g, 2 times daily        Review of patient's allergies indicates:   Allergen Reactions    Meropenem Anaphylaxis     Cardiac arrest     Pcn [penicillins] Hives    Topamax [topiramate] Hives       Patient Care Team:  Nasrin Arambula FNP as PCP - General (Family Medicine)    Objective:     Visit Vitals  /82   Pulse 99   Temp 98 °F (36.7 °C) (Oral)   Resp 18   Ht 5' 11" (1.803 m)   Wt 74.8 kg (165 lb)   SpO2 99%   BMI 23.01 kg/m²       Physical Examination:     Physical Exam  Vitals reviewed.   Constitutional:       " Appearance: Normal appearance. He is normal weight.   HENT:      Head: Normocephalic.   Cardiovascular:      Rate and Rhythm: Normal rate and regular rhythm.      Pulses: Normal pulses.      Heart sounds: Normal heart sounds.   Pulmonary:      Effort: Pulmonary effort is normal.      Breath sounds: Normal breath sounds.   Abdominal:      General: Abdomen is flat.      Palpations: Abdomen is soft.   Musculoskeletal:         General: Normal range of motion.      Cervical back: Normal range of motion.   Skin:     General: Skin is warm and dry.   Neurological:      Mental Status: He is alert.   Psychiatric:         Mood and Affect: Mood normal.         Lab Results:     Chemistry:  Lab Results   Component Value Date     04/30/2025    K 4.5 04/30/2025    BUN 20.7 (H) 04/30/2025    CREATININE 1.11 04/30/2025    EGFRNORACEVR >60 04/30/2025    CALCIUM 9.0 04/30/2025    ALKPHOS 73 04/30/2025    LABPROT 12.6 01/17/2023    ALBUMIN 3.5 04/30/2025    AST 19 04/30/2025    ALT 14 04/30/2025    MG 2.00 04/30/2025    LAYRWHQO25TV 24 (L) 03/25/2025    TSH 2.311 04/30/2025    WZSTWL5EHAY 1.13 07/01/2024        Lab Results   Component Value Date    HGBA1C 7.7 (H) 03/25/2025        Hematology:  Lab Results   Component Value Date    WBC 11.23 04/30/2025    HGB 13.2 (L) 04/30/2025    HCT 41.0 (L) 04/30/2025     04/30/2025       Lipid Panel:  Lab Results   Component Value Date    CHOL 192 03/25/2025    HDL 63 (H) 03/25/2025    .00 03/25/2025    TRIG 77 03/25/2025    TOTALCHOLEST 3 03/25/2025        Urine:  Lab Results   Component Value Date    APPEARANCEUA Clear 04/30/2025    SGUA 1.022 04/30/2025    PROTEINUA 3+ (A) 04/30/2025    KETONESUA Negative 04/30/2025    LEUKOCYTESUR Negative 04/30/2025    RBCUA 0-5 04/30/2025    WBCUA 0-5 04/30/2025    BACTERIA None Seen 04/30/2025    SQEPUA None Seen 04/30/2025    HYALINECASTS None Seen 07/01/2024    CREATRANDUR 138.4 03/25/2025    PROTEINURINE 61.5 02/04/2024         Assessment:          ICD-10-CM ICD-9-CM   1. Neuropathy  G62.9 355.9   2. Diabetic gastroparesis  E11.43 250.60    K31.84 536.3   3. Moderate episode of recurrent major depressive disorder  F33.1 296.32   4. ZEB (generalized anxiety disorder)  F41.1 300.02   5. Attention deficit hyperactivity disorder (ADHD), combined type  F90.2 314.01   6. 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 edema associated with diabetes mellitus due to underlying condition  E08.3313 249.50     362.07     362.01   7. Tinnitus, right ear  H93.11 388.30   8. Other secondary hypertension  I15.8 405.99   9. Vitamin D deficiency  E55.9 268.9   10. Nausea and vomiting, unspecified vomiting type  R11.2 787.01   11. Tobacco use  Z72.0 305.1   12. Dizziness  R42 780.4   13. Nonintractable episodic headache, unspecified headache type  R51.9 784.0   14. Chronic pansinusitis  J32.4 473.8          Plan:     1. Neuropathy  Assessment & Plan:  Discussed trying Nerve Freedom OTC for neuropathy pain.      2. Diabetic gastroparesis  Assessment & Plan:  Pt followed by GI      3. Moderate episode of recurrent major depressive disorder  Assessment & Plan:  Followed by psych NP      4. ZEB (generalized anxiety disorder)  Assessment & Plan:  Followed by Psych NP      5. Attention deficit hyperactivity disorder (ADHD), combined type  Assessment & Plan:  Not able to fill Adderall due to being prescribed other meds by Cardiology. He will reach out to [facility] to get that cancelled so that he can fill his Adderall.   Pt seeing psych NP who will likely manage his ADHD meds.       6. 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 edema associated with diabetes mellitus due to underlying condition  Assessment & Plan:  Upcoming eye appointment      7. Tinnitus, right ear  Assessment & Plan:  Followed by ENT    Orders:  -     MRI Brain Without Contrast; Future; Expected date: 06/19/2025    8. Other secondary  hypertension  Overview:  BP Readings from Last 3 Encounters:   06/19/25 115/82   05/14/25 138/86   05/02/25 119/78         Assessment & Plan:  Chronic, stable issue today. Followed by [facility]      9. Vitamin D deficiency  Assessment & Plan:  Vitamin D low. He was started on D3 50,000 IU. Managed by PCP      10. Nausea and vomiting, unspecified vomiting type  Assessment & Plan:  Previously prescribed topical promethazine        11. Tobacco use  Assessment & Plan:  Former smoker.      12. Dizziness  Assessment & Plan:  Pt has seen [facility] and has workup in progress.      Orders:  -     MRI Brain Without Contrast; Future; Expected date: 06/19/2025  -     Ambulatory referral/consult to Neurology; Future; Expected date: 06/26/2025    13. Nonintractable episodic headache, unspecified headache type  Assessment & Plan:  MRI brain ordered.  Refer to Neurology    Orders:  -     MRI Brain Without Contrast; Future; Expected date: 06/19/2025  -     Ambulatory referral/consult to Neurology; Future; Expected date: 06/26/2025    14. Chronic pansinusitis           Follow up in about 6 months (around 12/19/2025) for Wellness..  In addition to their scheduled follow up, the patient has also been instructed to follow up on as needed basis.       Future Appointments   Date Time Provider Department Center   6/23/2025  8:00 AM Lori Harris MD [facility] [place]    7/21/2025  9:00 AM Angie Arnold APRN [facility]   8/4/2025  9:00 AM Darya Narayanan PA-C [facility]   8/14/2025  2:00 PM Angie Arnold APRN [facility]   9/15/2025  2:10 PM [facility] [place]    12/19/2025  8:00 AM Nasrin Arambula FNP [facility]        [place]

## 2025-06-19 NOTE — PATIENT INSTRUCTIONS
Obstetrics & Gynecology History & Physical Note    Date of Service  2022    Chief Complaint  Induction due to chronic hypertension. Denies any contractions, leaking, bleeding or decreased FM.     History of Presenting Illness  Priti Begum is a 35 y.o.  at 38w0d 2022, by Ultrasound. Patient's last menstrual period was 2021 (exact date). She is being admitted for induction of labor    Prenatal care is at [facility] and is complicated by:    Patient Active Problem List    Diagnosis Date Noted    Pre-existing type 2 diabetes mellitus during pregnancy in third trimester 2022    Multigravida of advanced maternal age in second trimester 2022    Long term (current) use of oral hypoglycemic drugs 2020    Metabolic syndrome 2020    Controlled type 2 diabetes mellitus without complication, without long-term current use of insulin (HCC) 2019    Vitamin D deficiency 2019    Hypertension due to endocrine disorder 2019    Fatty liver disease, nonalcoholic 2016    Morbidly obese (HCC) 10/31/2013    PCOS (polycystic ovarian syndrome) 10/31/2013       Obstetric History  OB History    Para Term  AB Living   1 0 0 0 0 0   SAB IAB Ectopic Molar Multiple Live Births   0 0 0   0        # Outcome Date GA Lbr Jaison/2nd Weight Sex Delivery Anes PTL Lv   1 Current                Gynecologic History  Pap neg     Review of Systems  ROS    Medical History   has a past medical history of Diabetes ([facility]) and Hypertension.    She has no past medical history of Allergy, Arrhythmia, Asthma, or Cancer ([facility]).    Surgical History   has no past surgical history on file.     Family History  family history includes Diabetes in her maternal grandmother and mother; Hyperlipidemia in her mother; Hypertension in her mother; Kidney Disease in her mother; Lung Disease in her maternal grandmother; No Known Problems in her brother, brother, father, and sister.  Pankaj Cruz,     If you are due for any health screening(s) below please notify me so we can arrange them to be ordered and scheduled. Most healthy patients at your age complete them, but you are free to accept or refuse.     If you can't do it, I'll definitely understand. If you can, I'd certainly appreciate it!    All of your core healthy metrics are met.                    "    Social History   reports that she has never smoked. She has never used smokeless tobacco. She reports that she does not drink alcohol and does not use drugs.    Allergies  Allergies   Allergen Reactions    Glimepiride [Kdc:Yellow Dye+Brilliant Blue Fcf+Glimepiride] Unspecified     \"gittery\"       Medications  Prior to Admission Medications   Prescriptions Last Dose Informant Patient Reported? Taking?   Blood Glucose Monitoring Suppl (ONETOUCH VERIO FLEX SYSTEM) w/Device Kit   No No   Sig: Use as directed   Blood Glucose Test Strips   No No   Sig: Testing before and 1 hour after meals for insulin adjustment during pregnancy. One Touch Verio test strips   Continuous Blood Gluc Sensor (FREESTYLE CORA 2 SENSOR) [facility] [place]   No No   Sig: Use 1 sensor as directed every 14 days.   Insulin Degludec (TRESIBA FLEXTOUCH) 100 UNIT/ML Solution Pen-injector   No No   Sig: Inject 37 units at bedtime   Insulin Degludec 100 UNIT/ML Solution Pen-injector   No No   Sig: Inject 30 Units under the skin at bedtime.   Patient taking differently: Inject 37 Units under the skin at bedtime.   Insulin NPH, Human,, Isophane, (HUMULIN N KWIKPEN) 100 UNIT/ML Suspension Pen-injector   No No   Sig: Inject 15 Units under the skin every day.   Insulin Pen Needle 32 G x 4 mm   No No   Sig: Use as directed subcutaneously four times daily   Lancets   No No   Sig: Lancets order: Lancets for One Touch Delica.  Use 6 times daily for blood sugar testing.   Microlet Lancets Misc   No No   Sig: Use as directed by fingerstick 4-5 times a day for 30 days.   ONETOUCH VERIO strip   No No   Sig: Use to test blood sugar 5 Times a Day.   Prenatal MV & Min w/FA-DHA (PRENATAL ADULT GUMMY/DHA/FA) 0.4-25 MG Chew Tab   Yes No   Sig: Chew.   aspirin (ASA) 81 MG Chew Tab chewable tablet   Yes No   Sig: Chew 81 mg every day.   glucose blood strip   No No   Sig: Use as directed subcutaneously 4-5 times per day 30 days   glucose blood strip   No No   Sig: Use as directed " "subcutaneously 4-5 times a day for 30 days.   hydrOXYzine HCl (ATARAX) 25 MG Tab   No No   Sig: Take 1 Tab by mouth 3 times a day as needed for Anxiety.   insulin lispro (HUMALOG KWIKPEN) 100 UNIT/ML Solution Pen-injector injection PEN   No No   Sig: Inject 4-10 Units under the skin 3 times a day before meals.   labetalol (NORMODYNE) 200 MG Tab   No No   Sig: Take 1 Tablet by mouth 2 times a day.   metFORMIN ER (GLUCOPHAGE XR) 500 MG TABLET SR 24 HR   No No   Sig: Take 2 Tablets by mouth 2 times a day.   triamcinolone acetonide (KENALOG) 0.1 % Cream   No No   Sig: Apply once or twice daily to affected area of leg rash for 2 weeks   vitamin D (CHOLECALCIFEROL) 1000 UNIT Tab   Yes No   Sig: Take 1,000 Units by mouth every day.      Facility-Administered Medications: None       Physical Exam  Vitals:    08/31/22 2105 08/31/22 2115 08/31/22 2135   BP: (!) 148/82 (!) 147/84 133/76   Pulse: 94 93 88   Resp:   16   Temp:   36.2 °C (97.2 °F)   TempSrc:   Temporal   SpO2:   95%   Weight:   (!) 171 kg (377 lb)   Height:   1.575 m (5' 2\")       General:   alert, cooperative, no distress   Skin:   normal   HEENT:  extraocular movements intact   Lungs:   clear to auscultation bilaterally   Heart:   regular rate and rhythm   Breasts:   deferred   Abdomen:  Abdomen soft, non-tender; gravid.   Pelvis: Exam deferred.   FHT:  140 BPM Fetal heart variability: moderate   [place]: External US   Presentations:   Vtx per BSUS   Cervix:     Dilation:  closed    Effacement:  long    Station:   high    Consistency:      Position:         Laboratory:  Prenatal Results       General (Most Recent Result)       Test Value Reference Range Date Time    ABO  O   03/25/22 0853    Rh  POS   03/25/22 0853    Antibody screen  NEG   03/25/22 0853    HbA1c  5.4 % 4.0 - 5.6 07/29/22 1435    Chlamydia by PCR  Negative  Negative 03/25/22 0853    Gonorrhea by PCR  Negative  Negative 03/25/22 0853    RPR/Syphilus  Non-Reactive  Non-Reactive 05/25/22 0650    HSV " 1/2 by PCR (non-serum)        HSV 1/2 (serum)        HSV 1        HSV 2        HPV (16)  Negative  Negative 01/10/20 0908    HBsAg  Non-Reactive  Non-Reactive 03/25/22 0853    HIV-1 HIV-2 Antibodies  Non-Reactive  Non Reactive 03/25/22 0853    Rubella  16.60 IU/mL  03/25/22 0853    Tb                  Pap Smear (Most Recent Result)       Test Value Reference Range Date Time    Pap smear        Pap smear w/HPV        Pap smear w/CTNG        Pap smar w/HPV CTNG  (See Report)    01/10/20 0908    Pap smear (reflex HPV ACUS)        Pap smear (reflex HPV ASCUS w/CTNG)        Pathology gyn specimen  (See Report)    01/10/20 0908              Urinalysis (Most Recent Result)       Test Value Reference Range Date Time    Urinalysis        POC urinalysis  (See Report)    04/02/20 1202    Urine drug screen (w/ conf)        Urine culture ([id number])        Urine Protein/Creatinine Ratio                  Urinalysis, Culture if indicated       Test Value Reference Range Date Time    Color  Yellow   03/25/22 0853    Appearance  Clear   03/25/22 0853    Specific Gravity  1.025  <1.035 03/25/22 0853    PH  7.0  5.0 - 8.0 03/25/22 0853    Glucose  Negative mg/dL Negative 03/25/22 0853    Ketones  Negative mg/dL Negative 03/25/22 0853    Protein  Negative mg/dL Negative 03/25/22 0853    Bilirubin  Negative  Negative 03/25/22 0853    Nitrites  Negative  Negative 03/25/22 0853    Leukocytes Esterase  Negative  Negative 03/25/22 0853    Blood  Negative  Negative 03/25/22 0853    Comment  see below   03/25/22 0853    Culture                  Urine Drug Screen       Test Value Reference Range Date Time    Amphetamines        Barbiturates        Benzodiazepines        Cocaine        Methadone        Opiates        Oxycodone        Phencylidine        Propoxyphene        Marijuana Metabolite                  1st Trimester       Test Value Reference Range Date Time    Hgb  13.4 g/dL 12.0 - 16.0 02/24/22 0621    Hct  41.3 % 37.0 - 47.0  22 0621    Fasting Glucose Tolerance        GTT, 1 hour        GTT, 2 hours        GTT, 3 hours                  2nd Trimester       Test Value Reference Range Date Time    Hgb  11.8 g/dL 12.0 - 16.0 22 0650       12.3 g/dL 12.0 - 16.0 22 0853    Hct  36.6 % 37.0 - 47.0 22 0650       38.0 % 37.0 - 47.0 22 0853    AST  20 U/L 12 - 45 22 0650       16 U/L 12 - 45 22 0852    ALT  24 U/L 2 - 50 22 0650       26 U/L 2 - 50 22 0852    Uric Acid        Fasting Glucose Tolerance        GTT, 1 hour        GTT, 2 hours        GTT, 3 hours                  3rd Trimester       Test Value Reference Range Date Time    Hgb  11.0 g/dL 12.0 - 16.0 22 0833    Hct  34.6 % 37.0 - 47.0 22 0833    Platelet count  268 K/uL 164 - 446 22 0833    GBS (DAVIS BROTH)  Negative  Negative 22 1559    Fasting Glucose Tolerance        GTT, 1 hour        GTT, 2 hous        GTT, 3hours                  Congenital Disease Screening       Test Value Reference Range Date Time    First Trimester Screen        Quad Screen        BH Electrophoresis        Cystic Fibrosis Carrier Study        SMA        AFP Maternal Serum         AFP Tetra        NIPT                  Legend    ^: Historical                              Urinalysis:    No results found     Imaging:  No orders to display         Assessment:  35 y.o.  38w0d who presents with  Pregnancy    Plan:  No new Assessment & Plan notes have been filed under this hospital service since the last note was generated.  Service: Obstetrics & Gynecology    1. Admit to L&D  2. GBS: negative  3. Pain Control: tbd   4. Pre E labs ordered  5. Cervidil placed at 23:40  6. After consult with Shira and [facility] will plan for half of pt's Tresiba (19) nightly insulin dose and full dose of NPH (10) tonight, pt has already taken her metformin and labetalol at home this evening; will plan for continuing her metformin 1000mg BID. Will reassess  in the am pending pt's meal consumption and plan for insulin drip in more active labor.   7. Finger sticks q 4hr in early labor     VTE prophylaxis: n/a    KRISHAN Johnson.

## 2025-06-20 ENCOUNTER — TELEPHONE (OUTPATIENT)
Dept: FAMILY MEDICINE | Facility: CLINIC | Age: 40
End: 2025-06-20
Payer: MEDICAID

## 2025-06-20 NOTE — TELEPHONE ENCOUNTER
Copied from CRM #[id number]. Topic: General Inquiry - Patient Advice  >> Jun 20, 2025 12:05 PM Sultana wrote:  .Who Called: Chrissie from [facility]    Caller is requesting assistance/information from provider's office.    Symptoms (please be specific): giving PA information for the MRI scan of brain was approved with [facility]   PA number is [id number] good from 7/1/25-8/29/25   How long has patient had these symptoms:  n/a   List of preferred pharmacies on file (remove unneeded): [unfilled]  If different, enter pharmacy into here including location and phone number: n/a       Preferred Method of Contact: Phone Call  Patient's Preferred Phone Number on File: [phone]  Best Call Back Number, if different:  Additional Information:

## 2025-06-30 DIAGNOSIS — F90.9 ATTENTION DEFICIT HYPERACTIVITY DISORDER (ADHD), UNSPECIFIED ADHD TYPE: ICD-10-CM

## 2025-07-01 RX ORDER — DEXTROAMPHETAMINE SACCHARATE, AMPHETAMINE ASPARTATE, DEXTROAMPHETAMINE SULFATE AND AMPHETAMINE SULFATE 5; 5; 5; 5 MG/1; MG/1; MG/1; MG/1
1 TABLET ORAL 2 TIMES DAILY
Qty: 60 TABLET | Refills: 0 | Status: SHIPPED | OUTPATIENT
Start: 2025-07-01

## 2025-07-17 ENCOUNTER — TELEPHONE (OUTPATIENT)
Dept: BEHAVIORAL HEALTH | Facility: CLINIC | Age: 40
End: 2025-07-17
Payer: MEDICAID

## 2025-08-05 ENCOUNTER — OFFICE VISIT (OUTPATIENT)
Dept: FAMILY MEDICINE | Facility: CLINIC | Age: 40
End: 2025-08-05
Payer: MEDICAID

## 2025-08-05 VITALS
RESPIRATION RATE: 18 BRPM | TEMPERATURE: 98 F | HEART RATE: 81 BPM | WEIGHT: 171 LBS | DIASTOLIC BLOOD PRESSURE: 65 MMHG | BODY MASS INDEX: 23.94 KG/M2 | OXYGEN SATURATION: 98 % | HEIGHT: 71 IN | SYSTOLIC BLOOD PRESSURE: 118 MMHG

## 2025-08-05 DIAGNOSIS — F90.9 ATTENTION DEFICIT HYPERACTIVITY DISORDER (ADHD), UNSPECIFIED ADHD TYPE: Primary | ICD-10-CM

## 2025-08-05 DIAGNOSIS — G89.29 CHRONIC PAIN OF RIGHT ANKLE: ICD-10-CM

## 2025-08-05 DIAGNOSIS — M25.571 CHRONIC PAIN OF RIGHT ANKLE: ICD-10-CM

## 2025-08-05 DIAGNOSIS — M25.551 BILATERAL HIP PAIN: ICD-10-CM

## 2025-08-05 DIAGNOSIS — M25.552 BILATERAL HIP PAIN: ICD-10-CM

## 2025-08-05 PROCEDURE — 3074F SYST BP LT 130 MM HG: CPT | Mod: CPTII,,, | Performed by: NURSE PRACTITIONER

## 2025-08-05 PROCEDURE — 3078F DIAST BP <80 MM HG: CPT | Mod: CPTII,,, | Performed by: NURSE PRACTITIONER

## 2025-08-05 PROCEDURE — 3062F POS MACROALBUMINURIA REV: CPT | Mod: CPTII,,, | Performed by: NURSE PRACTITIONER

## 2025-08-05 PROCEDURE — 99214 OFFICE O/P EST MOD 30 MIN: CPT | Mod: S$PBB,,, | Performed by: NURSE PRACTITIONER

## 2025-08-05 PROCEDURE — 4010F ACE/ARB THERAPY RXD/TAKEN: CPT | Mod: CPTII,,, | Performed by: NURSE PRACTITIONER

## 2025-08-05 PROCEDURE — 3008F BODY MASS INDEX DOCD: CPT | Mod: CPTII,,, | Performed by: NURSE PRACTITIONER

## 2025-08-05 PROCEDURE — 3066F NEPHROPATHY DOC TX: CPT | Mod: CPTII,,, | Performed by: NURSE PRACTITIONER

## 2025-08-05 PROCEDURE — 99214 OFFICE O/P EST MOD 30 MIN: CPT | Mod: PBBFAC,PN | Performed by: NURSE PRACTITIONER

## 2025-08-05 PROCEDURE — 3051F HG A1C>EQUAL 7.0%<8.0%: CPT | Mod: CPTII,,, | Performed by: NURSE PRACTITIONER

## 2025-08-05 PROCEDURE — 1159F MED LIST DOCD IN RCRD: CPT | Mod: CPTII,,, | Performed by: NURSE PRACTITIONER

## 2025-08-05 PROCEDURE — 1160F RVW MEDS BY RX/DR IN RCRD: CPT | Mod: CPTII,,, | Performed by: NURSE PRACTITIONER

## 2025-08-05 RX ORDER — DEXTROAMPHETAMINE SACCHARATE, AMPHETAMINE ASPARTATE, DEXTROAMPHETAMINE SULFATE AND AMPHETAMINE SULFATE 5; 5; 5; 5 MG/1; MG/1; MG/1; MG/1
1 TABLET ORAL 2 TIMES DAILY
Qty: 60 TABLET | Refills: 0 | Status: SHIPPED | OUTPATIENT
Start: 2025-08-05

## 2025-08-05 NOTE — ASSESSMENT & PLAN NOTE
Adderall 20 mg BID to pharmacy today.  Pharmacy called and ok'd dispense of medication, pt will FU with KATARINA Arnold NP on 8/14/25 and she will take over prescribing his Adderall moving forward.

## 2025-08-05 NOTE — PROGRESS NOTES
[facility]      Patient Name: Anthony Hadley     : 1985    MRN: [id number]     Subjective:     Patient ID: Anthony Hadley is a 39 y.o. male.    Chief Complaint:   Chief Complaint   Patient presents with    Follow-up     Pt has been unable to get Adderall filled. Pt also c/o right hip and left ankle pain x two days        HPI: 25: Pt has been unable to get Adderall filled due to being on Xanax. Pharmacy phoned to dispense medication today.     Pt also c/o bilateral hip pain and right ankle pain x two days that is chronic in nature from broken ankle years ago.  Hx hardware in ankle. Denies re-injuring. Is only asking for a chiropractor referral today.  He declined x-rays today.           ROS:      Review of Systems   Constitutional: Negative.    HENT: Negative.     Eyes: Negative.    Respiratory: Negative.     Cardiovascular: Negative.    Gastrointestinal: Negative.    Genitourinary: Negative.    Musculoskeletal:  Positive for joint pain and myalgias.   Skin: Negative.    Neurological: Negative.    Endo/Heme/Allergies: Negative.    Psychiatric/Behavioral:  The patient is nervous/anxious and has insomnia.    All other systems reviewed and are negative.      12 point review of systems conducted, negative except as stated in the history of present illness. See HPI for details.    History:     Health Maintenance         Date Due Completion Date    Low Dose Statin Never done ---    COVID-19 Vaccine ( - - season) 2025 (Originally 2024) ---    TETANUS VACCINE 2026 (Originally 2002) ---    Pneumococcal Vaccines (Age 0-49) (1 of 2 - PCV) 2026 (Originally 2003) ---    Influenza Vaccine (1) 2025 10/2/2024    Hemoglobin A1c 2025 3/25/2025    Diabetic Eye Exam 11/15/2025 11/15/2024    Foot Exam 2025 (Done)    Override on 2024: Done    Diabetes Urine Screening 2026 3/25/2025    Lipid Panel 2026 3/25/2025     RSV Vaccine (Age 60+ and Pregnant patients) (1 - 1-dose 75+ series) 2059 ---            Past Medical History:   Diagnosis Date    Anxiety     Depression     Diabetes mellitus type I     Facial abscess 2025    Hyperkalemia 2022    Type 1 diabetes mellitus with diabetic polyneuropathy 2022        Past Surgical History:   Procedure Laterality Date    APPENDECTOMY      CHOLECYSTECTOMY      ETHMOIDECTOMY  2024    Procedure: ETHMOIDECTOMY;  Surgeon: Yohan Cartwright MD;  Location: [facility];  Service: ENT;;    FOOT SURGERY      bone spure and fracture    FUNCTIONAL ENDOSCOPIC SINUS SURGERY (FESS) Bilateral 2024    Procedure: ANTERIOR FESS, TURBINATE REDUCTION;  Surgeon: Yohan Cartwright MD;  Location: [facility];  Service: ENT;  Laterality: Bilateral;  No Navigation    NASAL SEPTOPLASTY  2024    Procedure: SEPTOPLASTY, NOSE;  Surgeon: Yohan Cartwright MD;  Location: [facility];  Service: ENT;;       Family History   Problem Relation Name Age of Onset    Heart attack Mother      Heart disease Father      Diabetes Father      Crohn's disease Father          Social History     Tobacco Use    Smoking status: Former     Current packs/day: 0.00     Types: Cigarettes     Quit date: 2009     Years since quittin.5    Smokeless tobacco: Never    Tobacco comments:     Medical weed     Pt states stopped smoking cigs in years (15 years)    Substance and Sexual Activity    Alcohol use: Not Currently     Comment: socially    Drug use: Yes     Types: Marijuana     Comment: medically prescribed to prevent nausea    Sexual activity: Yes       Current Outpatient Medications   Medication Instructions    ALPRAZolam (XANAX) 0.5 mg, Oral, 3 times daily PRN    amitriptyline (ELAVIL) 25 mg, Nightly    dextroamphetamine-amphetamine (ADDERALL) 20 mg tablet 1 tablet, Oral, 2 times daily    FLUoxetine 40 mg, Oral, Daily    fluticasone propionate (FLONASE) 50 mcg, Each Nostril, Daily    insulin lispro  "(HUMALOG U-100 INSULIN) 100 unit/mL injection Use 52 to 75 units max in insulin pump  Max daily dose 100 units.    lisinopriL (PRINIVIL,ZESTRIL) 5 mg, Oral, Daily    loratadine (CLARITIN) 10 mg, Oral, Daily    meclizine (ANTIVERT) 25 mg tablet TAKE 1 TABLET BY MOUTH 3 TIMES DAILY AS NEEDED FOR DIZZINESS.    mupirocin (BACTROBAN) 2 % ointment Topical (Top), 3 times daily    NON FORMULARY MEDICATION Pt states uses Medical Marijuana as needed    pantoprazole (PROTONIX) 40 mg, Daily    promethazine (PHENERGAN) 25 mg, Topical (Top), Every 6 hours PRN    rosuvastatin (CRESTOR) 5 mg, Oral, Daily        Review of patient's allergies indicates:   Allergen Reactions    Meropenem Anaphylaxis     Cardiac arrest     Pcn [penicillins] Hives    Topamax [topiramate] Hives       Patient Care Team:  Nasrin Arambula FNP as PCP - General (Family Medicine)    Objective:     Visit Vitals  /65   Pulse 81   Temp 98 °F (36.7 °C) (Oral)   Resp 18   Ht 5' 11" (1.803 m)   Wt 77.6 kg (171 lb)   SpO2 98%   BMI 23.85 kg/m²       Physical Examination:     Physical Exam  Vitals reviewed.   Constitutional:       Appearance: Normal appearance. He is normal weight.   HENT:      Head: Normocephalic.   Cardiovascular:      Rate and Rhythm: Normal rate and regular rhythm.      Pulses: Normal pulses.      Heart sounds: Normal heart sounds.   Pulmonary:      Effort: Pulmonary effort is normal.      Breath sounds: Normal breath sounds.   Abdominal:      General: Abdomen is flat.      Palpations: Abdomen is soft.   Musculoskeletal:         General: Normal range of motion.      Cervical back: Normal and normal range of motion.      Thoracic back: Normal.      Lumbar back: Normal.      Right hip: Tenderness present. No deformity or lacerations. Normal range of motion.      Left hip: Tenderness present.      Right ankle: Tenderness present.   Skin:     General: Skin is warm and dry.   Neurological:      Mental Status: He is alert.   Psychiatric:         " Mood and Affect: Mood normal.         Lab Results:     Chemistry:  Lab Results   Component Value Date     04/30/2025    K 4.5 04/30/2025    BUN 20.7 (H) 04/30/2025    CREATININE 1.11 04/30/2025    EGFRNORACEVR >60 04/30/2025    CALCIUM 9.0 04/30/2025    ALKPHOS 73 04/30/2025    LABPROT 12.6 01/17/2023    ALBUMIN 3.5 04/30/2025    AST 19 04/30/2025    ALT 14 04/30/2025    MG 2.00 04/30/2025    [id number] 24 (L) 03/25/2025    TSH 2.311 04/30/2025    [id number] 1.13 07/01/2024        Lab Results   Component Value Date    HGBA1C 7.7 (H) 03/25/2025        Hematology:  Lab Results   Component Value Date    WBC 11.23 04/30/2025    HGB 13.2 (L) 04/30/2025    HCT 41.0 (L) 04/30/2025     04/30/2025       Lipid Panel:  Lab Results   Component Value Date    CHOL 192 03/25/2025    HDL 63 (H) 03/25/2025    TRIG 77 03/25/2025    TOTALCHOLEST 3 03/25/2025        Urine:  Lab Results   Component Value Date    APPEARANCEUA Clear 04/30/2025    SGUA 1.022 04/30/2025    PROTEINUA 3+ (A) 04/30/2025    KETONESUA Negative 04/30/2025    LEUKOCYTESUR Negative 04/30/2025    RBCUA 0-5 04/30/2025    WBCUA 0-5 04/30/2025    BACTERIA None Seen 04/30/2025    SQEPUA None Seen 04/30/2025    HYALINECASTS None Seen 07/01/2024    CREATRANDUR 138.4 03/25/2025    PROTEINURINE 61.5 02/04/2024        Assessment:          ICD-10-CM ICD-9-CM   1. Attention deficit hyperactivity disorder (ADHD), unspecified ADHD type  F90.9 314.01   2. Bilateral hip pain  M25.551 719.45    M25.552    3. Chronic pain of right ankle  M25.571 719.47    G89.29 338.29          Plan:     1. Attention deficit hyperactivity disorder (ADHD), unspecified ADHD type  Assessment & Plan:  Adderall 20 mg BID to pharmacy today.  Pharmacy called and ok'd dispense of medication, pt will FU with KATARINA Arnold NP on 8/14/25 and she will take over prescribing his Adderall moving forward.       Orders:  -     dextroamphetamine-amphetamine (ADDERALL) 20 mg tablet; Take 1 tablet by  mouth 2 (two) times a day.  Dispense: 60 tablet; Refill: 0    2. Bilateral hip pain  Assessment & Plan:  Pt given name of Chiropractor to call and set up appointment with.      3. Chronic pain of right ankle  Assessment & Plan:  Pt asking for chiropractor referral.   Declines xrays to check on hardware. Intermittent pain reported.                Follow up in about 4 months (around 12/19/2025)..  In addition to their scheduled follow up, the patient has also been instructed to follow up on as needed basis.       Future Appointments   Date Time Provider Department Center   8/14/2025  2:00 PM [facility]   9/8/2025 11:00 AM Darya Narayanan PA-C [facility]   9/15/2025  2:10 PM [facility] [place]    12/19/2025  8:00 AM Nasrin Arambula FNP [facility]   1/6/2026  1:00 PM Grisel Mcclain, ANP [facility] [place]         PUNEET Lo

## 2025-08-05 NOTE — ASSESSMENT & PLAN NOTE
Pt asking for chiropractor referral.   Declines xrays to check on hardware. Intermittent pain reported.

## 2025-08-12 ENCOUNTER — TELEPHONE (OUTPATIENT)
Dept: BEHAVIORAL HEALTH | Facility: CLINIC | Age: 40
End: 2025-08-12
Payer: MEDICAID

## 2025-08-14 ENCOUNTER — OFFICE VISIT (OUTPATIENT)
Dept: BEHAVIORAL HEALTH | Facility: CLINIC | Age: 40
End: 2025-08-14
Payer: MEDICAID

## 2025-08-14 VITALS
DIASTOLIC BLOOD PRESSURE: 86 MMHG | BODY MASS INDEX: 23.35 KG/M2 | SYSTOLIC BLOOD PRESSURE: 118 MMHG | WEIGHT: 166.81 LBS | HEIGHT: 71 IN

## 2025-08-14 DIAGNOSIS — Z79.899 MEDICAL MARIJUANA USE: ICD-10-CM

## 2025-08-14 DIAGNOSIS — F90.2 ATTENTION DEFICIT HYPERACTIVITY DISORDER (ADHD), COMBINED TYPE: ICD-10-CM

## 2025-08-14 DIAGNOSIS — F41.1 GAD (GENERALIZED ANXIETY DISORDER): Chronic | ICD-10-CM

## 2025-08-14 DIAGNOSIS — F33.1 MODERATE EPISODE OF RECURRENT MAJOR DEPRESSIVE DISORDER: Primary | Chronic | ICD-10-CM

## 2025-08-14 DIAGNOSIS — F41.0 PANIC DISORDER: ICD-10-CM

## 2025-08-14 PROCEDURE — 99213 OFFICE O/P EST LOW 20 MIN: CPT | Mod: PBBFAC,PN | Performed by: NURSE PRACTITIONER

## 2025-08-14 RX ORDER — FLUOXETINE HYDROCHLORIDE 40 MG/1
40 CAPSULE ORAL DAILY
Qty: 30 CAPSULE | Refills: 3 | Status: SHIPPED | OUTPATIENT
Start: 2025-08-14

## 2025-09-03 DIAGNOSIS — R53.83 FATIGUE, UNSPECIFIED TYPE: ICD-10-CM

## 2025-09-03 DIAGNOSIS — F90.9 ATTENTION DEFICIT HYPERACTIVITY DISORDER (ADHD), UNSPECIFIED ADHD TYPE: ICD-10-CM

## 2025-09-04 RX ORDER — DEXTROAMPHETAMINE SACCHARATE, AMPHETAMINE ASPARTATE, DEXTROAMPHETAMINE SULFATE AND AMPHETAMINE SULFATE 5; 5; 5; 5 MG/1; MG/1; MG/1; MG/1
1 TABLET ORAL 2 TIMES DAILY
Qty: 60 TABLET | Refills: 0 | Status: SHIPPED | OUTPATIENT
Start: 2025-09-04

## 2025-09-05 DIAGNOSIS — R53.83 FATIGUE, UNSPECIFIED TYPE: ICD-10-CM

## 2025-09-05 RX ORDER — LORATADINE 10 MG/1
10 TABLET ORAL DAILY
Qty: 30 TABLET | Refills: 3 | Status: SHIPPED | OUTPATIENT
Start: 2025-09-05

## 2025-09-05 RX ORDER — LORATADINE 10 MG/1
10 TABLET ORAL DAILY
Qty: 30 TABLET | Refills: 3 | OUTPATIENT
Start: 2025-09-05

## (undated) DEVICE — SUT PLN GUT 4-0 SC-1SC-1 1

## (undated) DEVICE — GLOVE SENSICARE PI GRN 6.5

## (undated) DEVICE — GLOVE SIGNATURE ESSNTL LTX 7.5

## (undated) DEVICE — SPONGE NEURO PATTIE 1/2X3IN

## (undated) DEVICE — NDL ECLIPSE SAFETY 18GX1-1/2IN

## (undated) DEVICE — GLOVE SENSICARE NEOPRENE 6.5

## (undated) DEVICE — KIT ANTIFOG W/SPONG & FLUID

## (undated) DEVICE — SOL NORMAL USPCA 0.9%

## (undated) DEVICE — DRAPE INSTR MAGNETIC 10X16IN

## (undated) DEVICE — SPLINT BREATHE-EASY NASAL REG

## (undated) DEVICE — SOL NACL IRR 1000ML BTL

## (undated) DEVICE — GLOVE SIGNATURE ESSNTL LTX 6.5

## (undated) DEVICE — CORD BIPOLAR 12 FOOT

## (undated) DEVICE — GOWN POLY REINF BRTH SLV XL

## (undated) DEVICE — Device

## (undated) DEVICE — SUT 5/0 27IN CHROMIC GUT B

## (undated) DEVICE — BLADE TRICUT ROTATABLE 4MM

## (undated) DEVICE — TUBING MEDI-VAC 20FT .25IN

## (undated) DEVICE — MANIFOLD 4 PORT

## (undated) DEVICE — TRAY CATH 1-LYR URIMTR 16FR

## (undated) DEVICE — ELECTRODE PATIENT RETURN DISP

## (undated) DEVICE — KIT SURGICAL TURNOVER

## (undated) DEVICE — SUT ETHILON 3-0 FS-1 30

## (undated) DEVICE — GLOVE SENSICARE PI GRN 7

## (undated) DEVICE — PENCIL ELECSURG ROCKER 15FT

## (undated) DEVICE — BLADE TRICUT 2.9MM 5/PK

## (undated) DEVICE — NDL 27G X 1 1/4

## (undated) DEVICE — SYR 10CC LUER LOCK